# Patient Record
Sex: MALE | Race: BLACK OR AFRICAN AMERICAN | NOT HISPANIC OR LATINO | Employment: OTHER | URBAN - METROPOLITAN AREA
[De-identification: names, ages, dates, MRNs, and addresses within clinical notes are randomized per-mention and may not be internally consistent; named-entity substitution may affect disease eponyms.]

---

## 2017-02-02 ENCOUNTER — OFFICE VISIT (OUTPATIENT)
Dept: PSYCHIATRY | Facility: CLINIC | Age: 57
End: 2017-02-02
Payer: MEDICARE

## 2017-02-02 VITALS
WEIGHT: 207 LBS | DIASTOLIC BLOOD PRESSURE: 89 MMHG | HEART RATE: 87 BPM | BODY MASS INDEX: 30.66 KG/M2 | SYSTOLIC BLOOD PRESSURE: 138 MMHG | HEIGHT: 69 IN

## 2017-02-02 DIAGNOSIS — F32.A DEPRESSIVE DISORDER: Primary | ICD-10-CM

## 2017-02-02 PROCEDURE — 3079F DIAST BP 80-89 MM HG: CPT | Mod: S$GLB,,, | Performed by: PSYCHIATRY & NEUROLOGY

## 2017-02-02 PROCEDURE — 3075F SYST BP GE 130 - 139MM HG: CPT | Mod: S$GLB,,, | Performed by: PSYCHIATRY & NEUROLOGY

## 2017-02-02 PROCEDURE — 99214 OFFICE O/P EST MOD 30 MIN: CPT | Mod: S$GLB,,, | Performed by: PSYCHIATRY & NEUROLOGY

## 2017-02-02 PROCEDURE — 99999 PR PBB SHADOW E&M-EST. PATIENT-LVL II: CPT | Mod: PBBFAC,,, | Performed by: PSYCHIATRY & NEUROLOGY

## 2017-02-02 RX ORDER — SERTRALINE HYDROCHLORIDE 100 MG/1
100 TABLET, FILM COATED ORAL DAILY
Qty: 30 TABLET | Refills: 3 | Status: SHIPPED | OUTPATIENT
Start: 2017-02-02 | End: 2017-03-02

## 2017-02-02 RX ORDER — RISPERIDONE 1 MG/1
1 TABLET ORAL NIGHTLY
Qty: 30 TABLET | Refills: 3 | Status: SHIPPED | OUTPATIENT
Start: 2017-02-02 | End: 2017-02-08 | Stop reason: SDUPTHER

## 2017-02-02 RX ORDER — LAMOTRIGINE 200 MG/1
TABLET ORAL
Qty: 30 TABLET | Refills: 3 | Status: SHIPPED | OUTPATIENT
Start: 2017-02-02 | End: 2017-03-08 | Stop reason: SDUPTHER

## 2017-02-02 NOTE — MR AVS SNAPSHOT
[facility]  [address]  [facility] [address]  Phone: [phone]  Fax: [phone]                  Roni Espinoza   2017 1:00 PM   Office Visit    Description:  Male : 1960   Provider:  Vinnie Cotter MD   Department:  [facility]           Diagnoses this Visit        Comments    Depressive disorder    -  Primary            To Do List           Future Appointments        Provider Department Dept Phone    3/27/2017 8:30 AM INJECTION, INFECTIOUS DISEASES [facility] [phone]      Goals (5 Years of Data)     None       These Medications        Disp Refills Start End    folic acid-vit B6-vit B12 2.5-25-2 mg (FOLBIC) 2.5-25-2 mg Tab 30 tablet 3 2017     Take 1 tablet by mouth once daily. - Oral    Pharmacy: [address] Ph #: [phone]       lamotrigine (LAMICTAL) 200 MG tablet 30 tablet 3 2017     TAKE 1 TABLET ONE TIME DAILY    Pharmacy: [address] Ph #: [phone]       sertraline (ZOLOFT) 100 MG tablet 30 tablet 3 2017 3/4/2017    Take 1 tablet (100 mg total) by mouth once daily. - Oral    Pharmacy: [address] Ph #: [phone]       risperidone (RISPERDAL) 1 MG tablet 30 tablet 3 2017    Take 1 tablet (1 mg total) by mouth every evening. - Oral    Pharmacy: [address] Ph #: [phone]         [facility]     [facility] On Call Nurse Care Line -  Assistance  Registered nurses in the [facility] Center provide clinical advisement, health education, appointment booking, and other advisory services.  Call for this free service at [phone].             Medications           Message regarding Medications     Verify the changes and/or additions to  "your medication regime listed below are the same as discussed with your clinician today.  If any of these changes or additions are incorrect, please notify your healthcare provider.        START taking these NEW medications        Refills    risperidone (RISPERDAL) 1 MG tablet 3    Sig: Take 1 tablet (1 mg total) by mouth every evening.    Class: Normal    Route: Oral      STOP taking these medications     aripiprazole (ABILIFY) 5 MG Tab Take 1 tablet (5 mg total) by mouth every morning.           Verify that the below list of medications is an accurate representation of the medications you are currently taking.  If none reported, the list may be blank. If incorrect, please contact your healthcare provider. Carry this list with you in case of emergency.           Current Medications     ferrous sulfate 325 mg (65 mg iron) Tab tablet Take 1 tablet (325 mg total) by mouth every 12 (twelve) hours.    folic acid-vit B6-vit B12 2.5-25-2 mg (FOLBIC) 2.5-25-2 mg Tab Take 1 tablet by mouth once daily.    hydrochlorothiazide (HYDRODIURIL) 25 MG tablet Take 1 tablet (25 mg total) by mouth once daily.    lamotrigine (LAMICTAL) 200 MG tablet TAKE 1 TABLET ONE TIME DAILY    pantoprazole (PROTONIX) 40 MG tablet Take 1 tablet (40 mg total) by mouth before breakfast.    potassium chloride SA (K-DUR,KLOR-CON) 10 MEQ tablet Take 2 tablets (20 mEq total) by mouth once daily.    risperidone (RISPERDAL) 1 MG tablet Take 1 tablet (1 mg total) by mouth every evening.    sertraline (ZOLOFT) 100 MG tablet     sertraline (ZOLOFT) 100 MG tablet Take 1 tablet (100 mg total) by mouth once daily.    tadalafil (CIALIS) 20 MG Tab Take 1 tablet (20 mg total) by mouth once daily.           Clinical Reference Information           Your Vitals Were     BP Pulse Height Weight BMI    138/89 87 5' 9" (1.753 m) 93.9 kg (207 lb) 30.57 kg/m2      Blood Pressure          Most Recent Value    BP  138/89      Allergies as of 2/2/2017     No Known Allergies    "   Immunizations Administered on Date of Encounter - 2/2/2017     None      Language Assistance Services     ATTENTION: Language assistance services are available, free of charge. Please call [phone].      ATENCIÓN: Si habla gertrude, tiene a laguerre disposición servicios gratuitos de asistencia lingüística. Llame al [phone].     CHÚ Ý: N?u b?n nói Ti?ng Vi?t, có các d?ch v? h? tr? ngôn ng? mi?n phí dành cho b?n. G?i s? [phone].         [facility] complies with applicable Federal civil rights laws and does not discriminate on the basis of race, color, national origin, age, disability, or sex.

## 2017-02-02 NOTE — PROGRESS NOTES
ESTABLISHED OUTPATIENT VISIT   E/M LEVEL 4: [id number]    ENCOUNTER DATE: 2/2/2017  SITE: [facility] [address]    HISTORY    CHIEF COMPLAINT   Roni Espinoza is a 56 y.o. male who presents for follow up of Cognitive d/o N.O.S., Mood d/o N.O.S., Psychosis N.O.S.  .    HPI     Reports hearing the voice of his shadow telling him demeaning things. Pt. Upset during today's visit, tearful, says that he is confused about what is happening with him.    Sexual functioning has not improved significantly since discontinuation of Risperidone.    Psychiatric Review Of Systems - Is patient experiencing or having changes in:  sleep: poor recently  appetite: no  weight: no  energy/anergy: no  interest/pleasure/anhedonia: no  somatic symptoms: no  libido: no  anxiety/panic: no  guilty/hopelessness: no  concentration: no  S.I.B.s/risky behavior: no  Irritability: no  Racing thoughts: no  Impulsive behaviors: no  Paranoia:no  AVH:no    Recent alcohol: no  Recent drug: no    Medical ROS   Denies any current physical complaint     PAST MEDICAL, FAMILY AND SOCIAL HISTORY: The patient's past medical, family and social history have been reviewed and updated as appropriate within the electronic medical record - see encounter notes.    PSYCHOTROPIC MEDICATIONS   Lamictal 200 mg qam, Zoloft 100 mg qam, Abilify 5 mg qam, Folbic at bedtime    EXAMINATION    Vitals:    02/02/17 1157   BP: 138/89   Pulse: 87     Weight: 207 lb's    CONSTITUTIONAL  General Appearance: well nourished    MUSCULOSKELETAL  Muscle Strength and Tone: normal strength and tone  Abnormal Involuntary Movements: no abnormal movement noted  Gait and Station: normal gait    PSYCHIATRIC   Level of Consciousness: alert  Orientation: says, that today is the 4th of November 2016  Grooming: well groomed  Psychomotor Behavior: no restlessness/agitation  Speech: normal in rate, rhythm and volume  Language: normal vocabulary  Mood: depressed at times  Affect:  tearful  Thought Process: logical and goal directed  Associations: intact associations  Thought Content: no SI/HI  Memory: grossly intact  Attention: intact to content of interview  Fund of Knowledge: appears adequate  Insight: good  Judgement: good    MEDICAL DECISION MAKING    DIAGNOSES  Cognitive d/o N.O.S., Mood d/o N.O.S., Psychosis N.O.S.    PROBLEM LIST AND MANAGEMENT PLANS    - mood d/o, psychosis: discontinue Abilify, re-start Risperidone 1 mg at bedtime, continue Zoloft, Lamictal and Folbic as above.  - rtc 2-3 months     Time with patient: 20 min    LABORATORY DATA  Lab Visit on 12/06/2016   Component Date Value Ref Range Status    Hemoglobin 12/06/2016 13.3* 14.0 - 18.0 g/dL Final    Ferritin 12/06/2016 71  20.0 - 300.0 ng/mL Final           Vinnie Cotter

## 2017-02-09 RX ORDER — RISPERIDONE 1 MG/1
TABLET ORAL
Qty: 90 TABLET | Refills: 0 | Status: SHIPPED | OUTPATIENT
Start: 2017-02-09 | End: 2017-03-08 | Stop reason: SDUPTHER

## 2017-02-13 ENCOUNTER — NURSE TRIAGE (OUTPATIENT)
Dept: ADMINISTRATIVE | Facility: CLINIC | Age: 57
End: 2017-02-13

## 2017-02-13 NOTE — TELEPHONE ENCOUNTER
Reason for Disposition   Intermittent chest pains persist > 3 days    Protocols used: ST CHEST PAIN-A-OH    Wife complains of ongoing chest and arm pain off and on for weeks.  No pain now.  Attempt to schedule appointment today, Wife stated she just had a procedure and cannot drive or get anyone to bring him.  She wants an appointment tomorrow.  Appointment scheduled with  tomorrow.  Advised Wife if he starts to have pain again call 911 and bring to ED.

## 2017-03-01 ENCOUNTER — NURSE TRIAGE (OUTPATIENT)
Dept: ADMINISTRATIVE | Facility: CLINIC | Age: 57
End: 2017-03-01

## 2017-03-01 NOTE — TELEPHONE ENCOUNTER
"    Reason for Disposition   Taking a medicine that could cause dizziness (e.g., blood pressure medications, diuretics)    Additional Information   Negative: Chest pain     Chest pain intermittently, arm numbness a couple of weeks ago, but not now.   Commented on: Heart beating < 50 beats per minute OR > 140 beats per minute     Unknown (wife, caller, is not with him at this call)    Answer Assessment - Initial Assessment Questions  1. DESCRIPTION: "Describe your dizziness."      He is losing his balance from dizziness.  He has fallen twice that wife knows of, once last week, and one time a month ago.    2. LIGHTHEADED: "Do you feel lightheaded?" (e.g., somewhat faint, woozy, weak upon standing)      Yes, and when he stands up his back hurts him, too.    3. VERTIGO: "Do you feel like either you or the room is spinning or tilting?" (i.e. vertigo)      I think he says it is spinning.    4. SEVERITY: "How bad is it?"  "Do you feel like you are going to faint?" "Can you stand and walk?"    - MILD - walking normally    - MODERATE - interferes with normal activities (e.g., work, school)     - SEVERE - unable to stand, requires support to walk, feels like passing out now.       He has fallen, and he feels faint often when he is up and walking.    5. ONSET:  "When did the dizziness begin?"      It began originally last year, blood pressure medication was changed (wife states it was decreased, and it was better for a while, but it is now a problem again).    6. AGGRAVATING FACTORS: "Does anything make it worse?" (e.g., standing, change in head position)      When he first stands up it is often worse.  The last time he fell, however, he had gotten out of the shower and was walking, and he just fell to the floor.  He did not lose consciousness at the fall; his leg just gave out.      7. HEART RATE: "Can you tell me your heart rate?" "How many beats in 15 seconds?"  (Note: not all patients can do this)        I don't " "know.    8. CAUSE: "What do you think is causing the dizziness?"      I don't know (wife), because it seems very random.    9. RECURRENT SYMPTOM: "Have you had dizziness before?" If so, ask: "When was the last time?" "What happened that time?"      Yes, last year, and he did lose consciousness at that time.  He saw Shelton Cason then, and that is when the BP medication was changed. (note: they do not have a cuff at home to take pressure).    10. OTHER SYMPTOMS: "Do you have any other symptoms?" (e.g., fever, chest pain, vomiting, diarrhea, bleeding)        He complained two weeks ago of his arm getting numb, and chest pain, but he would not go to the doctor then. He is sweating more now, too.    11. PREGNANCY: "Is there any chance you are pregnant?" "When was your last menstrual period?"        n/a    Protocols used:  DIZZINESS - [id number]    Roni 's wife, Jennifer, called to say he has been having dizzy spells, with some gait changes.  Has fallen twice in the last month, now has back pain as a result, but did not lose consciousness either time (just lost his balance, per Jennifer, as she witnessed those incidents).  Jennifer states it is difficult to get information from him about how he is feeling. She said the last time this happened, over a year ago, it was felt his BP medications were involved in the symptoms, and they were changed.  Jennifer will bring in all medications with her to appt with Dr Handy tomorrow.  Shelton Cason MD is not available at all this week to be seen.  Message to Dr Cason, and to Dr Handy.  Please contact caller directly with any additional care advice.    "

## 2017-03-02 ENCOUNTER — LAB VISIT (OUTPATIENT)
Dept: LAB | Facility: HOSPITAL | Age: 57
End: 2017-03-02
Attending: INTERNAL MEDICINE
Payer: MEDICARE

## 2017-03-02 ENCOUNTER — OFFICE VISIT (OUTPATIENT)
Dept: INTERNAL MEDICINE | Facility: CLINIC | Age: 57
End: 2017-03-02
Payer: MEDICARE

## 2017-03-02 VITALS
HEIGHT: 69 IN | TEMPERATURE: 98 F | BODY MASS INDEX: 30.36 KG/M2 | WEIGHT: 205 LBS | SYSTOLIC BLOOD PRESSURE: 126 MMHG | DIASTOLIC BLOOD PRESSURE: 72 MMHG

## 2017-03-02 DIAGNOSIS — R07.89 ATYPICAL CHEST PAIN: ICD-10-CM

## 2017-03-02 DIAGNOSIS — R40.4 TRANSIENT ALTERATION OF AWARENESS: ICD-10-CM

## 2017-03-02 DIAGNOSIS — E53.8 B12 DEFICIENCY: ICD-10-CM

## 2017-03-02 DIAGNOSIS — I10 ESSENTIAL HYPERTENSION: ICD-10-CM

## 2017-03-02 DIAGNOSIS — G40.309 GENERALIZED CONVULSIVE EPILEPSY WITHOUT INTRACTABLE EPILEPSY: ICD-10-CM

## 2017-03-02 DIAGNOSIS — E55.9 VITAMIN D DEFICIENCY DISEASE: ICD-10-CM

## 2017-03-02 DIAGNOSIS — Q27.30 AVM (ARTERIOVENOUS MALFORMATION): ICD-10-CM

## 2017-03-02 DIAGNOSIS — E61.1 IRON DEFICIENCY: ICD-10-CM

## 2017-03-02 DIAGNOSIS — R55 SYNCOPE, UNSPECIFIED SYNCOPE TYPE: Primary | ICD-10-CM

## 2017-03-02 DIAGNOSIS — F33.3 MAJOR DEPRESSIVE DISORDER, RECURRENT EPISODE, SEVERE, SPECIFIED AS WITH PSYCHOTIC BEHAVIOR: ICD-10-CM

## 2017-03-02 LAB
25(OH)D3+25(OH)D2 SERPL-MCNC: 39 NG/ML
ALBUMIN SERPL BCP-MCNC: 3.9 G/DL
ALP SERPL-CCNC: 85 U/L
ALT SERPL W/O P-5'-P-CCNC: 19 U/L
ANION GAP SERPL CALC-SCNC: 10 MMOL/L
AST SERPL-CCNC: 27 U/L
BASOPHILS # BLD AUTO: 0.02 K/UL
BASOPHILS NFR BLD: 0.3 %
BILIRUB SERPL-MCNC: 0.5 MG/DL
BUN SERPL-MCNC: 10 MG/DL
CALCIUM SERPL-MCNC: 9.4 MG/DL
CHLORIDE SERPL-SCNC: 102 MMOL/L
CO2 SERPL-SCNC: 30 MMOL/L
CREAT SERPL-MCNC: 1.2 MG/DL
DIFFERENTIAL METHOD: ABNORMAL
EOSINOPHIL # BLD AUTO: 0.2 K/UL
EOSINOPHIL NFR BLD: 2.5 %
ERYTHROCYTE [DISTWIDTH] IN BLOOD BY AUTOMATED COUNT: 13.8 %
EST. GFR  (AFRICAN AMERICAN): >60 ML/MIN/1.73 M^2
EST. GFR  (NON AFRICAN AMERICAN): >60 ML/MIN/1.73 M^2
FERRITIN SERPL-MCNC: 69 NG/ML
GLUCOSE SERPL-MCNC: 82 MG/DL
HCT VFR BLD AUTO: 40.6 %
HGB BLD-MCNC: 13.4 G/DL
LYMPHOCYTES # BLD AUTO: 1.4 K/UL
LYMPHOCYTES NFR BLD: 22.2 %
MAGNESIUM SERPL-MCNC: 2.3 MG/DL
MCH RBC QN AUTO: 30.6 PG
MCHC RBC AUTO-ENTMCNC: 33 %
MCV RBC AUTO: 93 FL
MONOCYTES # BLD AUTO: 0.7 K/UL
MONOCYTES NFR BLD: 10 %
NEUTROPHILS # BLD AUTO: 4.2 K/UL
NEUTROPHILS NFR BLD: 64.8 %
PLATELET # BLD AUTO: 184 K/UL
PMV BLD AUTO: 10.7 FL
POTASSIUM SERPL-SCNC: 3.5 MMOL/L
PROT SERPL-MCNC: 7.5 G/DL
RBC # BLD AUTO: 4.38 M/UL
SODIUM SERPL-SCNC: 142 MMOL/L
TSH SERPL DL<=0.005 MIU/L-ACNC: 0.55 UIU/ML
VIT B12 SERPL-MCNC: >2000 PG/ML
WBC # BLD AUTO: 6.49 K/UL

## 2017-03-02 PROCEDURE — 1160F RVW MEDS BY RX/DR IN RCRD: CPT | Mod: S$GLB,,, | Performed by: INTERNAL MEDICINE

## 2017-03-02 PROCEDURE — 3074F SYST BP LT 130 MM HG: CPT | Mod: S$GLB,,, | Performed by: INTERNAL MEDICINE

## 2017-03-02 PROCEDURE — 82607 VITAMIN B-12: CPT

## 2017-03-02 PROCEDURE — 85025 COMPLETE CBC W/AUTO DIFF WBC: CPT

## 2017-03-02 PROCEDURE — 93005 ELECTROCARDIOGRAM TRACING: CPT | Mod: S$GLB,,, | Performed by: INTERNAL MEDICINE

## 2017-03-02 PROCEDURE — 99215 OFFICE O/P EST HI 40 MIN: CPT | Mod: S$GLB,,, | Performed by: INTERNAL MEDICINE

## 2017-03-02 PROCEDURE — 80053 COMPREHEN METABOLIC PANEL: CPT

## 2017-03-02 PROCEDURE — 82728 ASSAY OF FERRITIN: CPT

## 2017-03-02 PROCEDURE — 84443 ASSAY THYROID STIM HORMONE: CPT

## 2017-03-02 PROCEDURE — 93010 ELECTROCARDIOGRAM REPORT: CPT | Mod: S$GLB,,, | Performed by: INTERNAL MEDICINE

## 2017-03-02 PROCEDURE — 80175 DRUG SCREEN QUAN LAMOTRIGINE: CPT

## 2017-03-02 PROCEDURE — 83735 ASSAY OF MAGNESIUM: CPT

## 2017-03-02 PROCEDURE — 3078F DIAST BP <80 MM HG: CPT | Mod: S$GLB,,, | Performed by: INTERNAL MEDICINE

## 2017-03-02 PROCEDURE — 36415 COLL VENOUS BLD VENIPUNCTURE: CPT

## 2017-03-02 PROCEDURE — 99999 PR PBB SHADOW E&M-EST. PATIENT-LVL III: CPT | Mod: PBBFAC,,, | Performed by: INTERNAL MEDICINE

## 2017-03-02 PROCEDURE — 82306 VITAMIN D 25 HYDROXY: CPT

## 2017-03-02 RX ORDER — HYDROCHLOROTHIAZIDE 25 MG/1
12.5 TABLET ORAL DAILY
Qty: 90 TABLET | Refills: 3
Start: 2017-03-02 | End: 2017-11-07 | Stop reason: SDUPTHER

## 2017-03-02 NOTE — Clinical Note
Hi all: I saw him with his wife.  She is very worried about his memory and mental status along with some physical sx.  EKG OK- ordered labs, carotid and stress test. Shelton- he needs to see you for follow up. Dr Cotter- do you think he may need Neurology vs Neuropsych testing?

## 2017-03-02 NOTE — MR AVS SNAPSHOT
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    -  Primary     Major depressive disorder, recurrent episode, severe, specified as with psychotic behavior         Essential hypertension         AVM (arteriovenous malformation)         Transient alteration of awareness         Iron deficiency         B12 deficiency         Vitamin D deficiency disease         Syncope, unspecified syncope type         Atypical chest pain                To Do List           Future Appointments        Provider Department Dept Phone    3/27/2017 8:30 AM INJECTION, INFECTIOUS DISEASES [facility] [phone]      Goals (5 Years of Data)     None       These Medications        Disp Refills Start End    hydrochlorothiazide (HYDRODIURIL) 25 MG tablet 90 tablet 3 3/2/2017 3/2/2018    Take 0.5 tablets (12.5 mg total) by mouth once daily. - Oral    Pharmacy: [facility] - [address] #: [phone]         [facility] On Call     [facility] On Call Nurse Care Line -  Assistance  Registered nurses in the [facility] On Call Center provide clinical advisement, health education, appointment booking, and other advisory services.  Call for this free service at [phone].             Medications           Message regarding Medications     Verify the changes and/or additions to your medication regime listed below are the same as discussed with your clinician today.  If any of these changes or additions are incorrect, please notify your healthcare provider.        CHANGE how you  are taking these medications     Start Taking Instead of    hydrochlorothiazide (HYDRODIURIL) 25 MG tablet hydrochlorothiazide (HYDRODIURIL) 25 MG tablet    Dosage:  Take 0.5 tablets (12.5 mg total) by mouth once daily. Dosage:  Take 1 tablet (25 mg total) by mouth once daily.    Reason for Change:  Reorder            Verify that the below list of medications is an accurate representation of the medications you are currently taking.  If none reported, the list may be blank. If incorrect, please contact your healthcare provider. Carry this list with you in case of emergency.           Current Medications     ferrous sulfate 325 mg (65 mg iron) Tab tablet Take 1 tablet (325 mg total) by mouth every 12 (twelve) hours.    folic acid-vit B6-vit B12 2.5-25-2 mg (FOLBIC) 2.5-25-2 mg Tab Take 1 tablet by mouth once daily.    hydrochlorothiazide (HYDRODIURIL) 25 MG tablet Take 0.5 tablets (12.5 mg total) by mouth once daily.    pantoprazole (PROTONIX) 40 MG tablet Take 1 tablet (40 mg total) by mouth before breakfast.    risperidone (RISPERDAL) 1 MG tablet TAKE 1 TABLET EVERY EVENING    sertraline (ZOLOFT) 100 MG tablet     tadalafil (CIALIS) 20 MG Tab Take 1 tablet (20 mg total) by mouth once daily.    lamotrigine (LAMICTAL) 200 MG tablet TAKE 1 TABLET ONE TIME DAILY    potassium chloride SA (K-DUR,KLOR-CON) 10 MEQ tablet Take 2 tablets (20 mEq total) by mouth once daily.           Clinical Reference Information           Your Vitals Were     BP                   126/72 (BP Location: Right arm, Patient Position: Sitting, BP Method: Manual)           Blood Pressure          Most Recent Value    BP  126/72      Allergies as of 3/2/2017     No Known Allergies      Immunizations Administered on Date of Encounter - 3/2/2017     None      Orders Placed During Today's Visit      Normal Orders This Visit    EKG 12-lead     Future Labs/Procedures Expected by Expires    CBC auto differential  3/2/2017 3/2/2018    Comprehensive  metabolic panel  3/2/2017 3/2/2018    Ferritin  3/2/2017 5/31/2017    LAMOTRIGINE LEVEL  3/2/2017 5/1/2018    Magnesium  3/2/2017 3/2/2018    TSH  3/2/2017 3/2/2018    Vitamin B12  3/2/2017 3/2/2018    Vitamin D  3/2/2017 (Approximate) 3/2/2018    CAR Ultrasound doppler carotid bliateral  As directed 3/2/2018    Exercise stress echo  As directed 3/2/2018      Language Assistance Services     ATTENTION: Language assistance services are available, free of charge. Please call [phone].      ATENCIÓN: Si habla español, tiene a laguerre disposición servicios gratuitos de asistencia lingüística. Llame al [phone].     CHÚ Ý: N?u b?n nói Ti?ng Vi?t, có các d?ch v? h? tr? ngôn ng? mi?n phí dành cho b?n. G?i s? [phone].         [facility] complies with applicable Federal civil rights laws and does not discriminate on the basis of race, color, national origin, age, disability, or sex.

## 2017-03-02 NOTE — PROGRESS NOTES
"Subjective:       Patient ID: Roni Espinoza is a 56 y.o. male.    Chief Complaint: Dizziness    HPI Comments:  appt Dr Cason    Here with wife.    Multiple issues.  Most of history from wife.  He has some psychiatric diagnoses.  Wife is concerned about some memory issues as well, no formal dementia dx.   Last Psych note says "mood disorder and psychosis."  No new meds.  No ETOH or drugs.    First time it happened (over 6 months ago) he had been sitting at the kitchen table, then stood up, and felt dizzy and fell.  He was seen after this and a nurse practitioner saw him and adjusted his BP meds.    In the past 3-4 weeks, his wife says he has had multiple episodes.  He becomes sweaty.  Sometimes this occurs when he is sitting, and sometimes it occurs when he is active.    In particular yesterday he was sitting when sx started.  Denies emotional sx.      He also says sometimes he gets chest pain, usually starts in the right arm.  This makes him feel nervous. Sx are usually at night.  It wakes him up from his sleep.    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    Review of Systems   Constitutional: Positive for fatigue.   HENT: Negative for congestion, postnasal drip, rhinorrhea and sinus pressure.    Eyes: Negative for redness and visual disturbance.   Respiratory: Negative for apnea, choking, shortness of breath and stridor.    Cardiovascular: Positive for chest pain. Negative for palpitations and leg swelling.        Atypical chest pain,  Not exertional.  Has wakened him from sleep    Not sure about GERD   Gastrointestinal: Negative for abdominal " pain, constipation, diarrhea and nausea.   Genitourinary: Negative for difficulty urinating, flank pain, frequency, testicular pain and urgency.   Musculoskeletal: Negative for arthralgias, back pain and myalgias.   Skin: Negative for color change and rash.   Neurological: Positive for dizziness.        Pre-syncope  Memory issues- vague per wife   Psychiatric/Behavioral: Positive for confusion and dysphoric mood. Negative for hallucinations, self-injury and sleep disturbance. The patient is not nervous/anxious.        Objective:      Physical Exam   Constitutional: He is oriented to person, place, and time. He appears well-developed and well-nourished.   HENT:   Head: Normocephalic and atraumatic.   Right Ear: External ear normal.   Left Ear: External ear normal.   Eyes: Conjunctivae and EOM are normal.   Neck: Normal range of motion. Neck supple. No thyromegaly present.   Cardiovascular: Normal rate and regular rhythm.    No murmur heard.  Pulmonary/Chest: Effort normal and breath sounds normal. No respiratory distress. He has no wheezes.   Abdominal: Soft. He exhibits no distension. There is no tenderness.   Musculoskeletal: He exhibits no edema or tenderness.   Lymphadenopathy:     He has no cervical adenopathy.   Neurological: He is alert and oriented to person, place, and time. No cranial nerve deficit.   Skin: Skin is warm and dry.   Psychiatric: He has a normal mood and affect. His behavior is normal.       Assessment:       1. Syncope, unspecified syncope type    2. Atypical chest pain    3. 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 Major depressive disorder, recurrent episode, severe, specified as with psychotic behavior    5. Essential hypertension    6. AVM (arteriovenous malformation)    7. Transient alteration of awareness    8. Iron deficiency    9. B12 deficiency    10. Vitamin D deficiency disease        Plan:         Syncope, unspecified syncope type: cautions reviewed,  postural measures.    Reduce diuretic to 1/2 daily.    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  Call if BP > 135/85 on a regular basis.  -     CBC auto differential; Future; Expected date: 3/2/17  -     Comprehensive metabolic panel; Future; Expected date: 3/2/17  -     hydrochlorothiazide (HYDRODIURIL) 25 MG tablet; Take 0.5 tablets (12.5 mg total) by mouth once daily.  Dispense: 90 tablet; Refill: 3    AVM (arteriovenous malformation): stable per Neurosurgery    Transient alteration of awareness: may need to consider Neurology assessment and possible Neuropsych testing?  -     Magnesium; Future; Expected date: 3/2/17  -     TSH; Future; Expected date: 3/2/17    Iron deficiency  -     Ferritin; Future; Expected date: 3/2/17    B12 deficiency  -     Vitamin B12; Future; Expected date: 3/2/17    Vitamin D deficiency disease  -     Vitamin D; Future; Expected date: 3/2/17    Patient evaluated for over 45 minutes with this appoinment, including diagnostic testing and treatment.  All questions answered,  chart reviewed and care coordinated.

## 2017-03-03 ENCOUNTER — CLINICAL SUPPORT (OUTPATIENT)
Dept: CARDIOLOGY | Facility: CLINIC | Age: 57
End: 2017-03-03
Payer: MEDICARE

## 2017-03-03 ENCOUNTER — HOSPITAL ENCOUNTER (OUTPATIENT)
Dept: CARDIOLOGY | Facility: CLINIC | Age: 57
Discharge: HOME | End: 2017-03-03
Payer: MEDICARE

## 2017-03-03 DIAGNOSIS — R07.89 ATYPICAL CHEST PAIN: ICD-10-CM

## 2017-03-03 DIAGNOSIS — R55 SYNCOPE, UNSPECIFIED SYNCOPE TYPE: ICD-10-CM

## 2017-03-03 LAB
DIASTOLIC DYSFUNCTION: NO
INTERNAL CAROTID STENOSIS: NORMAL
RETIRED EF AND QEF - SEE NOTES: 60 (ref 55–65)

## 2017-03-03 PROCEDURE — 93351 STRESS TTE COMPLETE: CPT | Mod: S$GLB,,, | Performed by: INTERNAL MEDICINE

## 2017-03-03 PROCEDURE — 93321 DOPPLER ECHO F-UP/LMTD STD: CPT | Mod: S$GLB,,, | Performed by: INTERNAL MEDICINE

## 2017-03-03 PROCEDURE — 93880 EXTRACRANIAL BILAT STUDY: CPT | Mod: S$GLB,,, | Performed by: INTERNAL MEDICINE

## 2017-03-04 ENCOUNTER — PATIENT MESSAGE (OUTPATIENT)
Dept: INTERNAL MEDICINE | Facility: CLINIC | Age: 57
End: 2017-03-04

## 2017-03-06 ENCOUNTER — TELEPHONE (OUTPATIENT)
Dept: INTERNAL MEDICINE | Facility: CLINIC | Age: 57
End: 2017-03-06

## 2017-03-06 ENCOUNTER — PATIENT MESSAGE (OUTPATIENT)
Dept: INTERNAL MEDICINE | Facility: CLINIC | Age: 57
End: 2017-03-06

## 2017-03-06 DIAGNOSIS — R41.82 ALTERED MENTAL STATUS, UNSPECIFIED ALTERED MENTAL STATUS TYPE: Primary | ICD-10-CM

## 2017-03-06 LAB — LAMOTRIGINE SERPL-MCNC: 2.3 UG/ML (ref 2–15)

## 2017-03-06 NOTE — TELEPHONE ENCOUNTER
----- Message from Vinnie Cotter MD sent at 3/6/2017  8:03 AM CST -----  Thank you so much! Yes, neurology f/u would be good. He has not seen a neurologist in our system since 07/02/14. I will see him on Wednesday.  Vinnie Cotter  ----- Message -----     From: Ginger Handy MD     Sent: 3/4/2017  10:14 AM       To: Vinnie Cotter MD    Hi all:  I saw him with his wife.  She is very worried about his memory and mental status along with some physical sx.  EKG OK- ordered labs, carotid and stress test.  Shelton- he needs to see you for follow up.  Dr Cotter- do you think he may need Neurology vs Neuropsych testing?

## 2017-03-06 NOTE — TELEPHONE ENCOUNTER
Please let him or his wife know that I heard from Dr. Cotter and he is recommending a Neurology assessment.    I have placed a referral for this thanks

## 2017-03-06 NOTE — TELEPHONE ENCOUNTER
Pt seen by Dr Handy, wife is concerned about his memory. Pt is overdue for appointment. Please schedule for a follow up visit sometime in next 3-4 weeks, okay to overbook.

## 2017-03-08 ENCOUNTER — OFFICE VISIT (OUTPATIENT)
Dept: PSYCHIATRY | Facility: CLINIC | Age: 57
End: 2017-03-08
Payer: MEDICARE

## 2017-03-08 VITALS
HEART RATE: 69 BPM | WEIGHT: 208 LBS | DIASTOLIC BLOOD PRESSURE: 81 MMHG | HEIGHT: 69 IN | SYSTOLIC BLOOD PRESSURE: 124 MMHG | BODY MASS INDEX: 30.81 KG/M2

## 2017-03-08 DIAGNOSIS — F39 MOOD DISORDER: Primary | ICD-10-CM

## 2017-03-08 PROCEDURE — 3074F SYST BP LT 130 MM HG: CPT | Mod: S$GLB,,, | Performed by: PSYCHIATRY & NEUROLOGY

## 2017-03-08 PROCEDURE — 3079F DIAST BP 80-89 MM HG: CPT | Mod: S$GLB,,, | Performed by: PSYCHIATRY & NEUROLOGY

## 2017-03-08 PROCEDURE — 99999 PR PBB SHADOW E&M-EST. PATIENT-LVL II: CPT | Mod: PBBFAC,,, | Performed by: PSYCHIATRY & NEUROLOGY

## 2017-03-08 PROCEDURE — 1160F RVW MEDS BY RX/DR IN RCRD: CPT | Mod: S$GLB,,, | Performed by: PSYCHIATRY & NEUROLOGY

## 2017-03-08 PROCEDURE — 99214 OFFICE O/P EST MOD 30 MIN: CPT | Mod: S$GLB,,, | Performed by: PSYCHIATRY & NEUROLOGY

## 2017-03-08 RX ORDER — RISPERIDONE 1 MG/1
1 TABLET ORAL NIGHTLY
Qty: 90 TABLET | Refills: 0 | Status: SHIPPED | OUTPATIENT
Start: 2017-03-08 | End: 2017-05-03 | Stop reason: SDUPTHER

## 2017-03-08 RX ORDER — LAMOTRIGINE 200 MG/1
TABLET ORAL
Qty: 30 TABLET | Refills: 3 | Status: SHIPPED | OUTPATIENT
Start: 2017-03-08 | End: 2017-05-03 | Stop reason: SDUPTHER

## 2017-03-08 RX ORDER — SERTRALINE HYDROCHLORIDE 100 MG/1
100 TABLET, FILM COATED ORAL DAILY
Qty: 30 TABLET | Refills: 3 | Status: SHIPPED | OUTPATIENT
Start: 2017-03-08 | End: 2017-04-07

## 2017-03-08 NOTE — MR AVS SNAPSHOT
[facility] - Psychiatry  [address] [facility] [address]  Phone: [phone]  Fax: [phone]                  Roni Espinoza   3/8/2017 3:30 PM   Office Visit    Description:  Male : 1960   Provider:  Vinnie Cotter MD   Department:  [facility] - Psychiatry           Diagnoses this Visit        Comments    Mood disorder    -  Primary            To Do List           Future Appointments        Provider Department Dept Phone    3/21/2017 11:20 AM Shelton Cason MD [facility] - Internal Medicine [phone]    3/27/2017 8:30 AM INJECTION, INFECTIOUS DISEASES [facility]- ID Injection Room [phone]      Goals (5 Years of Data)     None       These Medications        Disp Refills Start End    lamotrigine (LAMICTAL) 200 MG tablet 30 tablet 3 3/8/2017     TAKE 1 TABLET ONE TIME DAILY    Pharmacy: [address] Ph #: [phone]       risperidone (RISPERDAL) 1 MG tablet 90 tablet 0 3/8/2017     Take 1 tablet (1 mg total) by mouth every evening. - Oral    Pharmacy: [address] - [address] Ph #: [phone]       sertraline (ZOLOFT) 100 MG tablet 30 tablet 3 3/8/2017 2017    Take 1 tablet (100 mg total) by mouth once daily. - Oral    Pharmacy: [address] Ph #: [phone]       folic acid-vit B6-vit B12 2.5-25-2 mg (FOLBIC) 2.5-25-2 mg Tab 30 tablet 3 3/8/2017     Take 1 tablet by mouth once daily. - Oral    Pharmacy: [address] Ph #: [phone]         [facility]     [facility] On Call Nurse Care Line -  Assistance  Registered nurses in the [facility] provide clinical advisement, health education, appointment booking, and other advisory services.  Call for this free service at [phone].             Medications           Message  regarding Medications     Verify the changes and/or additions to your medication regime listed below are the same as discussed with your clinician today.  If any of these changes or additions are incorrect, please notify your healthcare provider.        START taking these NEW medications        Refills    sertraline (ZOLOFT) 100 MG tablet 3    Sig: Take 1 tablet (100 mg total) by mouth once daily.    Class: Normal    Route: Oral      CHANGE how you are taking these medications     Start Taking Instead of    risperidone (RISPERDAL) 1 MG tablet risperidone (RISPERDAL) 1 MG tablet    Dosage:  Take 1 tablet (1 mg total) by mouth every evening. Dosage:  TAKE 1 TABLET EVERY EVENING    Reason for Change:  Reorder            Verify that the below list of medications is an accurate representation of the medications you are currently taking.  If none reported, the list may be blank. If incorrect, please contact your healthcare provider. Carry this list with you in case of emergency.           Current Medications     ferrous sulfate 325 mg (65 mg iron) Tab tablet Take 1 tablet (325 mg total) by mouth every 12 (twelve) hours.    folic acid-vit B6-vit B12 2.5-25-2 mg (FOLBIC) 2.5-25-2 mg Tab Take 1 tablet by mouth once daily.    hydrochlorothiazide (HYDRODIURIL) 25 MG tablet Take 0.5 tablets (12.5 mg total) by mouth once daily.    lamotrigine (LAMICTAL) 200 MG tablet TAKE 1 TABLET ONE TIME DAILY    pantoprazole (PROTONIX) 40 MG tablet Take 1 tablet (40 mg total) by mouth before breakfast.    potassium chloride SA (K-DUR,KLOR-CON) 10 MEQ tablet Take 2 tablets (20 mEq total) by mouth once daily.    risperidone (RISPERDAL) 1 MG tablet Take 1 tablet (1 mg total) by mouth every evening.    sertraline (ZOLOFT) 100 MG tablet     sertraline (ZOLOFT) 100 MG tablet Take 1 tablet (100 mg total) by mouth once daily.    tadalafil (CIALIS) 20 MG Tab Take 1 tablet (20 mg total) by mouth once daily.           Clinical Reference Information     "       Your Vitals Were     BP Pulse Height Weight BMI    124/81 69 5' 9" (1.753 m) 94.3 kg (208 lb) 30.72 kg/m2      Blood Pressure          Most Recent Value    BP  124/81      Allergies as of 3/8/2017     No Known Allergies      Immunizations Administered on Date of Encounter - 3/8/2017     None      Language Assistance Services     ATTENTION: Language assistance services are available, free of charge. Please call [phone].      ATENCIÓN: Si habla español, tiene a laguerre disposición servicios gratuitos de asistencia lingüística. Llame al [phone].     PRIYANKA Ý: N?u b?n nói Ti?ng Vi?t, có các d?ch v? h? tr? ngôn ng? mi?n phí dành cho b?n. G?i s? [phone].         [facility] complies with applicable Federal civil rights laws and does not discriminate on the basis of race, color, national origin, age, disability, or sex.        "

## 2017-03-08 NOTE — PROGRESS NOTES
ESTABLISHED OUTPATIENT VISIT   E/M LEVEL 4: [id number]    ENCOUNTER DATE: 3/8/2017  SITE: [facility]    HISTORY    CHIEF COMPLAINT   Roni Espinoza is a 56 y.o. male who presents for follow up of Cognitive d/o N.O.S., Mood d/o N.O.S., Psychosis N.O.S.  .    HPI     Reports feeling much better that at previous visit with me.    Reports AH's at times, but not troublesome recently.    Wife arranges meds for pt.    Watches TV.    Grew up in [place].    Psychiatric Review Of Systems - Is patient experiencing or having changes in:  sleep: at times takes naps during the day  appetite: no  weight: no  energy/anergy: no  interest/pleasure/anhedonia: no  somatic symptoms: no  libido: no  anxiety/panic: no  guilty/hopelessness: no  concentration: no  S.I.B.s/risky behavior: no  Irritability: no  Racing thoughts: no  Impulsive behaviors: no  Paranoia:no  AVH:no    Recent alcohol: no  Recent drug: no    Medical ROS   Denies any current physical complaint     PAST MEDICAL, FAMILY AND SOCIAL HISTORY: The patient's past medical, family and social history have been reviewed and updated as appropriate within the electronic medical record - see encounter notes.    PSYCHOTROPIC MEDICATIONS   Lamictal 200 mg qam, Zoloft 100 mg qam, Risperdal 1 mg at bedtime, Folbic at bedtime    EXAMINATION    Vitals:    03/08/17 1520   BP: 124/81   Pulse: 69     Weight: 208 lb's    CONSTITUTIONAL  General Appearance: well nourished    MUSCULOSKELETAL  Muscle Strength and Tone: normal strength and tone  Abnormal Involuntary Movements: no abnormal movement noted  Gait and Station: normal gait    PSYCHIATRIC   Level of Consciousness: alert  Orientation: states, that today is 3/9/17, knows that today is Wednesday  Grooming: well groomed  Psychomotor Behavior: no restlessness/agitation  Speech: normal in rate, rhythm and volume  Language: normal vocabulary  Mood: steady  Affect: appropriate  Thought Process: logical and goal  directed  Associations: intact associations  Thought Content: no SI/HI  Memory: grossly intact  Attention: intact to content of interview  Fund of Knowledge: appears adequate  Insight: good  Judgement: good    MEDICAL DECISION MAKING    DIAGNOSES  Cognitive d/o N.O.S., Mood d/o N.O.S., Psychosis N.O.S.    PROBLEM LIST AND MANAGEMENT PLANS    - mood d/o, psychosis: continue above meds  - rtc 2 months     Time with patient: 20 min    LABORATORY DATA  Hospital Outpatient Visit on 03/03/2017   Component Date Value Ref Range Status    EF 03/03/2017 60  55 - 65 Final    Diastolic Dysfunction 03/03/2017 No   Final   Clinical Support on 03/03/2017   Component Date Value Ref Range Status    Internal Carotid Stenosis 03/03/2017 0-19%   Final   Lab Visit on 03/02/2017   Component Date Value Ref Range Status    Lamotrigine Lvl 03/02/2017 2.3  2.0 - 15.0 ug/mL Final    Comment: Lamotrigine toxic level:  >20 ug/mL  The reference range is not well established.  It may be as wide as 1 - 20 ug/mL.  If applicable, any drug confirmation testing reported  here was developed and the performance characteristics  determined by [facility]. This   confirmation testing has not been cleared or approved  by the FDA. The laboratory is regulated under CLIA as  qualified to perform high-complexity testing. This test  is used for patient testing purposes. It should not be  regarded as investigational or for research.  Test performed at [facility],  [address] , [place]  [address]     [phone]  Cam Macias MD  - Medical Director      WBC 03/02/2017 6.49  3.90 - 12.70 K/uL Final    RBC 03/02/2017 4.38* 4.60 - 6.20 M/uL Final    Hemoglobin 03/02/2017 13.4* 14.0 - 18.0 g/dL Final    Hematocrit 03/02/2017 40.6  40.0 - 54.0 % Final    MCV 03/02/2017 93  82 - 98 fL Final    MCH 03/02/2017 30.6  27.0 - 31.0 pg Final    MCHC 03/02/2017 33.0  32.0 - 36.0 % Final    RDW 03/02/2017 13.8  11.5 - 14.5 %  Final    Platelets 03/02/2017 184  150 - 350 K/uL Final    MPV 03/02/2017 10.7  9.2 - 12.9 fL Final    Gran # 03/02/2017 4.2  1.8 - 7.7 K/uL Final    Lymph # 03/02/2017 1.4  1.0 - 4.8 K/uL Final    Mono # 03/02/2017 0.7  0.3 - 1.0 K/uL Final    Eos # 03/02/2017 0.2  0.0 - 0.5 K/uL Final    Baso # 03/02/2017 0.02  0.00 - 0.20 K/uL Final    Gran% 03/02/2017 64.8  38.0 - 73.0 % Final    Lymph% 03/02/2017 22.2  18.0 - 48.0 % Final    Mono% 03/02/2017 10.0  4.0 - 15.0 % Final    Eosinophil% 03/02/2017 2.5  0.0 - 8.0 % Final    Basophil% 03/02/2017 0.3  0.0 - 1.9 % Final    Differential Method 03/02/2017 Automated   Final    Sodium 03/02/2017 142  136 - 145 mmol/L Final    Potassium 03/02/2017 3.5  3.5 - 5.1 mmol/L Final    Chloride 03/02/2017 102  95 - 110 mmol/L Final    CO2 03/02/2017 30* 23 - 29 mmol/L Final    Glucose 03/02/2017 82  70 - 110 mg/dL Final    BUN, Bld 03/02/2017 10  6 - 20 mg/dL Final    Creatinine 03/02/2017 1.2  0.5 - 1.4 mg/dL Final    Calcium 03/02/2017 9.4  8.7 - 10.5 mg/dL Final    Total Protein 03/02/2017 7.5  6.0 - 8.4 g/dL Final    Albumin 03/02/2017 3.9  3.5 - 5.2 g/dL Final    Total Bilirubin 03/02/2017 0.5  0.1 - 1.0 mg/dL Final    Comment: For infants and newborns, interpretation of results should be based  on gestational age, weight and in agreement with clinical  observations.  Premature Infant recommended reference ranges:  Up to 24 hours.............<8.0 mg/dL  Up to 48 hours............<12.0 mg/dL  3-5 days..................<15.0 mg/dL  6-29 days.................<15.0 mg/dL      Alkaline Phosphatase 03/02/2017 85  55 - 135 U/L Final    AST 03/02/2017 27  10 - 40 U/L Final    ALT 03/02/2017 19  10 - 44 U/L Final    Anion Gap 03/02/2017 10  8 - 16 mmol/L Final    eGFR if African American 03/02/2017 >60.0  >60 mL/min/1.73 m^2 Final    eGFR if non African American 03/02/2017 >60.0  >60 mL/min/1.73 m^2 Final    Comment: Calculation used to obtain the estimated  glomerular filtration  rate (eGFR) is the CKD-EPI equation. Since race is unknown   in our information system, the eGFR values for   -American and Non--American patients are given   for each creatinine result.      Ferritin 03/02/2017 69  20.0 - 300.0 ng/mL Final    Magnesium 03/02/2017 2.3  1.6 - 2.6 mg/dL Final    Vit D, 25-Hydroxy 03/02/2017 39  30 - 96 ng/mL Final    Comment: Vitamin D deficiency.........<10 ng/mL                              Vitamin D insufficiency......10-29 ng/mL       Vitamin D sufficiency........> or equal to 30 ng/mL  Vitamin D toxicity............>100 ng/mL      Vitamin B-12 03/02/2017 >2000* 210 - 950 pg/mL Final    TSH 03/02/2017 0.549  0.400 - 4.000 uIU/mL Final           Vinnie Cotter        ESTABLISHED OUTPATIENT VISIT   E/M LEVEL 4: [id number]    ENCOUNTER DATE: 3/8/2017  SITE: [facility] [address]    HISTORY    CHIEF COMPLAINT   Roni Espinoza is a 56 y.o. male who presents for follow up of Cognitive d/o N.O.S., Mood d/o N.O.S., Psychosis N.O.S.  .    HPI     Reports hearing the voice of his shadow telling him demeaning things. Pt. Upset during today's visit, tearful, says that he is confused about what is happening with him.    Sexual functioning has not improved significantly since discontinuation of Risperidone.    Psychiatric Review Of Systems - Is patient experiencing or having changes in:  sleep: poor recently  appetite: no  weight: no  energy/anergy: no  interest/pleasure/anhedonia: no  somatic symptoms: no  libido: no  anxiety/panic: no  guilty/hopelessness: no  concentration: no  S.I.B.s/risky behavior: no  Irritability: no  Racing thoughts: no  Impulsive behaviors: no  Paranoia:no  AVH:no    Recent alcohol: no  Recent drug: no    Medical ROS   Denies any current physical complaint     PAST MEDICAL, FAMILY AND SOCIAL HISTORY: The patient's past medical, family and social history have been reviewed and updated as appropriate within the  electronic medical record - see encounter notes.    PSYCHOTROPIC MEDICATIONS   Lamictal 200 mg qam, Zoloft 100 mg qam, Abilify 5 mg qam, Folbic at bedtime    EXAMINATION    Vitals:    03/08/17 1520   BP: 124/81   Pulse: 69     Weight: 207 lb's    CONSTITUTIONAL  General Appearance: well nourished    MUSCULOSKELETAL  Muscle Strength and Tone: normal strength and tone  Abnormal Involuntary Movements: no abnormal movement noted  Gait and Station: normal gait    PSYCHIATRIC   Level of Consciousness: alert  Orientation: says, that today is the 4th of November 2016  Grooming: well groomed  Psychomotor Behavior: no restlessness/agitation  Speech: normal in rate, rhythm and volume  Language: normal vocabulary  Mood: depressed at times  Affect: tearful  Thought Process: logical and goal directed  Associations: intact associations  Thought Content: no SI/HI  Memory: grossly intact  Attention: intact to content of interview  Fund of Knowledge: appears adequate  Insight: good  Judgement: good    MEDICAL DECISION MAKING    DIAGNOSES  Cognitive d/o N.O.S., Mood d/o N.O.S., Psychosis N.O.S.    PROBLEM LIST AND MANAGEMENT PLANS    - mood d/o, psychosis: discontinue Abilify, re-start Risperidone 1 mg at bedtime, continue Zoloft, Lamictal and Folbic as above.  - rtc 2-3 months     Time with patient: 20 min    LABORATORY DATA  Hospital Outpatient Visit on 03/03/2017   Component Date Value Ref Range Status    EF 03/03/2017 60  55 - 65 Final    Diastolic Dysfunction 03/03/2017 No   Final   Clinical Support on 03/03/2017   Component Date Value Ref Range Status    Internal Carotid Stenosis 03/03/2017 0-19%   Final   Lab Visit on 03/02/2017   Component Date Value Ref Range Status    Lamotrigine Lvl 03/02/2017 2.3  2.0 - 15.0 ug/mL Final    Comment: Lamotrigine toxic level:  >20 ug/mL  The reference range is not well established.  It may be as wide as 1 - 20 ug/mL.  If applicable, any drug confirmation testing reported  here was  developed and the performance characteristics  determined by [facility]. This   confirmation testing has not been cleared or approved  by the FDA. The laboratory is regulated under CLIA as  qualified to perform high-complexity testing. This test  is used for patient testing purposes. It should not be  regarded as investigational or for research.  Test performed at [facility],  [address] , [place]  [address]     [phone]  Cam Macias MD  - Medical Director      WBC 03/02/2017 6.49  3.90 - 12.70 K/uL Final    RBC 03/02/2017 4.38* 4.60 - 6.20 M/uL Final    Hemoglobin 03/02/2017 13.4* 14.0 - 18.0 g/dL Final    Hematocrit 03/02/2017 40.6  40.0 - 54.0 % Final    MCV 03/02/2017 93  82 - 98 fL Final    MCH 03/02/2017 30.6  27.0 - 31.0 pg Final    MCHC 03/02/2017 33.0  32.0 - 36.0 % Final    RDW 03/02/2017 13.8  11.5 - 14.5 % Final    Platelets 03/02/2017 184  150 - 350 K/uL Final    MPV 03/02/2017 10.7  9.2 - 12.9 fL Final    Gran # 03/02/2017 4.2  1.8 - 7.7 K/uL Final    Lymph # 03/02/2017 1.4  1.0 - 4.8 K/uL Final    Mono # 03/02/2017 0.7  0.3 - 1.0 K/uL Final    Eos # 03/02/2017 0.2  0.0 - 0.5 K/uL Final    Baso # 03/02/2017 0.02  0.00 - 0.20 K/uL Final    Gran% 03/02/2017 64.8  38.0 - 73.0 % Final    Lymph% 03/02/2017 22.2  18.0 - 48.0 % Final    Mono% 03/02/2017 10.0  4.0 - 15.0 % Final    Eosinophil% 03/02/2017 2.5  0.0 - 8.0 % Final    Basophil% 03/02/2017 0.3  0.0 - 1.9 % Final    Differential Method 03/02/2017 Automated   Final    Sodium 03/02/2017 142  136 - 145 mmol/L Final    Potassium 03/02/2017 3.5  3.5 - 5.1 mmol/L Final    Chloride 03/02/2017 102  95 - 110 mmol/L Final    CO2 03/02/2017 30* 23 - 29 mmol/L Final    Glucose 03/02/2017 82  70 - 110 mg/dL Final    BUN, Bld 03/02/2017 10  6 - 20 mg/dL Final    Creatinine 03/02/2017 1.2  0.5 - 1.4 mg/dL Final    Calcium 03/02/2017 9.4  8.7 - 10.5 mg/dL Final    Total Protein 03/02/2017 7.5   6.0 - 8.4 g/dL Final    Albumin 03/02/2017 3.9  3.5 - 5.2 g/dL Final    Total Bilirubin 03/02/2017 0.5  0.1 - 1.0 mg/dL Final    Comment: For infants and newborns, interpretation of results should be based  on gestational age, weight and in agreement with clinical  observations.  Premature Infant recommended reference ranges:  Up to 24 hours.............<8.0 mg/dL  Up to 48 hours............<12.0 mg/dL  3-5 days..................<15.0 mg/dL  6-29 days.................<15.0 mg/dL      Alkaline Phosphatase 03/02/2017 85  55 - 135 U/L Final    AST 03/02/2017 27  10 - 40 U/L Final    ALT 03/02/2017 19  10 - 44 U/L Final    Anion Gap 03/02/2017 10  8 - 16 mmol/L Final    eGFR if African American 03/02/2017 >60.0  >60 mL/min/1.73 m^2 Final    eGFR if non African American 03/02/2017 >60.0  >60 mL/min/1.73 m^2 Final    Comment: Calculation used to obtain the estimated glomerular filtration  rate (eGFR) is the CKD-EPI equation. Since race is unknown   in our information system, the eGFR values for   -American and Non--American patients are given   for each creatinine result.      Ferritin 03/02/2017 69  20.0 - 300.0 ng/mL Final    Magnesium 03/02/2017 2.3  1.6 - 2.6 mg/dL Final    Vit D, 25-Hydroxy 03/02/2017 39  30 - 96 ng/mL Final    Comment: Vitamin D deficiency.........<10 ng/mL                              Vitamin D insufficiency......10-29 ng/mL       Vitamin D sufficiency........> or equal to 30 ng/mL  Vitamin D toxicity............>100 ng/mL      Vitamin B-12 03/02/2017 >2000* 210 - 950 pg/mL Final    TSH 03/02/2017 0.549  0.400 - 4.000 uIU/mL Final           Vinnie Cotter

## 2017-04-25 ENCOUNTER — OFFICE VISIT (OUTPATIENT)
Dept: INTERNAL MEDICINE | Facility: CLINIC | Age: 57
End: 2017-04-25
Payer: MEDICARE

## 2017-04-25 VITALS
HEIGHT: 69 IN | SYSTOLIC BLOOD PRESSURE: 112 MMHG | WEIGHT: 202.38 LBS | DIASTOLIC BLOOD PRESSURE: 79 MMHG | HEART RATE: 70 BPM | BODY MASS INDEX: 29.98 KG/M2

## 2017-04-25 DIAGNOSIS — G89.29 CHRONIC MIDLINE LOW BACK PAIN WITHOUT SCIATICA: ICD-10-CM

## 2017-04-25 DIAGNOSIS — R42 LIGHTHEADEDNESS: ICD-10-CM

## 2017-04-25 DIAGNOSIS — M54.50 CHRONIC MIDLINE LOW BACK PAIN WITHOUT SCIATICA: ICD-10-CM

## 2017-04-25 DIAGNOSIS — I10 ESSENTIAL HYPERTENSION: ICD-10-CM

## 2017-04-25 DIAGNOSIS — R20.9 DISTURBANCE OF SKIN SENSATION: Primary | ICD-10-CM

## 2017-04-25 DIAGNOSIS — H61.21 IMPACTED CERUMEN, RIGHT EAR: ICD-10-CM

## 2017-04-25 DIAGNOSIS — M54.2 NECK PAIN: ICD-10-CM

## 2017-04-25 PROCEDURE — 1160F RVW MEDS BY RX/DR IN RCRD: CPT | Mod: S$GLB,,, | Performed by: INTERNAL MEDICINE

## 2017-04-25 PROCEDURE — 3074F SYST BP LT 130 MM HG: CPT | Mod: S$GLB,,, | Performed by: INTERNAL MEDICINE

## 2017-04-25 PROCEDURE — 3078F DIAST BP <80 MM HG: CPT | Mod: S$GLB,,, | Performed by: INTERNAL MEDICINE

## 2017-04-25 PROCEDURE — 99999 PR PBB SHADOW E&M-EST. PATIENT-LVL III: CPT | Mod: PBBFAC,,, | Performed by: INTERNAL MEDICINE

## 2017-04-25 PROCEDURE — 99214 OFFICE O/P EST MOD 30 MIN: CPT | Mod: S$GLB,,, | Performed by: INTERNAL MEDICINE

## 2017-04-25 NOTE — PATIENT INSTRUCTIONS
For removal of earwax, please use over-the-counter Debrox.  Place a few drops into the ear for 15 minutes to loosen the earwax, followed by rinsing with warm (not cold) water from a bulb syringe or from the shower.   Spoke with Von and explained everything to him and I also let him know that I called his pharmacy and they said this medication is covered under his insurance and it will cost him $10.00.

## 2017-04-25 NOTE — PROGRESS NOTES
"Subjective:       Patient ID: Roni Espinoza is a 56 y.o. male.    Chief Complaint: Follow-up    HPI    Patient is accompanied by his wife.    Last visit with me 05/2016. Since then seen by Internal Medicine, noted concerns about memory. Seen in last 6 mo by Psychiatry. Pt reports some pain in left neck and ear for last few days, also affects arm and leg, reports some limping.  Reports feeling dizzy and off balance when getting up in morning. Reports "I have to sit down otherwise whole body feels like it gets numb". Usually before taking medications. No chest pain or palpitations.    Was referred to Neurology in past, no dx of epilepsy. Hasn't had any problems with brain surgery since the operation, continues to follow up with [facility] Dr Culp.  Last Neurology visit 2014, was supposed to RTC but hasn't been seen since.    Review of Systems    As per [facility]    Objective:      Physical Exam   Constitutional: No distress.   -American man whose Body mass index is 29.89 kg/(m^2).    HENT:   Left Ear: Tympanic membrane normal.   right tympanic membrane obscured by cerumen    Eyes: Conjunctivae and EOM are normal. Pupils are equal, round, and reactive to light. Right eye exhibits no discharge. Left eye exhibits no discharge.   Neck: No thyromegaly present.   Tender to deep palpation in right anterior neck, no masses   Pulmonary/Chest: No stridor.   Musculoskeletal:   5/5 strength bilateral upper extremities in shoulder, also bilateral lower extremities throughout   Lymphadenopathy:     He has no cervical adenopathy.   Neurological: He has normal reflexes. A sensory deficit (reports paresthesia to light touch in lateral RLE near knee) is present. No cranial nerve deficit. He exhibits normal muscle tone.   Nursing note and vitals reviewed.      Vitals:    04/25/17 1313   BP: 112/79   BP Location: Right arm   Patient Position: Sitting   BP Method: Manual   Pulse: 70   Weight: 91.8 kg (202 lb 6.1 oz)   Height: 5' 9" (1.753 m) "     Body mass index is 29.89 kg/(m^2).    RESULTS: Reviewed labs from last 3 months. Reviewed ultrasound and stress test from last mo.    Assessment:       1. Disturbance of skin sensation    2. Lightheadedness    3. Essential hypertension    4. Neck pain    5. Chronic midline low back pain without sciatica    6. Impacted cerumen, right ear        Plan:   Roni was seen today for follow-up.    Diagnoses and all orders for this visit:    Disturbance of skin sensation:  Unclear etiology.  Patient has diagnosis of epilepsy, but his wife reports that this was made in error.  Will refer to neurology for evaluation, last neurology visit was in 2014, plan had been to follow-up in 3 months but patient was lost to follow-up.  -     Cancel: Ambulatory Referral to Neurology  -     Ambulatory Referral to Neurology    Lightheadedness:  Blood pressure within normal limits today, unclear if lightheadedness is due to orthostasis as this occurs in the morning prior to medications.  If the neurological evaluation is normal and the lightheadedness persists, try stopping hydrochlorothiazide.    Essential hypertension:  Prior diagnosis, well controlled on current management. No changes at this time, will continue to monitor.   -     Basic metabolic panel; Future  -     Lipid panel; Future    Neck pain:  May be muscular, evaluated with neurology, if evaluation is normal will refer to physical therapy.    Chronic midline low back pain without sciatica:  Evaluate with neurology, if no concerning features will refer to physical therapy.    Impacted cerumen, right ear:  Use over-the-counter Debrox to help loosen earwax, followed by gentle irrigation.      Return in about 8 weeks (around 6/20/2017) for fasting labs 1 week prior.  If Neuro evaluation is normal, refer to PT for upper neck and lower back pain. Also if lightheadedness persists try stopping HCTZ.  Shelton Cason MD  Internal Medicine    Portions of this note were completed using  Dragon medical dictation software. Please excuse typographical or syntax errors.

## 2017-04-25 NOTE — MR AVS SNAPSHOT
[facility]  [address]  [facility] [address]  Phone: [phone]  Fax: [phone]                  Roni Espinoza   2017 1:00 PM   Office Visit    Description:  Male : 1960   Provider:  Shelton Cason MD   Department:  [facility]           Reason for Visit     Follow-up           Diagnoses this Visit        Comments    Disturbance of skin sensation    -  Primary     Lightheadedness         Essential hypertension         Neck pain         Chronic midline low back pain without sciatica         Impacted cerumen, right ear                To Do List           Goals (5 Years of Data)     None      Follow-Up and Disposition     Return in about 8 weeks (around 2017) for fasting labs 1 week prior.    Follow-up and Disposition History      [facility]     [facility] On Call Nurse Care Line -  Assistance  Unless otherwise directed by your provider, please contact [facility], our nurse care line that is available for  assistance.     Registered nurses in the [facility] On Call Center provide: appointment scheduling, clinical advisement, health education, and other advisory services.  Call: [phone] (toll free)               Medications           Message regarding Medications     Verify the changes and/or additions to your medication regime listed below are the same as discussed with your clinician today.  If any of these changes or additions are incorrect, please notify your healthcare provider.             Verify that the below list of medications is an accurate representation of the medications you are currently taking.  If none reported, the list may be blank. If incorrect, please contact your healthcare provider. Carry this list with you in case of emergency.           Current Medications     ferrous sulfate 325 mg (65 mg iron) Tab tablet Take 1 tablet (325 mg total) by mouth every 12 (twelve) hours.    folic acid-vit B6-vit B12  "2.5-25-2 mg (FOLBIC) 2.5-25-2 mg Tab Take 1 tablet by mouth once daily.    hydrochlorothiazide (HYDRODIURIL) 25 MG tablet Take 0.5 tablets (12.5 mg total) by mouth once daily.    lamotrigine (LAMICTAL) 200 MG tablet TAKE 1 TABLET ONE TIME DAILY    pantoprazole (PROTONIX) 40 MG tablet Take 1 tablet (40 mg total) by mouth before breakfast.    risperidone (RISPERDAL) 1 MG tablet Take 1 tablet (1 mg total) by mouth every evening.    sertraline (ZOLOFT) 100 MG tablet     potassium chloride SA (K-DUR,KLOR-CON) 10 MEQ tablet Take 2 tablets (20 mEq total) by mouth once daily.    tadalafil (CIALIS) 20 MG Tab Take 1 tablet (20 mg total) by mouth once daily.           Clinical Reference Information           Your Vitals Were     BP Pulse Height Weight BMI    112/79 (BP Location: Right arm, Patient Position: Sitting, BP Method: Manual) 70 5' 9" (1.753 m) 91.8 kg (202 lb 6.1 oz) 29.89 kg/m2      Blood Pressure          Most Recent Value    BP  112/79      Allergies as of 4/25/2017     No Known Allergies      Immunizations Administered on Date of Encounter - 4/25/2017     None      Orders Placed During Today's Visit      Normal Orders This Visit    Ambulatory Referral to Neurology     Future Labs/Procedures Expected by Expires    Basic metabolic panel  4/25/2017 4/25/2018    Lipid panel  4/25/2017 6/24/2018      Instructions    For removal of earwax, please use over-the-counter Debrox.  Place a few drops into the ear for 15 minutes to loosen the earwax, followed by rinsing with warm (not cold) water from a bulb syringe or from the shower.       Language Assistance Services     ATTENTION: Language assistance services are available, free of charge. Please call [phone].      ATENCIÓN: Si natalia loredo, tiene a laguerre disposición servicios gratuitos de asistencia lingüística. Llame al [phone].     CHÚ Ý: N?u b?n nói Ti?ng Vi?t, có các d?ch v? h? tr? ngôn ng? mi?n phí dành cho b?n. G?i s? [phone].         [facility] complies with applicable Federal civil rights laws and does not discriminate on the basis of race, color, national origin, age, disability, or sex.

## 2017-04-30 ENCOUNTER — PATIENT MESSAGE (OUTPATIENT)
Dept: INTERNAL MEDICINE | Facility: CLINIC | Age: 57
End: 2017-04-30

## 2017-05-04 RX ORDER — SERTRALINE HYDROCHLORIDE 100 MG/1
TABLET, FILM COATED ORAL
Qty: 90 TABLET | Refills: 1 | Status: SHIPPED | OUTPATIENT
Start: 2017-05-04 | End: 2017-08-24 | Stop reason: SDUPTHER

## 2017-05-04 RX ORDER — RISPERIDONE 1 MG/1
TABLET ORAL
Qty: 90 TABLET | Refills: 0 | Status: SHIPPED | OUTPATIENT
Start: 2017-05-04 | End: 2017-08-21 | Stop reason: SDUPTHER

## 2017-05-04 RX ORDER — LAMOTRIGINE 200 MG/1
TABLET ORAL
Qty: 90 TABLET | Refills: 1 | Status: SHIPPED | OUTPATIENT
Start: 2017-05-04 | End: 2017-08-24 | Stop reason: SDUPTHER

## 2017-05-30 ENCOUNTER — PATIENT MESSAGE (OUTPATIENT)
Dept: PSYCHIATRY | Facility: CLINIC | Age: 57
End: 2017-05-30

## 2017-06-19 ENCOUNTER — HOSPITAL ENCOUNTER (OUTPATIENT)
Dept: RADIOLOGY | Facility: HOSPITAL | Age: 57
Discharge: HOME | End: 2017-06-19
Attending: INTERNAL MEDICINE
Payer: MEDICARE

## 2017-06-19 ENCOUNTER — OFFICE VISIT (OUTPATIENT)
Dept: INTERNAL MEDICINE | Facility: CLINIC | Age: 57
End: 2017-06-19
Payer: MEDICARE

## 2017-06-19 VITALS
HEART RATE: 66 BPM | BODY MASS INDEX: 29.71 KG/M2 | DIASTOLIC BLOOD PRESSURE: 82 MMHG | SYSTOLIC BLOOD PRESSURE: 126 MMHG | HEIGHT: 69 IN | WEIGHT: 200.63 LBS

## 2017-06-19 DIAGNOSIS — E87.6 HYPOKALEMIA: ICD-10-CM

## 2017-06-19 DIAGNOSIS — D50.9 IRON DEFICIENCY ANEMIA, UNSPECIFIED IRON DEFICIENCY ANEMIA TYPE: ICD-10-CM

## 2017-06-19 DIAGNOSIS — R20.2 PARESTHESIAS IN RIGHT HAND: ICD-10-CM

## 2017-06-19 DIAGNOSIS — I10 ESSENTIAL HYPERTENSION: ICD-10-CM

## 2017-06-19 DIAGNOSIS — M25.561 ACUTE PAIN OF RIGHT KNEE: ICD-10-CM

## 2017-06-19 DIAGNOSIS — M25.561 ACUTE PAIN OF RIGHT KNEE: Primary | ICD-10-CM

## 2017-06-19 PROCEDURE — 73560 X-RAY EXAM OF KNEE 1 OR 2: CPT | Mod: TC,LT

## 2017-06-19 PROCEDURE — 73560 X-RAY EXAM OF KNEE 1 OR 2: CPT | Mod: 26,59,LT, | Performed by: RADIOLOGY

## 2017-06-19 PROCEDURE — 99999 PR PBB SHADOW E&M-EST. PATIENT-LVL III: CPT | Mod: PBBFAC,,, | Performed by: INTERNAL MEDICINE

## 2017-06-19 PROCEDURE — 99213 OFFICE O/P EST LOW 20 MIN: CPT | Mod: S$GLB,,, | Performed by: INTERNAL MEDICINE

## 2017-06-19 PROCEDURE — 73562 X-RAY EXAM OF KNEE 3: CPT | Mod: 26,RT,, | Performed by: RADIOLOGY

## 2017-06-19 RX ORDER — ARIPIPRAZOLE 5 MG/1
TABLET ORAL
COMMUNITY
Start: 2017-04-26 | End: 2017-08-24 | Stop reason: ALTCHOICE

## 2017-06-19 NOTE — PROGRESS NOTES
"Subjective:       Patient ID: Roni Espinoza is a 56 y.o. male.    Chief Complaint: Knee Pain (both knees)    HPI    Last visit with me 04/2017. Referred to Neurology, has appointment later this week, also upcoming appointment with Psychiatry.    R knee with swelling and weakness, reports "cracking" when moving. Going on for 2 weeks. Stumble but no falls. No locking. Sometimes feels weak and shaky. Had some falls on right knee in past but no surgery. L knee is fine. Some mild tense pitting edema in RLE when it started 2 weeks ago, since resolved.    In RUE gets paresthesias that come and go. Reports sometimes hand goes dead but can still move fingers. L arm has been okay.    Review of Systems    As per HPI    Objective:      Physical Exam   Constitutional: No distress.   -American man whose Body mass index is 29.63 kg/m².    Cardiovascular:   Pulses:       Radial pulses are 2+ on the right side.   Musculoskeletal:   5/5 strength in bilateral upper extremities.  intact bilaterally. 5/5 hip flexion R leg. Trace effusion in right knee without tenderness to palpation or erythema. L knee is normal.   Neurological: He is alert.   Reflex Scores:       Bicep reflexes are 2+ on the right side and 2+ on the left side.  Psychiatric: He has a normal mood and affect. His behavior is normal.   Nursing note and vitals reviewed.      Vitals:    06/19/17 1521   BP: 126/82   BP Location: Right arm   Patient Position: Sitting   BP Method: Manual   Pulse: 66   Weight: 91 kg (200 lb 9.9 oz)   Height: 5' 9" (1.753 m)     Body mass index is 29.63 kg/m².    RESULTS: Reviewed labs from last 12 months    Assessment:       1. Acute pain of right knee    2. Iron deficiency anemia, unspecified iron deficiency anemia type    3. Paresthesias in right hand    4. Essential hypertension    5. Hypokalemia        Plan:   Roni was seen today for knee pain.    Diagnoses and all orders for this visit:    Acute pain of right knee:  rule out " osteoarthritis, if xray shows no significant problem refer to PT for treatment, otherwise Orthopedics.  -     X-ray Knee Ortho Right; Future    Iron deficiency anemia, unspecified iron deficiency anemia type:  Seen on prior labs, was on iron by GI, hasn't had recheck for a long time.   -     CBC Without Differential; Future  -     Ferritin; Future  -     Iron and TIBC; Future    Paresthesias in right hand:  Keep appointment with Neurology this week.    Essential hypertension:  Prior diagnosis, well controlled on current management. No changes at this time, will continue to monitor.   -     Basic metabolic panel; Future  -     Lipid panel; Future    Hypokalemia:  Continue supplement, recheck levels.  -     Magnesium; Future    Return in about 3 months (around 9/19/2017) for 40 min follow up visit. Labs and xray today.  Shelton Cason MD  Internal Medicine    Portions of this note were completed using [employer] dictation software. Please excuse typographical or syntax errors.

## 2017-06-20 ENCOUNTER — PATIENT MESSAGE (OUTPATIENT)
Dept: INTERNAL MEDICINE | Facility: CLINIC | Age: 57
End: 2017-06-20

## 2017-06-20 DIAGNOSIS — M17.0 OSTEOARTHRITIS OF BOTH KNEES, UNSPECIFIED OSTEOARTHRITIS TYPE: Primary | ICD-10-CM

## 2017-06-20 DIAGNOSIS — E87.6 HYPOKALEMIA: Primary | ICD-10-CM

## 2017-06-20 NOTE — TELEPHONE ENCOUNTER
Please call the patient to notify that his xray shows some mild changes that could be causing osteoarthritis. I am referring to Orthopedics for further evaluation. Please schedule this appointment with the patient or provide the number for the scheduling desk.     His labs show low potassium. Please confirm he is still taking potassium 2 tabs daily; if so please let me know so I can increase the dose, if not please have him restart 2 tabs daily. I have sent the patient a [facility] message as well.

## 2017-06-27 RX ORDER — POTASSIUM CHLORIDE 20 MEQ/1
20 TABLET, EXTENDED RELEASE ORAL DAILY
Qty: 90 TABLET | Refills: 3 | Status: SHIPPED | OUTPATIENT
Start: 2017-06-27 | End: 2017-07-27

## 2017-06-29 ENCOUNTER — OFFICE VISIT (OUTPATIENT)
Dept: NEUROLOGY | Facility: CLINIC | Age: 57
End: 2017-06-29
Payer: MEDICARE

## 2017-06-29 VITALS
BODY MASS INDEX: 30.79 KG/M2 | HEIGHT: 69 IN | SYSTOLIC BLOOD PRESSURE: 136 MMHG | HEART RATE: 64 BPM | DIASTOLIC BLOOD PRESSURE: 94 MMHG | WEIGHT: 207.88 LBS

## 2017-06-29 DIAGNOSIS — R20.2 PARESTHESIAS: Primary | ICD-10-CM

## 2017-06-29 PROCEDURE — 99214 OFFICE O/P EST MOD 30 MIN: CPT | Mod: S$GLB,,, | Performed by: PSYCHIATRY & NEUROLOGY

## 2017-06-29 PROCEDURE — 99999 PR PBB SHADOW E&M-EST. PATIENT-LVL IV: CPT | Mod: PBBFAC,,, | Performed by: PSYCHIATRY & NEUROLOGY

## 2017-06-29 NOTE — PROGRESS NOTES
"   Roni Espinoza is a 56 y.o. year old male that  presents with complains of diffuse " body sensations".     HPI:  Thanks for the kindness of permitting me to evaluate your patient.  As you know, Mr Espinoza is a 55 y/o with a medical history significant for HTN, seizures symptomatic of brain AVM s/p repair 20 years ago, depression, and anxiety, who for the past 4 or 5 months has been experiencing daily, off and on numbness-tingling-and painful pins and needles sensation " like a pinch, throbbing pain"  involving arms, legs, and his upper and middle back.   Stated that his symptoms are more prominent in the right side and actually were initially localized to the right side and subsequently moved to the left. No genitalia involvement.  He can not identify a precipitating cause for such symptoms.   Frequent HA but denies associated vertigo, double vision, difficulty swallowing, focal weakness, unsteadiness, slurred speech, language or vision impairment.  No recent fever, chills, rash, or systemic illnesses.      Past Medical History:   Diagnosis Date    Allergy     Anxiety     Depression     Headache     History of psychiatric hospitalization     Hx of psychiatric care     Hypertension     Psychiatric problem     Seizures     last seizure 2006    Therapy      Social History     Social History    Marital status:      Spouse name: N/A    Number of children: N/A    Years of education: N/A     Occupational History    Not on file.     Social History Main Topics    Smoking status: Never Smoker    Smokeless tobacco: Never Used    Alcohol use No    Drug use: No    Sexual activity: Yes     Partners: Female     Birth control/ protection: Post-menopausal     Other Topics Concern    Not on file     Social History Narrative    No narrative on file     Past Surgical History:   Procedure Laterality Date    BRAIN SURGERY  1990    AV malformation repair    COLONOSCOPY N/A 10/3/2016    Procedure: " "COLONOSCOPY;  Surgeon: Ehsan Odom MD;  Location: [facility] ([address] [place] [address]);  Service: Endoscopy;  Laterality: N/A;  ok for me or wes to do colonoscopy per patient would like asap     Family History   Problem Relation Age of Onset    Heart attack Brother 62    Cancer Brother     Cancer Mother     Thyroid disease Daughter     Heart disease Daughter     No Known Problems Son     Cancer Brother     Alcohol abuse Maternal Uncle     Depression Maternal Uncle     Celiac disease Neg Hx     Colon cancer Neg Hx     Esophageal cancer Neg Hx            Review of Systems  General ROS: negative for chills, fever or weight loss  Psychological ROS: negative for hallucination, depression or suicidal ideation  Ophthalmic ROS: negative for blurry vision, photophobia or eye pain  ENT ROS: negative for epistaxis, sore throat or rhinorrhea  Respiratory ROS: no cough, shortness of breath, or wheezing  Cardiovascular ROS: no chest pain or dyspnea on exertion  Gastrointestinal ROS: no abdominal pain, change in bowel habits, or black/ bloody stools  Genito-Urinary ROS: no dysuria, trouble voiding, or hematuria  Musculoskeletal ROS: negative for gait disturbance or muscular weakness  Neurological ROS: no syncope or seizures; no ataxia  Dermatological ROS: negative for pruritis, rash and jaundice      Physical Exam:  BP (!) 136/94   Pulse 64   Ht 5' 9" (1.753 m)   Wt 94.3 kg (207 lb 14.3 oz)   BMI 30.70 kg/m²   General appearance: alert, cooperative, no distress  Constitutional:Oriented to person, place, and time.appears well-developed and well-nourished.  HEENT: Normocephalic, atraumatic, neck symmetrical, no nasal discharge   Eyes: conjunctivae/corneas clear, PERRL, EOM's intact  Lungs: clear to auscultation bilaterally, no dullness to percussion bilaterally  Heart: regular rate and rhythm without rub; no displacement of the PMI   Abdomen: soft, non-tender; bowel sounds normoactive; no organomegaly  Extremities: " extremities symmetric; no clubbing, cyanosis, or edema  Integument: Skin color, texture, turgor normal; no rashes; hair distrubution normal  Neurologic:   Mental status: alert and awake, oriented x 4, comprehension, naming, and repetition intact. No right to left confusion. Performs serial 7's without difficulty .No dysarthria.  CN 2-12: pupils 4 mm bilaterally, reactive to light. Fundi without papilledema. Visual fields full to confrontation. EOM full without nystagmus. Face sensation normal in all distributions. Face symmetric. Hearing grossly intact. Palate elevates well. Tongue midline without atrophy or fasciculations.  Motor: 5/5 all over  Sensory: inconsistent exam but I can not identify ostensible sensory abnormality on exam.  DTR's: 2+ all over.  Plantars: no tested.  Coordination: finger to nose and heel-knee-shin intact.  Gait: no ataxia   Psychiatric: no pressured speech; normal affect; no evidence of impaired cognition     LABS:    Complete Blood Count  Lab Results   Component Value Date    RBC 4.49 (L) 06/19/2017    HGB 13.8 (L) 06/19/2017    HCT 41.0 06/19/2017    MCV 91 06/19/2017    MCH 30.7 06/19/2017    MCHC 33.7 06/19/2017    RDW 14.7 (H) 06/19/2017     06/19/2017    MPV 11.0 06/19/2017    GRAN 4.2 03/02/2017    GRAN 64.8 03/02/2017    LYMPH 1.4 03/02/2017    LYMPH 22.2 03/02/2017    MONO 0.7 03/02/2017    MONO 10.0 03/02/2017    EOS 0.2 03/02/2017    BASO 0.02 03/02/2017    EOSINOPHIL 2.5 03/02/2017    BASOPHIL 0.3 03/02/2017    DIFFMETHOD Automated 03/02/2017       Comprehensive Metabolic Panel  Lab Results   Component Value Date    GLU 78 06/19/2017    BUN 15 06/19/2017    CREATININE 1.3 06/19/2017     06/19/2017    K 3.3 (L) 06/19/2017     06/19/2017    PROT 7.5 03/02/2017    ALBUMIN 3.9 03/02/2017    BILITOT 0.5 03/02/2017    AST 27 03/02/2017    ALKPHOS 85 03/02/2017    CO2 29 06/19/2017    ALT 19 03/02/2017    ANIONGAP 11 06/19/2017    EGFRNONAA >60.0 06/19/2017     ESTGFRAFRICA >60.0 06/19/2017       TSH  Lab Results   Component Value Date    TSH 0.549 03/02/2017         Assessment:  55 y/o with chronic intermittent and diffuse body paresthesias with a neuro-exam that is non localizing.  In fact, his sensory exam is rather inconsistent.  I certainly can not localized his symptoms to a specific segment of the neuro-axis but unquestionably his presentation is not consistent with a peripheral process.    ICD-10-CM ICD-9-CM    1. Paresthesias R20.2 782.0 MRI Thoracic Spine W WO Contrast      MRI Cervical Spine With Contrast     The encounter diagnosis was Paresthesias.      Plan:   MRI brain and cervico-thoracic region  Orders Placed This Encounter   Procedures    MRI Thoracic Spine W WO Contrast    MRI Cervical Spine With Contrast   Follow up after testing.        Steven Rueda MD

## 2017-06-29 NOTE — LETTER
June 29, 2017      Shelton Cason MD  [address]  [place] [address]           [facility]  [address]  [place] [address]  Phone: [phone]          Patient: Roni Espinoza   MR Number: [id number]   YOB: 1960   Date of Visit: 6/29/2017       Dear Dr. Shelton Cason:    Thank you for referring Roni Espinoza to me for evaluation. Attached you will find relevant portions of my assessment and plan of care.    If you have questions, please do not hesitate to call me. I look forward to following Roni Espinoza along with you.    Sincerely,    Steven Rueda MD    Enclosure  CC:  No Recipients    If you would like to receive this communication electronically, please contact [email or web address] or [phone] to request more information on [employer] Link access.    For providers and/or their staff who would like to refer a patient to [facility], please contact us through our one-stop-shop provider referral line, [facility], at [phone].    If you feel you have received this communication in error or would no longer like to receive these types of communications, please e-mail [email or web address]

## 2017-08-20 ENCOUNTER — PATIENT MESSAGE (OUTPATIENT)
Dept: PSYCHIATRY | Facility: CLINIC | Age: 57
End: 2017-08-20

## 2017-08-21 RX ORDER — RISPERIDONE 1 MG/1
1 TABLET ORAL NIGHTLY
Qty: 90 TABLET | Refills: 0 | Status: SHIPPED | OUTPATIENT
Start: 2017-08-21 | End: 2017-08-24 | Stop reason: SDUPTHER

## 2017-08-24 ENCOUNTER — OFFICE VISIT (OUTPATIENT)
Dept: PSYCHIATRY | Facility: CLINIC | Age: 57
End: 2017-08-24
Payer: MEDICARE

## 2017-08-24 VITALS
BODY MASS INDEX: 30.31 KG/M2 | HEART RATE: 94 BPM | DIASTOLIC BLOOD PRESSURE: 73 MMHG | SYSTOLIC BLOOD PRESSURE: 117 MMHG | HEIGHT: 69 IN | WEIGHT: 204.63 LBS

## 2017-08-24 DIAGNOSIS — F39 MOOD DISORDER: Primary | ICD-10-CM

## 2017-08-24 PROCEDURE — 3074F SYST BP LT 130 MM HG: CPT | Mod: S$GLB,,, | Performed by: PSYCHIATRY & NEUROLOGY

## 2017-08-24 PROCEDURE — 3008F BODY MASS INDEX DOCD: CPT | Mod: S$GLB,,, | Performed by: PSYCHIATRY & NEUROLOGY

## 2017-08-24 PROCEDURE — 99213 OFFICE O/P EST LOW 20 MIN: CPT | Mod: S$GLB,,, | Performed by: PSYCHIATRY & NEUROLOGY

## 2017-08-24 PROCEDURE — 3078F DIAST BP <80 MM HG: CPT | Mod: S$GLB,,, | Performed by: PSYCHIATRY & NEUROLOGY

## 2017-08-24 PROCEDURE — 99999 PR PBB SHADOW E&M-EST. PATIENT-LVL II: CPT | Mod: PBBFAC,,, | Performed by: PSYCHIATRY & NEUROLOGY

## 2017-08-24 RX ORDER — LAMOTRIGINE 200 MG/1
TABLET ORAL
Qty: 90 TABLET | Refills: 1 | Status: SHIPPED | OUTPATIENT
Start: 2017-08-24 | End: 2018-01-08 | Stop reason: SDUPTHER

## 2017-08-24 RX ORDER — SERTRALINE HYDROCHLORIDE 100 MG/1
100 TABLET, FILM COATED ORAL DAILY
Qty: 90 TABLET | Refills: 1 | Status: SHIPPED | OUTPATIENT
Start: 2017-08-24 | End: 2017-12-19 | Stop reason: SDUPTHER

## 2017-08-24 RX ORDER — RISPERIDONE 1 MG/1
1 TABLET ORAL NIGHTLY
Qty: 90 TABLET | Refills: 1 | Status: SHIPPED | OUTPATIENT
Start: 2017-08-24 | End: 2017-12-20 | Stop reason: SDUPTHER

## 2017-08-24 NOTE — PROGRESS NOTES
ESTABLISHED OUTPATIENT VISIT   E/M LEVEL 3: [id number]    ENCOUNTER DATE: 8/24/2017  SITE: [facility], [facility]    HISTORY    CHIEF COMPLAINT   Roni Espinoza is a 56 y.o. male who presents for follow up of Cognitive d/o N.O.S., Mood d/o N.O.S., Psychosis N.O.S.  .    HPI     States, that he has been spending time with his.    Reports, that at times he hears the doorbell ring[but it has not rung].    Has not been going to the [employer], because he cannot afford the transportation.    States, that he drank wine on one day, however recently has not drunk any alcohol.    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    Watches football.    Shows a sense of humor during today's visit.    Psychiatric Review Of Systems - Is patient experiencing or having changes in:  sleep: at times takes naps during the day  appetite: no  weight: has lost 4 lb's since previous visit.  energy/anergy: no  interest/pleasure/anhedonia: no  somatic symptoms: no  libido: no  anxiety/panic: no  guilty/hopelessness: no  concentration: no  S.I.B.s/risky behavior: no  Irritability: no  Racing thoughts: no  Impulsive behaviors: no  Paranoia:no  AVH:no    Recent alcohol: no  Recent drug: no    Medical ROS   Pain in swollen right knee     PAST MEDICAL, FAMILY AND SOCIAL HISTORY: The patient's past medical, family and social history have been reviewed and updated as appropriate within the electronic medical record - see encounter notes.    PSYCHOTROPIC MEDICATIONS   Lamictal 200 mg qam, Zoloft 100 mg qam, Risperdal 1 mg at bedtime, Folbic at bedtime    EXAMINATION    Vitals:    08/24/17 1219   BP: 117/73   Pulse: 94     Weight: 204 lb's    CONSTITUTIONAL  General Appearance: well nourished    MUSCULOSKELETAL  Muscle Strength and Tone: normal strength and tone  Abnormal Involuntary Movements: no abnormal movement noted  Gait and Station: normal gait    PSYCHIATRIC   Level of Consciousness: alert  Orientation: states, that today is 3/9/17,  knows that today is Wednesday  Grooming: well groomed  Psychomotor Behavior: no restlessness/agitation  Speech: normal in rate, rhythm and volume  Language: normal vocabulary  Mood: steady  Affect: appropriate  Thought Process: logical and goal directed  Associations: intact associations  Thought Content: no SI/HI  Memory: grossly intact  Attention: intact to content of interview  Fund of Knowledge: appears adequate  Insight: good  Judgement: good    MEDICAL DECISION MAKING    DIAGNOSES  Cognitive d/o N.O.S., Mood d/o N.O.S., Psychosis N.O.S.    PROBLEM LIST AND MANAGEMENT PLANS    - mood d/o, psychosis: continue above meds  - rtc 2 months     Time with patient: 20 min    LABORATORY DATA  Lab Visit on 06/19/2017   Component Date Value Ref Range Status    WBC 06/19/2017 8.56  3.90 - 12.70 K/uL Final    RBC 06/19/2017 4.49* 4.60 - 6.20 M/uL Final    Hemoglobin 06/19/2017 13.8* 14.0 - 18.0 g/dL Final    Hematocrit 06/19/2017 41.0  40.0 - 54.0 % Final    MCV 06/19/2017 91  82 - 98 fL Final    MCH 06/19/2017 30.7  27.0 - 31.0 pg Final    MCHC 06/19/2017 33.7  32.0 - 36.0 % Final    RDW 06/19/2017 14.7* 11.5 - 14.5 % Final    Platelets 06/19/2017 237  150 - 350 K/uL Final    MPV 06/19/2017 11.0  9.2 - 12.9 fL Final    Ferritin 06/19/2017 92  20.0 - 300.0 ng/mL Final    Iron 06/19/2017 54  45 - 160 ug/dL Final    Transferrin 06/19/2017 201  200 - 375 mg/dL Final    TIBC 06/19/2017 297  250 - 450 ug/dL Final    Saturated Iron 06/19/2017 18* 20 - 50 % Final    Sodium 06/19/2017 140  136 - 145 mmol/L Final    Potassium 06/19/2017 3.3* 3.5 - 5.1 mmol/L Final    Chloride 06/19/2017 100  95 - 110 mmol/L Final    CO2 06/19/2017 29  23 - 29 mmol/L Final    Glucose 06/19/2017 78  70 - 110 mg/dL Final    BUN, Bld 06/19/2017 15  6 - 20 mg/dL Final    Creatinine 06/19/2017 1.3  0.5 - 1.4 mg/dL Final    Calcium 06/19/2017 9.7  8.7 - 10.5 mg/dL Final    Anion Gap 06/19/2017 11  8 - 16 mmol/L Final    eGFR if   06/19/2017 >60.0  >60 mL/min/1.73 m^2 Final    eGFR if non African American 06/19/2017 >60.0  >60 mL/min/1.73 m^2 Final    Comment: Calculation used to obtain the estimated glomerular filtration  rate (eGFR) is the CKD-EPI equation. Since race is unknown   in our information system, the eGFR values for   -American and Non--American patients are given   for each creatinine result.      Cholesterol 06/19/2017 266* 120 - 199 mg/dL Final    Comment: The National Cholesterol Education Program (NCEP) has set the  following guidelines (reference ranges) for Cholesterol:  Optimal.....................<200 mg/dL  Borderline High.............200-239 mg/dL  High........................> or = 240 mg/dL      Triglycerides 06/19/2017 105  30 - 150 mg/dL Final    Comment: The National Cholesterol Education Program (NCEP) has set the  following guidelines (reference values) for triglycerides:  Normal......................<150 mg/dL  Borderline High.............150-199 mg/dL  High........................200-499 mg/dL      HDL 06/19/2017 57  40 - 75 mg/dL Final    Comment: The National Cholesterol Education Program (NCEP) has set the  following guidelines (reference values) for HDL Cholesterol:  Low...............<40 mg/dL  Optimal...........>60 mg/dL      LDL Cholesterol 06/19/2017 188.0* 63.0 - 159.0 mg/dL Final    Comment: The National Cholesterol Education Program (NCEP) has set the  following guidelines (reference values) for LDL Cholesterol:  Optimal.......................<130 mg/dL  Borderline High...............130-159 mg/dL  High..........................160-189 mg/dL  Very High.....................>190 mg/dL      HDL/Chol Ratio 06/19/2017 21.4  20.0 - 50.0 % Final    Total Cholesterol/HDL Ratio 06/19/2017 4.7  2.0 - 5.0 Final    Non-HDL Cholesterol 06/19/2017 209  mg/dL Final    Comment: Risk category and Non-HDL cholesterol goals:  Coronary heart disease (CHD)or equivalent (10-year  risk of CHD >20%):  Non-HDL cholesterol goal     <130 mg/dL  Two or more CHD risk factors and 10-year risk of CHD <= 20%:  Non-HDL cholesterol goal     <160 mg/dL  0 to 1 CHD risk factor:  Non-HDL cholesterol goal     <190 mg/dL      Magnesium 06/19/2017 2.4  1.6 - 2.6 mg/dL Final           Vinnie PAINTER Shaheensinaimichael        ESTABLISHED OUTPATIENT VISIT   E/M LEVEL 4: [id number]    ENCOUNTER DATE: 8/24/2017  SITE: [facility] [address]    HISTORY    CHIEF COMPLAINT   Roni Espinoza is a 56 y.o. male who presents for follow up of Cognitive d/o N.O.S., Mood d/o N.O.S., Psychosis N.O.S.  .    HPI     Reports hearing the voice of his shadow telling him demeaning things. Pt. Upset during today's visit, tearful, says that he is confused about what is happening with him.    Sexual functioning has not improved significantly since discontinuation of Risperidone.    Psychiatric Review Of Systems - Is patient experiencing or having changes in:  sleep: poor recently  appetite: no  weight: no  energy/anergy: no  interest/pleasure/anhedonia: no  somatic symptoms: no  libido: no  anxiety/panic: no  guilty/hopelessness: no  concentration: no  S.I.B.s/risky behavior: no  Irritability: no  Racing thoughts: no  Impulsive behaviors: no  Paranoia:no  AVH:no    Recent alcohol: no  Recent drug: no    Medical ROS   Denies any current physical complaint     PAST MEDICAL, FAMILY AND SOCIAL HISTORY: The patient's past medical, family and social history have been reviewed and updated as appropriate within the electronic medical record - see encounter notes.    PSYCHOTROPIC MEDICATIONS   Lamictal 200 mg qam, Zoloft 100 mg qam, Abilify 5 mg qam, Folbic at bedtime    EXAMINATION    Vitals:    08/24/17 1219   BP: 117/73   Pulse: 94     Weight: 207 lb's    CONSTITUTIONAL  General Appearance: well nourished    MUSCULOSKELETAL  Muscle Strength and Tone: normal strength and tone  Abnormal Involuntary Movements: no abnormal movement noted  Gait and  Station: normal gait    PSYCHIATRIC   Level of Consciousness: alert  Orientation: says, that today is the 4th of November 2016  Grooming: well groomed  Psychomotor Behavior: no restlessness/agitation  Speech: normal in rate, rhythm and volume  Language: normal vocabulary  Mood: depressed at times  Affect: tearful  Thought Process: logical and goal directed  Associations: intact associations  Thought Content: no SI/HI  Memory: grossly intact  Attention: intact to content of interview  Fund of Knowledge: appears adequate  Insight: good  Judgement: good    MEDICAL DECISION MAKING    DIAGNOSES  Cognitive d/o N.O.S., Mood d/o N.O.S., Psychosis N.O.S.    PROBLEM LIST AND MANAGEMENT PLANS    - mood d/o, psychosis: continue above meds  - rtc 2-3 months     Time with patient: 20 min    LABORATORY DATA  Lab Visit on 06/19/2017   Component Date Value Ref Range Status    WBC 06/19/2017 8.56  3.90 - 12.70 K/uL Final    RBC 06/19/2017 4.49* 4.60 - 6.20 M/uL Final    Hemoglobin 06/19/2017 13.8* 14.0 - 18.0 g/dL Final    Hematocrit 06/19/2017 41.0  40.0 - 54.0 % Final    MCV 06/19/2017 91  82 - 98 fL Final    MCH 06/19/2017 30.7  27.0 - 31.0 pg Final    MCHC 06/19/2017 33.7  32.0 - 36.0 % Final    RDW 06/19/2017 14.7* 11.5 - 14.5 % Final    Platelets 06/19/2017 237  150 - 350 K/uL Final    MPV 06/19/2017 11.0  9.2 - 12.9 fL Final    Ferritin 06/19/2017 92  20.0 - 300.0 ng/mL Final    Iron 06/19/2017 54  45 - 160 ug/dL Final    Transferrin 06/19/2017 201  200 - 375 mg/dL Final    TIBC 06/19/2017 297  250 - 450 ug/dL Final    Saturated Iron 06/19/2017 18* 20 - 50 % Final    Sodium 06/19/2017 140  136 - 145 mmol/L Final    Potassium 06/19/2017 3.3* 3.5 - 5.1 mmol/L Final    Chloride 06/19/2017 100  95 - 110 mmol/L Final    CO2 06/19/2017 29  23 - 29 mmol/L Final    Glucose 06/19/2017 78  70 - 110 mg/dL Final    BUN, Bld 06/19/2017 15  6 - 20 mg/dL Final    Creatinine 06/19/2017 1.3  0.5 - 1.4 mg/dL Final     Calcium 06/19/2017 9.7  8.7 - 10.5 mg/dL Final    Anion Gap 06/19/2017 11  8 - 16 mmol/L Final    eGFR if African American 06/19/2017 >60.0  >60 mL/min/1.73 m^2 Final    eGFR if non African American 06/19/2017 >60.0  >60 mL/min/1.73 m^2 Final    Comment: Calculation used to obtain the estimated glomerular filtration  rate (eGFR) is the CKD-EPI equation. Since race is unknown   in our information system, the eGFR values for   -American and Non--American patients are given   for each creatinine result.      Cholesterol 06/19/2017 266* 120 - 199 mg/dL Final    Comment: The National Cholesterol Education Program (NCEP) has set the  following guidelines (reference ranges) for Cholesterol:  Optimal.....................<200 mg/dL  Borderline High.............200-239 mg/dL  High........................> or = 240 mg/dL      Triglycerides 06/19/2017 105  30 - 150 mg/dL Final    Comment: The National Cholesterol Education Program (NCEP) has set the  following guidelines (reference values) for triglycerides:  Normal......................<150 mg/dL  Borderline High.............150-199 mg/dL  High........................200-499 mg/dL      HDL 06/19/2017 57  40 - 75 mg/dL Final    Comment: The National Cholesterol Education Program (NCEP) has set the  following guidelines (reference values) for HDL Cholesterol:  Low...............<40 mg/dL  Optimal...........>60 mg/dL      LDL Cholesterol 06/19/2017 188.0* 63.0 - 159.0 mg/dL Final    Comment: The National Cholesterol Education Program (NCEP) has set the  following guidelines (reference values) for LDL Cholesterol:  Optimal.......................<130 mg/dL  Borderline High...............130-159 mg/dL  High..........................160-189 mg/dL  Very High.....................>190 mg/dL      HDL/Chol Ratio 06/19/2017 21.4  20.0 - 50.0 % Final    Total Cholesterol/HDL Ratio 06/19/2017 4.7  2.0 - 5.0 Final    Non-HDL Cholesterol 06/19/2017 209  mg/dL Final     Comment: Risk category and Non-HDL cholesterol goals:  Coronary heart disease (CHD)or equivalent (10-year risk of CHD >20%):  Non-HDL cholesterol goal     <130 mg/dL  Two or more CHD risk factors and 10-year risk of CHD <= 20%:  Non-HDL cholesterol goal     <160 mg/dL  0 to 1 CHD risk factor:  Non-HDL cholesterol goal     <190 mg/dL      Magnesium 06/19/2017 2.4  1.6 - 2.6 mg/dL Final           Vinnie Cotter

## 2017-08-30 ENCOUNTER — PATIENT MESSAGE (OUTPATIENT)
Dept: PSYCHIATRY | Facility: CLINIC | Age: 57
End: 2017-08-30

## 2017-08-30 DIAGNOSIS — F39 MOOD DISORDER: ICD-10-CM

## 2017-09-14 ENCOUNTER — PATIENT MESSAGE (OUTPATIENT)
Dept: INTERNAL MEDICINE | Facility: CLINIC | Age: 57
End: 2017-09-14

## 2017-09-14 DIAGNOSIS — E78.5 HYPERLIPIDEMIA, UNSPECIFIED HYPERLIPIDEMIA TYPE: ICD-10-CM

## 2017-09-14 DIAGNOSIS — E87.6 HYPOKALEMIA: ICD-10-CM

## 2017-09-14 DIAGNOSIS — M25.562 CHRONIC PAIN OF LEFT KNEE: Primary | ICD-10-CM

## 2017-09-14 DIAGNOSIS — G89.29 CHRONIC PAIN OF LEFT KNEE: Primary | ICD-10-CM

## 2017-09-19 ENCOUNTER — OFFICE VISIT (OUTPATIENT)
Dept: INTERNAL MEDICINE | Facility: CLINIC | Age: 57
End: 2017-09-19
Payer: MEDICARE

## 2017-09-19 VITALS
DIASTOLIC BLOOD PRESSURE: 86 MMHG | BODY MASS INDEX: 31.19 KG/M2 | HEART RATE: 88 BPM | WEIGHT: 210.56 LBS | HEIGHT: 69 IN | SYSTOLIC BLOOD PRESSURE: 122 MMHG

## 2017-09-19 DIAGNOSIS — M71.21 POPLITEAL CYST, RIGHT: ICD-10-CM

## 2017-09-19 DIAGNOSIS — M25.561 CHRONIC PAIN OF BOTH KNEES: ICD-10-CM

## 2017-09-19 DIAGNOSIS — R60.0 BILATERAL LOWER EXTREMITY EDEMA: ICD-10-CM

## 2017-09-19 DIAGNOSIS — Z23 NEED FOR DIPHTHERIA-TETANUS-PERTUSSIS (TDAP) VACCINE: ICD-10-CM

## 2017-09-19 DIAGNOSIS — G89.29 CHRONIC PAIN OF BOTH KNEES: ICD-10-CM

## 2017-09-19 DIAGNOSIS — M25.562 CHRONIC PAIN OF BOTH KNEES: ICD-10-CM

## 2017-09-19 DIAGNOSIS — E87.6 HYPOKALEMIA: ICD-10-CM

## 2017-09-19 DIAGNOSIS — E78.5 HYPERLIPIDEMIA, UNSPECIFIED HYPERLIPIDEMIA TYPE: Primary | ICD-10-CM

## 2017-09-19 PROCEDURE — 3008F BODY MASS INDEX DOCD: CPT | Mod: S$GLB,,, | Performed by: INTERNAL MEDICINE

## 2017-09-19 PROCEDURE — 3074F SYST BP LT 130 MM HG: CPT | Mod: S$GLB,,, | Performed by: INTERNAL MEDICINE

## 2017-09-19 PROCEDURE — 3079F DIAST BP 80-89 MM HG: CPT | Mod: S$GLB,,, | Performed by: INTERNAL MEDICINE

## 2017-09-19 PROCEDURE — 99215 OFFICE O/P EST HI 40 MIN: CPT | Mod: S$GLB,,, | Performed by: INTERNAL MEDICINE

## 2017-09-19 PROCEDURE — 99999 PR PBB SHADOW E&M-EST. PATIENT-LVL IV: CPT | Mod: PBBFAC,,, | Performed by: INTERNAL MEDICINE

## 2017-09-19 RX ORDER — POTASSIUM CHLORIDE 20 MEQ/1
20 TABLET, EXTENDED RELEASE ORAL DAILY
COMMUNITY
End: 2020-07-18

## 2017-09-19 NOTE — PROGRESS NOTES
Subjective:       Patient ID: Roni Espinoza is a 56 y.o. male.    Chief Complaint: Follow-up (knee pain, cholesterol, fluid )    HPI    Patient is accompanied by his wife.    Last visit with me 06/2017. Since then seen by Neurology and Psychiatry. Was referred to Orthopedics for knee pain but the patient never returned the call from clinic to schedule.     Throbbing and swelling pain in knees.  Limits his ability to exercise.  Keeps him up at night.    Patient has not been eating a healthy diet.  Eats a lot of dairy, including cheese, every day.    Occasionally with some substernal chest discomfort at rest, has known hiatal hernia.  Overall GERD symptoms have not been that severe.      Significant swelling in right lower extremity, some mild swelling in left lower extremity.  Usually goes up to the mid thigh on days when it is very bad.    Review of Systems   Constitutional: Positive for activity change and unexpected weight change.   HENT: Positive for rhinorrhea and trouble swallowing. Negative for hearing loss.    Eyes: Positive for discharge and visual disturbance.   Respiratory: Negative for chest tightness and wheezing.    Cardiovascular: Positive for chest pain and palpitations.   Gastrointestinal: Negative for blood in stool, constipation, diarrhea and vomiting.   Endocrine: Negative for polydipsia and polyuria.   Genitourinary: Negative for difficulty urinating, hematuria and urgency.   Musculoskeletal: Positive for arthralgias and joint swelling. Negative for neck pain.   Neurological: Positive for weakness and headaches.   Psychiatric/Behavioral: Positive for confusion and dysphoric mood.       Objective:      Physical Exam   Constitutional: No distress.    man whose Body mass index is 31.09 kg/m².    Eyes: Pupils are equal, round, and reactive to light. Right eye exhibits no discharge. Left eye exhibits no discharge.   Neck: Normal range of motion. No thyromegaly present.   Cardiovascular:  "Normal rate, regular rhythm and normal heart sounds.    Pulmonary/Chest: Effort normal and breath sounds normal. He has no wheezes. He has no rales.   Abdominal: Soft. He exhibits no distension and no mass. There is no hepatosplenomegaly. There is no tenderness. There is no rigidity, no guarding and negative Rowland's sign.   Musculoskeletal: He exhibits edema (pitting edema up to knee in RLE, mild tense pitting edema distal to shin in LLE). He exhibits no tenderness.   Lymphadenopathy:     He has no cervical adenopathy.   Skin: Skin is warm and dry. No rash noted.   Firm nodule approx 1 cm in size in popliteal fossa   Psychiatric: He has a normal mood and affect. His behavior is normal.   Nursing note and vitals reviewed.      Vitals:    09/19/17 1404   BP: 122/86   BP Location: Right arm   Patient Position: Sitting   BP Method: Medium (Manual)   Pulse: 88   Weight: 95.5 kg (210 lb 8.6 oz)   Height: 5' 9" (1.753 m)     Body mass index is 31.09 kg/m².    RESULTS: Reviewed labs from last 8 months. Reviewed knee xray 06/2017 and stress test 03/2017.    Assessment:       1. Hyperlipidemia, unspecified hyperlipidemia type    2. Hypokalemia    3. Chronic pain of both knees    4. Bilateral lower extremity edema    5. Popliteal cyst, right    6. Need for diphtheria-tetanus-pertussis (Tdap) vaccine        Plan:   Roni was seen today for follow-up.    Diagnoses and all orders for this visit:    Hyperlipidemia, unspecified hyperlipidemia type:  Discussed starting cholesterol-lowering medication.  Patient prefers to try healthy diet and regular exercise first.  Attempt exercise as tolerated given limitation from knees.  Counseled regarding healthy diet to help lower cholesterol, including decrease in red meat and dairy. Encourage Mediterranean Diet.  -     Lipid panel; Future  -     Comprehensive metabolic panel; Future    Hypokalemia:  Taking daily potassium supplement. Recheck labs, if still low increase dose of " supplement.  -     Magnesium; Future  -     Comprehensive metabolic panel; Future    Chronic pain of both knees:  Xray from 06/2017 showed bilateral osteoarthrosis. Refer to Orthopedics.  -     Ambulatory referral to Orthopedics    Bilateral lower extremity edema:  R>L, rule out DVT. Stress Echo from 03/2017 was normal.  -     Brain natriuretic peptide; Future  -     Cancel: Cardiology Lab US Lower Extremity Veins Bilateral; Future  -     US Lower Extremity Veins Bilateral; Future    Popliteal cyst, right:  Likely secondary to osteoarthritis, check ultrasound to ensure no clot.  -     US Soft Tissue Misc; Future    Need for diphtheria-tetanus-pertussis (Tdap) vaccine  -     diphth,pertus,acell,,tetanus (BOOSTRIX) 2.5-8-5 Lf-mcg-Lf/0.5mL Susp; Inject 0.5 mLs into the muscle once.    Return in about 4 months (around 1/19/2018) for 40 min follow up visit. Assess status of chronic conditions.  Shelton Cason MD  Internal Medicine    Portions of this note were completed using [employer] dictation software. Please excuse typographical or syntax errors.

## 2017-09-19 NOTE — PATIENT INSTRUCTIONS
"One of the eating plans that has been best studied overall and has the best data behind it in terms of health benefits is the Mediterranean Diet. Of note, this is more of an overall approach to eating, rather than a strict list of foods to eat every day. I encourage you to familiarize yourself with this diet; one of the best websites I have found is:    Http://www.mediterraneanVidit.com/    There is a picture on the main page under "Weight Loss" that gives meal and portion suggestions, too.   "

## 2017-09-30 ENCOUNTER — HOSPITAL ENCOUNTER (OUTPATIENT)
Dept: RADIOLOGY | Facility: OTHER | Age: 57
Discharge: HOME | End: 2017-09-30
Attending: INTERNAL MEDICINE
Payer: MEDICARE

## 2017-09-30 DIAGNOSIS — M71.21 POPLITEAL CYST, RIGHT: ICD-10-CM

## 2017-09-30 DIAGNOSIS — R60.0 BILATERAL LOWER EXTREMITY EDEMA: ICD-10-CM

## 2017-09-30 PROCEDURE — 76882 US LMTD JT/FCL EVL NVASC XTR: CPT | Mod: 26,,, | Performed by: RADIOLOGY

## 2017-09-30 PROCEDURE — 93970 EXTREMITY STUDY: CPT | Mod: 26,,, | Performed by: RADIOLOGY

## 2017-09-30 PROCEDURE — 93970 EXTREMITY STUDY: CPT | Mod: TC

## 2017-09-30 PROCEDURE — 76999 ECHO EXAMINATION PROCEDURE: CPT | Mod: TC

## 2017-10-02 ENCOUNTER — PATIENT MESSAGE (OUTPATIENT)
Dept: INTERNAL MEDICINE | Facility: CLINIC | Age: 57
End: 2017-10-02

## 2017-10-31 DIAGNOSIS — K25.9 GASTRIC ULCER: ICD-10-CM

## 2017-11-01 DIAGNOSIS — F39 MOOD DISORDER: ICD-10-CM

## 2017-11-01 RX ORDER — RISPERIDONE 1 MG/1
TABLET ORAL
Qty: 90 TABLET | Refills: 0 | OUTPATIENT
Start: 2017-11-01

## 2017-11-01 RX ORDER — PANTOPRAZOLE SODIUM 40 MG/1
TABLET, DELAYED RELEASE ORAL
Qty: 90 TABLET | Refills: 3 | Status: SHIPPED | OUTPATIENT
Start: 2017-11-01

## 2017-11-07 DIAGNOSIS — I10 ESSENTIAL HYPERTENSION: ICD-10-CM

## 2017-11-08 RX ORDER — HYDROCHLOROTHIAZIDE 25 MG/1
TABLET ORAL
Qty: 90 TABLET | Refills: 3 | Status: SHIPPED | OUTPATIENT
Start: 2017-11-08 | End: 2019-02-21 | Stop reason: SDUPTHER

## 2017-12-04 DIAGNOSIS — K27.9 PUD (PEPTIC ULCER DISEASE): Primary | ICD-10-CM

## 2017-12-06 DIAGNOSIS — M25.562 CHRONIC PAIN OF BOTH KNEES: Primary | ICD-10-CM

## 2017-12-06 DIAGNOSIS — G89.29 CHRONIC PAIN OF BOTH KNEES: Primary | ICD-10-CM

## 2017-12-06 DIAGNOSIS — M25.561 CHRONIC PAIN OF BOTH KNEES: Primary | ICD-10-CM

## 2017-12-19 DIAGNOSIS — F39 MOOD DISORDER: ICD-10-CM

## 2017-12-20 DIAGNOSIS — F39 MOOD DISORDER: ICD-10-CM

## 2017-12-20 RX ORDER — SERTRALINE HYDROCHLORIDE 100 MG/1
TABLET, FILM COATED ORAL
Qty: 90 TABLET | Refills: 0 | Status: SHIPPED | OUTPATIENT
Start: 2017-12-20 | End: 2018-01-16 | Stop reason: SDUPTHER

## 2017-12-20 RX ORDER — RISPERIDONE 1 MG/1
TABLET ORAL
Qty: 90 TABLET | Refills: 0 | Status: SHIPPED | OUTPATIENT
Start: 2017-12-20 | End: 2018-01-16 | Stop reason: SDUPTHER

## 2018-01-08 ENCOUNTER — PATIENT MESSAGE (OUTPATIENT)
Dept: PSYCHIATRY | Facility: CLINIC | Age: 58
End: 2018-01-08

## 2018-01-08 DIAGNOSIS — F39 MOOD DISORDER: ICD-10-CM

## 2018-01-08 RX ORDER — LAMOTRIGINE 200 MG/1
TABLET ORAL
Qty: 90 TABLET | Refills: 1 | Status: SHIPPED | OUTPATIENT
Start: 2018-01-08 | End: 2018-01-16 | Stop reason: SDUPTHER

## 2018-01-16 ENCOUNTER — OFFICE VISIT (OUTPATIENT)
Dept: PSYCHIATRY | Facility: CLINIC | Age: 58
End: 2018-01-16
Payer: MEDICARE

## 2018-01-16 VITALS
HEIGHT: 69 IN | HEART RATE: 91 BPM | SYSTOLIC BLOOD PRESSURE: 130 MMHG | WEIGHT: 203 LBS | BODY MASS INDEX: 30.07 KG/M2 | DIASTOLIC BLOOD PRESSURE: 81 MMHG

## 2018-01-16 DIAGNOSIS — F39 MOOD DISORDER: ICD-10-CM

## 2018-01-16 PROCEDURE — 99999 PR PBB SHADOW E&M-EST. PATIENT-LVL II: CPT | Mod: PBBFAC,,, | Performed by: PSYCHIATRY & NEUROLOGY

## 2018-01-16 PROCEDURE — 99213 OFFICE O/P EST LOW 20 MIN: CPT | Mod: S$GLB,,, | Performed by: PSYCHIATRY & NEUROLOGY

## 2018-01-16 RX ORDER — SERTRALINE HYDROCHLORIDE 100 MG/1
100 TABLET, FILM COATED ORAL DAILY
Qty: 90 TABLET | Refills: 1 | Status: SHIPPED | OUTPATIENT
Start: 2018-01-16 | End: 2018-03-26 | Stop reason: SDUPTHER

## 2018-01-16 RX ORDER — LAMOTRIGINE 200 MG/1
TABLET ORAL
Qty: 90 TABLET | Refills: 1 | Status: SHIPPED | OUTPATIENT
Start: 2018-01-16 | End: 2018-04-03 | Stop reason: SDUPTHER

## 2018-01-16 RX ORDER — RISPERIDONE 1 MG/1
1 TABLET ORAL NIGHTLY
Qty: 90 TABLET | Refills: 1 | Status: SHIPPED | OUTPATIENT
Start: 2018-01-16 | End: 2018-03-26 | Stop reason: SDUPTHER

## 2018-01-16 NOTE — PROGRESS NOTES
ESTABLISHED OUTPATIENT VISIT   E/M LEVEL 3: [id number]    ENCOUNTER DATE: 1/16/2018  SITE: [facility] [address]    HISTORY    CHIEF COMPLAINT   Roni Espinoza is a 57 y.o. male who presents for follow up of Cognitive d/o N.O.S., Mood d/o N.O.S., Psychosis N.O.S.  .    WEI     Hears the doorbell ringing at night, but there is nobody ringing it.    Depressed because he is unable to do what he used to do.    Does some work around the house.    Psychiatric Review Of Systems - Is patient experiencing or having changes in:  sleep: at times takes naps during the day  appetite: no  Weight: no  energy/anergy: no  interest/pleasure/anhedonia: no  somatic symptoms: no  libido: no  anxiety/panic: no  guilty/hopelessness: no  concentration: no  S.I.B.s/risky behavior: no  Irritability: no  Racing thoughts: no  Impulsive behaviors: no  Paranoia:no  AVH:no    Recent alcohol: no  Recent drug: no    Medical ROS   Headache today     PAST MEDICAL, FAMILY AND SOCIAL HISTORY: The patient's past medical, family and social history have been reviewed and updated as appropriate within the electronic medical record - see encounter notes.    PSYCHOTROPIC MEDICATIONS   Lamictal 200 mg qam, Zoloft 100 mg qam, Risperdal 1 mg at bedtime, Folbic at bedtime    EXAMINATION    Vitals:    01/16/18 1247   BP: 130/81   Pulse: 91     Weight: 203 lb's    CONSTITUTIONAL  General Appearance: well nourished    MUSCULOSKELETAL  Muscle Strength and Tone: normal strength and tone  Abnormal Involuntary Movements: no abnormal movement noted  Gait and Station: normal gait    PSYCHIATRIC   Level of Consciousness: alert  Orientation: states, that today is 1/15/18, states that today is Monday[it is Tuesday]  Grooming: well groomed  Psychomotor Behavior: no restlessness/agitation  Speech: normal in rate, rhythm and volume  Language: normal vocabulary  Mood: steady  Affect: appropriate  Thought Process: logical and goal directed  Associations: intact  associations  Thought Content: no SI/HI  Memory: grossly intact  Attention: intact to content of interview  Fund of Knowledge: appears adequate  Insight: good  Judgement: good    MEDICAL DECISION MAKING    DIAGNOSES  Cognitive d/o N.O.S., Depressive d/o unspecified, Psychosis N.O.S.    PROBLEM LIST AND MANAGEMENT PLANS    - depressive d/o, psychosis: continue above meds  - rtc 2 months     Time with patient: 20 min    LABORATORY DATA  No visits with results within 3 Month(s) from this visit.   Latest known visit with results is:   Lab Visit on 06/19/2017   Component Date Value Ref Range Status    WBC 06/19/2017 8.56  3.90 - 12.70 K/uL Final    RBC 06/19/2017 4.49* 4.60 - 6.20 M/uL Final    Hemoglobin 06/19/2017 13.8* 14.0 - 18.0 g/dL Final    Hematocrit 06/19/2017 41.0  40.0 - 54.0 % Final    MCV 06/19/2017 91  82 - 98 fL Final    MCH 06/19/2017 30.7  27.0 - 31.0 pg Final    MCHC 06/19/2017 33.7  32.0 - 36.0 % Final    RDW 06/19/2017 14.7* 11.5 - 14.5 % Final    Platelets 06/19/2017 237  150 - 350 K/uL Final    MPV 06/19/2017 11.0  9.2 - 12.9 fL Final    Ferritin 06/19/2017 92  20.0 - 300.0 ng/mL Final    Iron 06/19/2017 54  45 - 160 ug/dL Final    Transferrin 06/19/2017 201  200 - 375 mg/dL Final    TIBC 06/19/2017 297  250 - 450 ug/dL Final    Saturated Iron 06/19/2017 18* 20 - 50 % Final    Sodium 06/19/2017 140  136 - 145 mmol/L Final    Potassium 06/19/2017 3.3* 3.5 - 5.1 mmol/L Final    Chloride 06/19/2017 100  95 - 110 mmol/L Final    CO2 06/19/2017 29  23 - 29 mmol/L Final    Glucose 06/19/2017 78  70 - 110 mg/dL Final    BUN, Bld 06/19/2017 15  6 - 20 mg/dL Final    Creatinine 06/19/2017 1.3  0.5 - 1.4 mg/dL Final    Calcium 06/19/2017 9.7  8.7 - 10.5 mg/dL Final    Anion Gap 06/19/2017 11  8 - 16 mmol/L Final    eGFR if African American 06/19/2017 >60.0  >60 mL/min/1.73 m^2 Final    eGFR if non African American 06/19/2017 >60.0  >60 mL/min/1.73 m^2 Final    Comment: Calculation  used to obtain the estimated glomerular filtration  rate (eGFR) is the CKD-EPI equation. Since race is unknown   in our information system, the eGFR values for   -American and Non--American patients are given   for each creatinine result.      Cholesterol 06/19/2017 266* 120 - 199 mg/dL Final    Comment: The National Cholesterol Education Program (NCEP) has set the  following guidelines (reference ranges) for Cholesterol:  Optimal.....................<200 mg/dL  Borderline High.............200-239 mg/dL  High........................> or = 240 mg/dL      Triglycerides 06/19/2017 105  30 - 150 mg/dL Final    Comment: The National Cholesterol Education Program (NCEP) has set the  following guidelines (reference values) for triglycerides:  Normal......................<150 mg/dL  Borderline High.............150-199 mg/dL  High........................200-499 mg/dL      HDL 06/19/2017 57  40 - 75 mg/dL Final    Comment: The National Cholesterol Education Program (NCEP) has set the  following guidelines (reference values) for HDL Cholesterol:  Low...............<40 mg/dL  Optimal...........>60 mg/dL      LDL Cholesterol 06/19/2017 188.0* 63.0 - 159.0 mg/dL Final    Comment: The National Cholesterol Education Program (NCEP) has set the  following guidelines (reference values) for LDL Cholesterol:  Optimal.......................<130 mg/dL  Borderline High...............130-159 mg/dL  High..........................160-189 mg/dL  Very High.....................>190 mg/dL      HDL/Chol Ratio 06/19/2017 21.4  20.0 - 50.0 % Final    Total Cholesterol/HDL Ratio 06/19/2017 4.7  2.0 - 5.0 Final    Non-HDL Cholesterol 06/19/2017 209  mg/dL Final    Comment: Risk category and Non-HDL cholesterol goals:  Coronary heart disease (CHD)or equivalent (10-year risk of CHD >20%):  Non-HDL cholesterol goal     <130 mg/dL  Two or more CHD risk factors and 10-year risk of CHD <= 20%:  Non-HDL cholesterol goal     <160 mg/dL  0  to 1 CHD risk factor:  Non-HDL cholesterol goal     <190 mg/dL      Magnesium 06/19/2017 2.4  1.6 - 2.6 mg/dL Final           Vinnie Cotter

## 2018-02-06 ENCOUNTER — PATIENT MESSAGE (OUTPATIENT)
Dept: PSYCHIATRY | Facility: CLINIC | Age: 58
End: 2018-02-06

## 2018-03-22 DIAGNOSIS — F39 MOOD DISORDER: ICD-10-CM

## 2018-03-23 RX ORDER — RISPERIDONE 1 MG/1
TABLET ORAL
Qty: 90 TABLET | Refills: 0 | OUTPATIENT
Start: 2018-03-23

## 2018-03-23 RX ORDER — ARIPIPRAZOLE 5 MG/1
TABLET ORAL
Qty: 90 TABLET | Refills: 1 | OUTPATIENT
Start: 2018-03-23

## 2018-03-23 RX ORDER — LAMOTRIGINE 200 MG/1
TABLET ORAL
Qty: 90 TABLET | Refills: 1 | OUTPATIENT
Start: 2018-03-23

## 2018-03-23 RX ORDER — SERTRALINE HYDROCHLORIDE 100 MG/1
TABLET, FILM COATED ORAL
Qty: 90 TABLET | Refills: 1 | OUTPATIENT
Start: 2018-03-23

## 2018-03-26 ENCOUNTER — PATIENT MESSAGE (OUTPATIENT)
Dept: PSYCHIATRY | Facility: CLINIC | Age: 58
End: 2018-03-26

## 2018-03-26 DIAGNOSIS — F39 MOOD DISORDER: ICD-10-CM

## 2018-03-27 RX ORDER — SERTRALINE HYDROCHLORIDE 100 MG/1
TABLET, FILM COATED ORAL
Qty: 90 TABLET | Refills: 1 | Status: SHIPPED | OUTPATIENT
Start: 2018-03-27 | End: 2018-03-28 | Stop reason: SDUPTHER

## 2018-03-27 RX ORDER — RISPERIDONE 1 MG/1
TABLET ORAL
Qty: 90 TABLET | Refills: 0 | Status: SHIPPED | OUTPATIENT
Start: 2018-03-27 | End: 2018-03-28 | Stop reason: SDUPTHER

## 2018-03-28 DIAGNOSIS — F39 MOOD DISORDER: ICD-10-CM

## 2018-03-28 RX ORDER — SERTRALINE HYDROCHLORIDE 100 MG/1
100 TABLET, FILM COATED ORAL DAILY
Qty: 15 TABLET | Refills: 0 | Status: SHIPPED | OUTPATIENT
Start: 2018-03-28 | End: 2018-10-25 | Stop reason: SDUPTHER

## 2018-03-28 RX ORDER — RISPERIDONE 1 MG/1
1 TABLET ORAL NIGHTLY
Qty: 15 TABLET | Refills: 0 | Status: SHIPPED | OUTPATIENT
Start: 2018-03-28 | End: 2018-10-25 | Stop reason: SDUPTHER

## 2018-04-03 ENCOUNTER — OFFICE VISIT (OUTPATIENT)
Dept: PSYCHIATRY | Facility: CLINIC | Age: 58
End: 2018-04-03
Payer: MEDICARE

## 2018-04-03 VITALS
DIASTOLIC BLOOD PRESSURE: 97 MMHG | HEIGHT: 69 IN | WEIGHT: 208 LBS | SYSTOLIC BLOOD PRESSURE: 150 MMHG | BODY MASS INDEX: 30.81 KG/M2 | HEART RATE: 64 BPM

## 2018-04-03 DIAGNOSIS — F39 MOOD DISORDER: ICD-10-CM

## 2018-04-03 PROCEDURE — 99999 PR PBB SHADOW E&M-EST. PATIENT-LVL II: CPT | Mod: PBBFAC,,, | Performed by: PSYCHIATRY & NEUROLOGY

## 2018-04-03 PROCEDURE — 3077F SYST BP >= 140 MM HG: CPT | Mod: CPTII,S$GLB,, | Performed by: PSYCHIATRY & NEUROLOGY

## 2018-04-03 PROCEDURE — 99213 OFFICE O/P EST LOW 20 MIN: CPT | Mod: S$GLB,,, | Performed by: PSYCHIATRY & NEUROLOGY

## 2018-04-03 PROCEDURE — 3080F DIAST BP >= 90 MM HG: CPT | Mod: CPTII,S$GLB,, | Performed by: PSYCHIATRY & NEUROLOGY

## 2018-04-03 RX ORDER — RISPERIDONE 1 MG/1
1 TABLET ORAL NIGHTLY
Qty: 90 TABLET | Refills: 1 | Status: SHIPPED | OUTPATIENT
Start: 2018-04-03 | End: 2018-07-03 | Stop reason: SDUPTHER

## 2018-04-03 RX ORDER — SERTRALINE HYDROCHLORIDE 100 MG/1
100 TABLET, FILM COATED ORAL DAILY
Qty: 90 TABLET | Refills: 1 | Status: SHIPPED | OUTPATIENT
Start: 2018-04-03 | End: 2018-07-03 | Stop reason: SDUPTHER

## 2018-04-03 RX ORDER — LAMOTRIGINE 200 MG/1
TABLET ORAL
Qty: 90 TABLET | Refills: 1 | Status: SHIPPED | OUTPATIENT
Start: 2018-04-03 | End: 2018-07-03 | Stop reason: SDUPTHER

## 2018-04-03 NOTE — PROGRESS NOTES
ESTABLISHED OUTPATIENT VISIT   E/M LEVEL 3: [id number]    ENCOUNTER DATE: 4/3/2018  SITE: [facility], [address]    HISTORY    CHIEF COMPLAINT   Roni Espinoza is a 57 y.o. male who presents for follow up of Cognitive d/o N.O.S., Mood d/o N.O.S., Psychosis N.O.S.  .    HPI     Has been working around the house.    Mood appears improved.    Disability paperwork was filled out during today's visit.    Psychiatric Review Of Systems - Is patient experiencing or having changes in:  sleep: sleeps much  appetite: no  Weight: no  energy/anergy: no  interest/pleasure/anhedonia: no  somatic symptoms: no  libido: no  anxiety/panic: no  guilty/hopelessness: no  concentration: no  S.I.B.s/risky behavior: no  Irritability: no  Racing thoughts: no  Impulsive behaviors: no  Paranoia:no  AVH:no    Recent alcohol: no  Recent drug: no    Medical ROS   Headache today     PAST MEDICAL, FAMILY AND SOCIAL HISTORY: The patient's past medical, family and social history have been reviewed and updated as appropriate within the electronic medical record - see encounter notes.    PSYCHOTROPIC MEDICATIONS   Lamictal 200 mg qam, Zoloft 100 mg qam, Risperdal 1 mg at bedtime, Folbic at bedtime    EXAMINATION    Vitals:    04/03/18 1454   BP: (!) 150/97   Pulse: 64     Weight: 208 lb's    CONSTITUTIONAL  General Appearance: well nourished    MUSCULOSKELETAL  Muscle Strength and Tone: normal strength and tone  Abnormal Involuntary Movements: no abnormal movement noted  Gait and Station: normal gait    PSYCHIATRIC   Level of Consciousness: alert  Orientation: states, that today is 1/15/18, states that today is Monday[it is Tuesday]  Grooming: well groomed  Psychomotor Behavior: no restlessness/agitation  Speech: normal in rate, rhythm and volume  Language: normal vocabulary  Mood: steady  Affect: appropriate  Thought Process: logical and goal directed  Associations: intact associations  Thought Content: no SI/HI  Memory: grossly  intact  Attention: intact to content of interview  Fund of Knowledge: appears adequate  Insight: good  Judgement: good    MEDICAL DECISION MAKING    DIAGNOSES  Cognitive d/o N.O.S., Depressive d/o unspecified, Psychosis N.O.S.    PROBLEM LIST AND MANAGEMENT PLANS    - depressive d/o, psychosis: continue above meds  - rtc 3 months     Time with patient: 20 min    LABORATORY DATA  No visits with results within 3 Month(s) from this visit.   Latest known visit with results is:   Lab Visit on 06/19/2017   Component Date Value Ref Range Status    WBC 06/19/2017 8.56  3.90 - 12.70 K/uL Final    RBC 06/19/2017 4.49* 4.60 - 6.20 M/uL Final    Hemoglobin 06/19/2017 13.8* 14.0 - 18.0 g/dL Final    Hematocrit 06/19/2017 41.0  40.0 - 54.0 % Final    MCV 06/19/2017 91  82 - 98 fL Final    MCH 06/19/2017 30.7  27.0 - 31.0 pg Final    MCHC 06/19/2017 33.7  32.0 - 36.0 % Final    RDW 06/19/2017 14.7* 11.5 - 14.5 % Final    Platelets 06/19/2017 237  150 - 350 K/uL Final    MPV 06/19/2017 11.0  9.2 - 12.9 fL Final    Ferritin 06/19/2017 92  20.0 - 300.0 ng/mL Final    Iron 06/19/2017 54  45 - 160 ug/dL Final    Transferrin 06/19/2017 201  200 - 375 mg/dL Final    TIBC 06/19/2017 297  250 - 450 ug/dL Final    Saturated Iron 06/19/2017 18* 20 - 50 % Final    Sodium 06/19/2017 140  136 - 145 mmol/L Final    Potassium 06/19/2017 3.3* 3.5 - 5.1 mmol/L Final    Chloride 06/19/2017 100  95 - 110 mmol/L Final    CO2 06/19/2017 29  23 - 29 mmol/L Final    Glucose 06/19/2017 78  70 - 110 mg/dL Final    BUN, Bld 06/19/2017 15  6 - 20 mg/dL Final    Creatinine 06/19/2017 1.3  0.5 - 1.4 mg/dL Final    Calcium 06/19/2017 9.7  8.7 - 10.5 mg/dL Final    Anion Gap 06/19/2017 11  8 - 16 mmol/L Final    eGFR if African American 06/19/2017 >60.0  >60 mL/min/1.73 m^2 Final    eGFR if non African American 06/19/2017 >60.0  >60 mL/min/1.73 m^2 Final    Comment: Calculation used to obtain the estimated glomerular filtration  rate  (eGFR) is the CKD-EPI equation. Since race is unknown   in our information system, the eGFR values for   -American and Non--American patients are given   for each creatinine result.      Cholesterol 06/19/2017 266* 120 - 199 mg/dL Final    Comment: The National Cholesterol Education Program (NCEP) has set the  following guidelines (reference ranges) for Cholesterol:  Optimal.....................<200 mg/dL  Borderline High.............200-239 mg/dL  High........................> or = 240 mg/dL      Triglycerides 06/19/2017 105  30 - 150 mg/dL Final    Comment: The National Cholesterol Education Program (NCEP) has set the  following guidelines (reference values) for triglycerides:  Normal......................<150 mg/dL  Borderline High.............150-199 mg/dL  High........................200-499 mg/dL      HDL 06/19/2017 57  40 - 75 mg/dL Final    Comment: The National Cholesterol Education Program (NCEP) has set the  following guidelines (reference values) for HDL Cholesterol:  Low...............<40 mg/dL  Optimal...........>60 mg/dL      LDL Cholesterol 06/19/2017 188.0* 63.0 - 159.0 mg/dL Final    Comment: The National Cholesterol Education Program (NCEP) has set the  following guidelines (reference values) for LDL Cholesterol:  Optimal.......................<130 mg/dL  Borderline High...............130-159 mg/dL  High..........................160-189 mg/dL  Very High.....................>190 mg/dL      HDL/Chol Ratio 06/19/2017 21.4  20.0 - 50.0 % Final    Total Cholesterol/HDL Ratio 06/19/2017 4.7  2.0 - 5.0 Final    Non-HDL Cholesterol 06/19/2017 209  mg/dL Final    Comment: Risk category and Non-HDL cholesterol goals:  Coronary heart disease (CHD)or equivalent (10-year risk of CHD >20%):  Non-HDL cholesterol goal     <130 mg/dL  Two or more CHD risk factors and 10-year risk of CHD <= 20%:  Non-HDL cholesterol goal     <160 mg/dL  0 to 1 CHD risk factor:  Non-HDL cholesterol goal     <190  mg/dL      Magnesium 06/19/2017 2.4  1.6 - 2.6 mg/dL Final           Vinnie Cotter

## 2018-06-11 RX ORDER — SERTRALINE HYDROCHLORIDE 100 MG/1
TABLET, FILM COATED ORAL
Qty: 15 TABLET | Refills: 0 | Status: SHIPPED | OUTPATIENT
Start: 2018-06-11 | End: 2018-07-17 | Stop reason: SDUPTHER

## 2018-07-03 ENCOUNTER — OFFICE VISIT (OUTPATIENT)
Dept: PSYCHIATRY | Facility: CLINIC | Age: 58
End: 2018-07-03
Payer: MEDICARE

## 2018-07-03 VITALS
HEART RATE: 80 BPM | HEIGHT: 69 IN | BODY MASS INDEX: 30.06 KG/M2 | WEIGHT: 202.94 LBS | SYSTOLIC BLOOD PRESSURE: 118 MMHG | DIASTOLIC BLOOD PRESSURE: 81 MMHG

## 2018-07-03 DIAGNOSIS — F39 MOOD DISORDER: ICD-10-CM

## 2018-07-03 PROCEDURE — 3079F DIAST BP 80-89 MM HG: CPT | Mod: CPTII,S$GLB,, | Performed by: PSYCHIATRY & NEUROLOGY

## 2018-07-03 PROCEDURE — 99213 OFFICE O/P EST LOW 20 MIN: CPT | Mod: S$GLB,,, | Performed by: PSYCHIATRY & NEUROLOGY

## 2018-07-03 PROCEDURE — 3074F SYST BP LT 130 MM HG: CPT | Mod: CPTII,S$GLB,, | Performed by: PSYCHIATRY & NEUROLOGY

## 2018-07-03 PROCEDURE — 3008F BODY MASS INDEX DOCD: CPT | Mod: CPTII,S$GLB,, | Performed by: PSYCHIATRY & NEUROLOGY

## 2018-07-03 PROCEDURE — 99999 PR PBB SHADOW E&M-EST. PATIENT-LVL II: CPT | Mod: PBBFAC,,, | Performed by: PSYCHIATRY & NEUROLOGY

## 2018-07-03 RX ORDER — SERTRALINE HYDROCHLORIDE 100 MG/1
100 TABLET, FILM COATED ORAL DAILY
Qty: 90 TABLET | Refills: 1 | Status: SHIPPED | OUTPATIENT
Start: 2018-07-03 | End: 2018-07-17 | Stop reason: SDUPTHER

## 2018-07-03 RX ORDER — LAMOTRIGINE 200 MG/1
TABLET ORAL
Qty: 90 TABLET | Refills: 1 | Status: SHIPPED | OUTPATIENT
Start: 2018-07-03 | End: 2018-10-25 | Stop reason: SDUPTHER

## 2018-07-03 RX ORDER — RISPERIDONE 1 MG/1
1 TABLET ORAL NIGHTLY
Qty: 90 TABLET | Refills: 1 | Status: SHIPPED | OUTPATIENT
Start: 2018-07-03 | End: 2018-07-17 | Stop reason: SDUPTHER

## 2018-07-03 NOTE — PROGRESS NOTES
ESTABLISHED OUTPATIENT VISIT   E/M LEVEL 3: [id number]    ENCOUNTER DATE: 7/3/2018  SITE: [facility], [facility]    HISTORY    CHIEF COMPLAINT   Roni Espinoza is a 57 y.o. male who presents for follow up of Cognitive d/o N.O.S., Mood d/o N.O.S., Psychosis N.O.S.  .    HPI     Reports confusion at times. Came to today's visit alone via bus.    No EPS noted on exam today.    Does work in the house.    Does not like being in crowds of people    States, that he hears dogs barking at times.    Denies recent seizure.    Psychiatric Review Of Systems - Is patient experiencing or having changes in:  sleep: sleeps much  appetite: no  Weight: no  energy/anergy: no  interest/pleasure/anhedonia: no  somatic symptoms: no  libido: no  anxiety/panic: no  guilty/hopelessness: no  concentration: no  S.I.B.s/risky behavior: no  Irritability: no  Racing thoughts: no  Impulsive behaviors: no  Paranoia:no  AVH:no    Recent alcohol: some wine last week  Recent drug: no    Medical ROS   Headaches at times     PAST MEDICAL, FAMILY AND SOCIAL HISTORY: The patient's past medical, family and social history have been reviewed and updated as appropriate within the electronic medical record - see encounter notes.    PSYCHOTROPIC MEDICATIONS   Lamictal 200 mg qam, Zoloft 100 mg qam, Risperdal 1 mg at bedtime, Folbic at bedtime    EXAMINATION    Vitals:    07/03/18 1420   BP: 118/81   Pulse: 80     Weight: 202 lb's    CONSTITUTIONAL  General Appearance: well nourished    MUSCULOSKELETAL  Muscle Strength and Tone: normal strength and tone  Abnormal Involuntary Movements: no abnormal movement noted  Gait and Station: normal gait    PSYCHIATRIC   Level of Consciousness: alert  Orientation: states, that today is Wednesday June 3rd 2018  Grooming: well groomed  Psychomotor Behavior: no restlessness/agitation  Speech: normal in rate, rhythm and volume  Language: normal vocabulary  Mood: steady  Affect: appropriate  Thought Process: logical  and goal directed  Associations: intact associations  Thought Content: no SI/HI  Memory: grossly intact  Attention: intact to content of interview  Fund of Knowledge: appears adequate  Insight: good  Judgement: good    MEDICAL DECISION MAKING    DIAGNOSES  Cognitive d/o N.O.S., Depressive d/o unspecified, Psychosis N.O.S.    PROBLEM LIST AND MANAGEMENT PLANS    - depressive d/o, psychosis: continue above meds  - pt. Has appointment with primary care MD in 2 weeks  - rtc 3 months     Time with patient: 20 min    LABORATORY DATA  No visits with results within 3 Month(s) from this visit.   Latest known visit with results is:   Lab Visit on 06/19/2017   Component Date Value Ref Range Status    WBC 06/19/2017 8.56  3.90 - 12.70 K/uL Final    RBC 06/19/2017 4.49* 4.60 - 6.20 M/uL Final    Hemoglobin 06/19/2017 13.8* 14.0 - 18.0 g/dL Final    Hematocrit 06/19/2017 41.0  40.0 - 54.0 % Final    MCV 06/19/2017 91  82 - 98 fL Final    MCH 06/19/2017 30.7  27.0 - 31.0 pg Final    MCHC 06/19/2017 33.7  32.0 - 36.0 % Final    RDW 06/19/2017 14.7* 11.5 - 14.5 % Final    Platelets 06/19/2017 237  150 - 350 K/uL Final    MPV 06/19/2017 11.0  9.2 - 12.9 fL Final    Ferritin 06/19/2017 92  20.0 - 300.0 ng/mL Final    Iron 06/19/2017 54  45 - 160 ug/dL Final    Transferrin 06/19/2017 201  200 - 375 mg/dL Final    TIBC 06/19/2017 297  250 - 450 ug/dL Final    Saturated Iron 06/19/2017 18* 20 - 50 % Final    Sodium 06/19/2017 140  136 - 145 mmol/L Final    Potassium 06/19/2017 3.3* 3.5 - 5.1 mmol/L Final    Chloride 06/19/2017 100  95 - 110 mmol/L Final    CO2 06/19/2017 29  23 - 29 mmol/L Final    Glucose 06/19/2017 78  70 - 110 mg/dL Final    BUN, Bld 06/19/2017 15  6 - 20 mg/dL Final    Creatinine 06/19/2017 1.3  0.5 - 1.4 mg/dL Final    Calcium 06/19/2017 9.7  8.7 - 10.5 mg/dL Final    Anion Gap 06/19/2017 11  8 - 16 mmol/L Final    eGFR if African American 06/19/2017 >60.0  >60 mL/min/1.73 m^2 Final    eGFR  if non  06/19/2017 >60.0  >60 mL/min/1.73 m^2 Final    Comment: Calculation used to obtain the estimated glomerular filtration  rate (eGFR) is the CKD-EPI equation. Since race is unknown   in our information system, the eGFR values for   -American and Non--American patients are given   for each creatinine result.      Cholesterol 06/19/2017 266* 120 - 199 mg/dL Final    Comment: The National Cholesterol Education Program (NCEP) has set the  following guidelines (reference ranges) for Cholesterol:  Optimal.....................<200 mg/dL  Borderline High.............200-239 mg/dL  High........................> or = 240 mg/dL      Triglycerides 06/19/2017 105  30 - 150 mg/dL Final    Comment: The National Cholesterol Education Program (NCEP) has set the  following guidelines (reference values) for triglycerides:  Normal......................<150 mg/dL  Borderline High.............150-199 mg/dL  High........................200-499 mg/dL      HDL 06/19/2017 57  40 - 75 mg/dL Final    Comment: The National Cholesterol Education Program (NCEP) has set the  following guidelines (reference values) for HDL Cholesterol:  Low...............<40 mg/dL  Optimal...........>60 mg/dL      LDL Cholesterol 06/19/2017 188.0* 63.0 - 159.0 mg/dL Final    Comment: The National Cholesterol Education Program (NCEP) has set the  following guidelines (reference values) for LDL Cholesterol:  Optimal.......................<130 mg/dL  Borderline High...............130-159 mg/dL  High..........................160-189 mg/dL  Very High.....................>190 mg/dL      HDL/Chol Ratio 06/19/2017 21.4  20.0 - 50.0 % Final    Total Cholesterol/HDL Ratio 06/19/2017 4.7  2.0 - 5.0 Final    Non-HDL Cholesterol 06/19/2017 209  mg/dL Final    Comment: Risk category and Non-HDL cholesterol goals:  Coronary heart disease (CHD)or equivalent (10-year risk of CHD >20%):  Non-HDL cholesterol goal     <130 mg/dL  Two or more  CHD risk factors and 10-year risk of CHD <= 20%:  Non-HDL cholesterol goal     <160 mg/dL  0 to 1 CHD risk factor:  Non-HDL cholesterol goal     <190 mg/dL      Magnesium 06/19/2017 2.4  1.6 - 2.6 mg/dL Final           Vinnie Cotter

## 2018-07-17 ENCOUNTER — HOSPITAL ENCOUNTER (OUTPATIENT)
Dept: RADIOLOGY | Facility: HOSPITAL | Age: 58
Discharge: HOME | End: 2018-07-17
Attending: INTERNAL MEDICINE
Payer: MEDICARE

## 2018-07-17 ENCOUNTER — OFFICE VISIT (OUTPATIENT)
Dept: INTERNAL MEDICINE | Facility: CLINIC | Age: 58
End: 2018-07-17
Payer: MEDICARE

## 2018-07-17 VITALS
HEART RATE: 64 BPM | SYSTOLIC BLOOD PRESSURE: 120 MMHG | BODY MASS INDEX: 30.04 KG/M2 | DIASTOLIC BLOOD PRESSURE: 80 MMHG | HEIGHT: 69 IN | WEIGHT: 202.81 LBS

## 2018-07-17 DIAGNOSIS — M54.50 CHRONIC MIDLINE LOW BACK PAIN WITHOUT SCIATICA: ICD-10-CM

## 2018-07-17 DIAGNOSIS — G89.29 CHRONIC MIDLINE LOW BACK PAIN WITHOUT SCIATICA: ICD-10-CM

## 2018-07-17 DIAGNOSIS — E78.5 HYPERLIPIDEMIA, UNSPECIFIED HYPERLIPIDEMIA TYPE: ICD-10-CM

## 2018-07-17 DIAGNOSIS — G89.29 CHRONIC PAIN OF RIGHT KNEE: Primary | ICD-10-CM

## 2018-07-17 DIAGNOSIS — R20.0 NUMBNESS IN FEET: ICD-10-CM

## 2018-07-17 DIAGNOSIS — M25.561 CHRONIC PAIN OF RIGHT KNEE: Primary | ICD-10-CM

## 2018-07-17 DIAGNOSIS — I10 ESSENTIAL HYPERTENSION: ICD-10-CM

## 2018-07-17 DIAGNOSIS — M25.552 LEFT HIP PAIN: ICD-10-CM

## 2018-07-17 DIAGNOSIS — G40.309 GENERALIZED CONVULSIVE EPILEPSY WITHOUT INTRACTABLE EPILEPSY: ICD-10-CM

## 2018-07-17 DIAGNOSIS — E61.1 IRON DEFICIENCY: ICD-10-CM

## 2018-07-17 DIAGNOSIS — R42 LOSS OF EQUILIBRIUM: ICD-10-CM

## 2018-07-17 PROCEDURE — 72100 X-RAY EXAM L-S SPINE 2/3 VWS: CPT | Mod: 26,,, | Performed by: RADIOLOGY

## 2018-07-17 PROCEDURE — 3008F BODY MASS INDEX DOCD: CPT | Mod: CPTII,S$GLB,, | Performed by: INTERNAL MEDICINE

## 2018-07-17 PROCEDURE — 3079F DIAST BP 80-89 MM HG: CPT | Mod: CPTII,S$GLB,, | Performed by: INTERNAL MEDICINE

## 2018-07-17 PROCEDURE — 73502 X-RAY EXAM HIP UNI 2-3 VIEWS: CPT | Mod: 26,LT,, | Performed by: RADIOLOGY

## 2018-07-17 PROCEDURE — 99999 PR PBB SHADOW E&M-EST. PATIENT-LVL IV: CPT | Mod: PBBFAC,,, | Performed by: INTERNAL MEDICINE

## 2018-07-17 PROCEDURE — 99215 OFFICE O/P EST HI 40 MIN: CPT | Mod: S$GLB,,, | Performed by: INTERNAL MEDICINE

## 2018-07-17 PROCEDURE — 3074F SYST BP LT 130 MM HG: CPT | Mod: CPTII,S$GLB,, | Performed by: INTERNAL MEDICINE

## 2018-07-17 PROCEDURE — 72100 X-RAY EXAM L-S SPINE 2/3 VWS: CPT | Mod: TC

## 2018-07-17 PROCEDURE — 73502 X-RAY EXAM HIP UNI 2-3 VIEWS: CPT | Mod: TC,LT

## 2018-07-17 NOTE — PROGRESS NOTES
Subjective:       Patient ID: Roni Espinoza is a 57 y.o. male.    Chief Complaint: Knee Pain; Dizziness; and Hip Pain    Last visit with me 09/2017. Since then seen by Psychiatry. Upcoming Psychiatry appointment.    Knee has been a problem for over 1 year, didn't go to see Orthopedics in the past when referred. Even when sleeping. Some tenderness along the sides bilaterally, occasionally very bad swelling. Using a lot of Goody's powder.    L hip with numbness.    Sometimes when washing dishes feels off balance, feels like leg is giving out. Sometimes with throbbing pain. Feels like whole leg is swollen, and soreness in posterior knee. No particular relieving factors, but exercise and activity worsens swelling.    Dizziness comes on sometimes, with walking will occasionally stumble. Neurology reported everything was fine.    No change in bowel movement. Sometimes sharp pain and soreness in lumbar spine. Hard to get up from a sitting position.     Knee Pain      Dizziness:    Associated symptoms: hearing loss and headaches.no vomiting, no weakness, no palpitations and no chest pain.  Hip Pain        Review of Systems   Constitutional: Positive for activity change and unexpected weight change.   HENT: Positive for hearing loss and rhinorrhea. Negative for trouble swallowing.    Eyes: Positive for discharge and visual disturbance.   Respiratory: Negative for chest tightness and wheezing.    Cardiovascular: Negative for chest pain and palpitations.   Gastrointestinal: Positive for constipation. Negative for blood in stool, diarrhea and vomiting.   Endocrine: Negative for polydipsia and polyuria.   Genitourinary: Negative for difficulty urinating, hematuria and urgency.   Musculoskeletal: Positive for arthralgias, joint swelling and neck pain.   Neurological: Positive for dizziness and headaches. Negative for weakness.   Psychiatric/Behavioral: Positive for confusion and dysphoric mood.       Objective:      Physical Exam    Constitutional: No distress.   HENT:   Head: Atraumatic.   Right Ear: No tenderness.   Left Ear: Tympanic membrane normal. No tenderness.   Mouth/Throat: Oropharynx is clear and moist. No oropharyngeal exudate.   tympanic membrane obscured by cerumen on right    Eyes: Pupils are equal, round, and reactive to light. Right eye exhibits no discharge. Left eye exhibits no discharge.   Neck: Normal range of motion. No thyromegaly present.   Cardiovascular: Normal rate, regular rhythm and normal heart sounds.    Pulses:       Dorsalis pedis pulses are 2+ on the right side, and 2+ on the left side.        Posterior tibial pulses are 2+ on the right side, and 2+ on the left side.   Pulmonary/Chest: Effort normal and breath sounds normal. No stridor. He has no wheezes. He has no rales.   Abdominal: Soft. He exhibits no distension and no mass. There is tenderness (to moderate palpation) in the left lower quadrant. There is no guarding. No hernia.   Musculoskeletal: He exhibits no edema or tenderness.        Right knee: Normal.        Left knee: He exhibits normal range of motion and no erythema. No tenderness found.        Lumbar back: He exhibits no tenderness and no bony tenderness.        Right foot: There is normal range of motion and no deformity.        Left foot: There is normal range of motion and no deformity.   Feet:   Right Foot:   Protective Sensation: 5 sites tested. 2 sites sensed.   Skin Integrity: Negative for ulcer, blister, skin breakdown, erythema, warmth, callus or dry skin.   Left Foot:   Protective Sensation: 5 sites tested. 4 sites sensed.   Skin Integrity: Negative for ulcer, blister, skin breakdown, erythema, warmth, callus or dry skin.   Lymphadenopathy:     He has no cervical adenopathy.   Neurological: He is alert.   Skin: Skin is warm and dry. No rash noted.   Nursing note and vitals reviewed.      Vitals:    07/17/18 1354   BP: 120/80   BP Location: Right arm   Patient Position: Sitting   BP  "Method: Large (Manual)   Pulse: 64   Weight: 92 kg (202 lb 13.2 oz)   Height: 5' 9" (1.753 m)     Body mass index is 29.95 kg/m².    RESULTS: Reviewed labs from last 15 months    Assessment:       1. Chronic pain of right knee    2. Left hip pain    3. Loss of equilibrium    4. Numbness in feet    5. Chronic midline low back pain without sciatica    6. Hyperlipidemia, unspecified hyperlipidemia type    7. Essential hypertension    8. Iron deficiency    9. 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    Diagnoses and all orders for this visit:    Chronic pain of right knee:  Longstanding problem, last x-ray demonstrated osteoarthrosis.  Pain is getting worse.  Did not follow up with Orthopedics in the past.  I will refer back to their services for evaluation and further recommendations.  -     Ambulatory referral to Orthopedics    Left hip pain:  New problem.  Unclear if the pain is more due to issues with the hip or something intra-abdominal in the left lower quadrant.  At this time I am favoring abdominal pain, however patient is denying changes in bowel movements, but at the same time on review of systems notes that he is having constipation.  Check x-ray of the hip, if abnormal refer to Orthopedics for recommendations.  If pain worsens, evaluate for other causes of abdominal pain.  -     X-Ray Hip 2 or 3 views Left; Future    Loss of equilibrium:  New problem.  Possibly due to arthritis, possibly due to polyneuropathy.  Evaluate on EMG.    Numbness in feet:  New diagnosis.  Decreased sensation to monofilament bilaterally. Unclear if idiopathic polyneuropathy versus radiculopathy related.  Sent for EMG.  -     EMG W/ ULTRASOUND AND NERVE CONDUCTION TEST 2 Extremities; Future    Chronic midline low back pain without sciatica:  Chronic problem, localized in the low back along the spine, no tenderness to palpation.  X-ray and " refer to Orthopedics.  -     X-Ray Lumbar Spine Ap And Lateral; Future    Hyperlipidemia, unspecified hyperlipidemia type:  Recent dx, recommended follow up labs but pt did not obtain, send for labs today. If elevated start statin.  -     Lipid panel; Future    Essential hypertension:  Prior diagnosis, well controlled on current management. No changes at this time, will continue to monitor.   -     Comprehensive metabolic panel; Future  -     Magnesium; Future  -     CBC Without Differential; Future    Iron deficiency:  Prior diagnosis, well controlled on current management of iron supplement. No changes at this time, will continue to monitor.   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 Sx are currently well controlled with medications. No changes at this time, recommended continue follow up with Neurology and [facility].    Follow-up in about 3 months (around 10/17/2018) for follow up visit. Labs and xray today.  Shelton Cason MD  Internal Medicine    Portions of this note were completed using medical dictation software. Please excuse typographical or syntax errors that were missed on review.

## 2018-07-22 ENCOUNTER — TELEPHONE (OUTPATIENT)
Dept: INTERNAL MEDICINE | Facility: CLINIC | Age: 58
End: 2018-07-22

## 2018-07-22 NOTE — TELEPHONE ENCOUNTER
Please call the patient to notify that his cholesterol is slightly high. No need to make any changes right now, we will discuss at his next visit. his other labs are stable, no need for any changes.     Results have been released to [facility], but not viewed after 5 days. If the patient is not able to access [facility], please instruct them to call tech support services to reset the password at [phone]. If the patient would not like to be active on the [facility] Patient Portal, please let me know so I can deactivate access and we will use letters and phone calls to communicate instead.

## 2018-07-23 NOTE — TELEPHONE ENCOUNTER
Pt informed. He stats that his wife set up the account with [facility].. He will have her look at it.

## 2018-09-17 ENCOUNTER — PATIENT MESSAGE (OUTPATIENT)
Dept: INTERNAL MEDICINE | Facility: CLINIC | Age: 58
End: 2018-09-17

## 2018-09-17 ENCOUNTER — PROCEDURE VISIT (OUTPATIENT)
Dept: NEUROLOGY | Facility: CLINIC | Age: 58
End: 2018-09-17
Payer: MEDICARE

## 2018-09-17 DIAGNOSIS — R20.0 NUMBNESS IN FEET: ICD-10-CM

## 2018-09-17 PROCEDURE — 95886 MUSC TEST DONE W/N TEST COMP: CPT | Mod: PBBFAC | Performed by: PSYCHIATRY & NEUROLOGY

## 2018-09-17 PROCEDURE — 95886 MUSC TEST DONE W/N TEST COMP: CPT | Mod: 26,S$PBB,, | Performed by: PSYCHIATRY & NEUROLOGY

## 2018-09-17 PROCEDURE — 95910 NRV CNDJ TEST 7-8 STUDIES: CPT | Mod: PBBFAC | Performed by: PSYCHIATRY & NEUROLOGY

## 2018-09-17 PROCEDURE — 95910 NRV CNDJ TEST 7-8 STUDIES: CPT | Mod: 26,S$PBB,, | Performed by: PSYCHIATRY & NEUROLOGY

## 2018-09-24 ENCOUNTER — HOSPITAL ENCOUNTER (OUTPATIENT)
Dept: RADIOLOGY | Facility: HOSPITAL | Age: 58
Discharge: HOME | End: 2018-09-24
Attending: ORTHOPAEDIC SURGERY
Payer: MEDICARE

## 2018-09-24 ENCOUNTER — OFFICE VISIT (OUTPATIENT)
Dept: ORTHOPEDICS | Facility: CLINIC | Age: 58
End: 2018-09-24
Payer: MEDICARE

## 2018-09-24 VITALS
WEIGHT: 200.06 LBS | TEMPERATURE: 99 F | DIASTOLIC BLOOD PRESSURE: 84 MMHG | BODY MASS INDEX: 29.63 KG/M2 | HEIGHT: 69 IN | HEART RATE: 71 BPM | SYSTOLIC BLOOD PRESSURE: 119 MMHG

## 2018-09-24 DIAGNOSIS — M25.561 CHRONIC PAIN OF RIGHT KNEE: Primary | ICD-10-CM

## 2018-09-24 DIAGNOSIS — G89.29 CHRONIC PAIN OF RIGHT KNEE: Primary | ICD-10-CM

## 2018-09-24 DIAGNOSIS — M25.561 CHRONIC PAIN OF RIGHT KNEE: ICD-10-CM

## 2018-09-24 DIAGNOSIS — G89.29 CHRONIC PAIN OF RIGHT KNEE: ICD-10-CM

## 2018-09-24 DIAGNOSIS — M17.11 PRIMARY OSTEOARTHRITIS OF RIGHT KNEE: ICD-10-CM

## 2018-09-24 PROCEDURE — 73564 X-RAY EXAM KNEE 4 OR MORE: CPT | Mod: 26,50,, | Performed by: RADIOLOGY

## 2018-09-24 PROCEDURE — 73564 X-RAY EXAM KNEE 4 OR MORE: CPT | Mod: TC,50

## 2018-09-24 PROCEDURE — 3008F BODY MASS INDEX DOCD: CPT | Mod: CPTII,,, | Performed by: ORTHOPAEDIC SURGERY

## 2018-09-24 PROCEDURE — 20610 DRAIN/INJ JOINT/BURSA W/O US: CPT | Mod: PBBFAC | Performed by: ORTHOPAEDIC SURGERY

## 2018-09-24 PROCEDURE — 99213 OFFICE O/P EST LOW 20 MIN: CPT | Mod: PBBFAC,25 | Performed by: ORTHOPAEDIC SURGERY

## 2018-09-24 PROCEDURE — 3074F SYST BP LT 130 MM HG: CPT | Mod: CPTII,,, | Performed by: ORTHOPAEDIC SURGERY

## 2018-09-24 PROCEDURE — 99203 OFFICE O/P NEW LOW 30 MIN: CPT | Mod: S$PBB,25,, | Performed by: ORTHOPAEDIC SURGERY

## 2018-09-24 PROCEDURE — 3079F DIAST BP 80-89 MM HG: CPT | Mod: CPTII,,, | Performed by: ORTHOPAEDIC SURGERY

## 2018-09-24 PROCEDURE — 99999 PR PBB SHADOW E&M-EST. PATIENT-LVL III: CPT | Mod: PBBFAC,,, | Performed by: ORTHOPAEDIC SURGERY

## 2018-09-24 RX ORDER — TRIAMCINOLONE ACETONIDE 40 MG/ML
80 INJECTION, SUSPENSION INTRA-ARTICULAR; INTRAMUSCULAR
Status: DISCONTINUED | OUTPATIENT
Start: 2018-09-24 | End: 2018-09-24 | Stop reason: HOSPADM

## 2018-09-24 RX ADMIN — TRIAMCINOLONE ACETONIDE 80 MG: 40 INJECTION, SUSPENSION INTRA-ARTICULAR; INTRAMUSCULAR at 08:09

## 2018-09-24 NOTE — LETTER
September 24, 2018      Shelton Cason MD  [address]  [facility] [address]           [facility]  [address], [address]  [facility] [address]  Phone: [phone]          Patient: Roni Espinoza   MR Number: [id number]   YOB: 1960   Date of Visit: 9/24/2018       Dear Dr. Shelton Cason:    Thank you for referring Roni Espinoza to me for evaluation. Attached you will find relevant portions of my assessment and plan of care.    If you have questions, please do not hesitate to call me. I look forward to following Roni Espinoza along with you.    Sincerely,    Moy Christine MD    Enclosure  CC:  No Recipients    If you would like to receive this communication electronically, please contact [email or web address] or [phone] to request more information on [employer] access.    For providers and/or their staff who would like to refer a patient to [facility], please contact us through our one-stop-shop provider referral line, [facility] , at [phone].    If you feel you have received this communication in error or would no longer like to receive these types of communications, please e-mail [email or web address]

## 2018-09-24 NOTE — PROGRESS NOTES
CHIEF COMPLAINT: Right knee pain.                                                          HISTORY OF PRESENT ILLNESS:  The patient is a 57 y.o. male  who presents  for evaluation of his right knee pain.     Pain Duration: several years  Pain Quality: burning  Pain Context:worsening  Pain Timing: constant  Pain Location: global  Pain Severity: moderate  Associated Signs and Symptoms: swelling    He  presents for further treatment recommendations.    He denies radicular pain or low back pain.  He denies distal paresthesias, lower extremity edema, or calf pain concerning for vascular claudication.  He has no history of discreet prior trauma.                                                                                                                       PAST MEDICAL HISTORY:    Past Medical History:   Diagnosis Date    Allergy     Anxiety     Depression     Headache(784.0)     History of psychiatric hospitalization     Hx of psychiatric care     Hypertension     Psychiatric problem     Seizures     last seizure 2006    Therapy                                                                                                             PAST SURGICAL HISTORY:    Past Surgical History:   Procedure Laterality Date    BRAIN SURGERY  1990    AV malformation repair    COLONOSCOPY N/A 10/3/2016    Procedure: COLONOSCOPY;  Surgeon: Ehsan Odom MD;  Location: [facility] (4TH FLR);  Service: Endoscopy;  Laterality: N/A;  ok for me or wes to do colonoscopy per patient would like asap    COLONOSCOPY N/A 10/3/2016    Performed by Ehsan Odom MD at [facility] (4TH FLR)    ESOPHAGOGASTRODUODENOSCOPY (EGD) N/A 10/3/2016    Performed by Ehsan Odom MD at [facility] (4TH FLR)    ULTRASOUND-ENDOSCOPIC-UPPER N/A 10/21/2016    Performed by Zohaib Costa MD at [facility] (2ND FLR)                                                                                                               SOCIAL  HISTORY:  Reviewed per EPIC history for tobacco or alcohol use and he   is an active  57 y.o.  male                                                                             FAMILY MEDICAL HISTORY:  family history includes Alcohol abuse in his maternal uncle; Cancer in his brother, brother, and mother; Depression in his maternal uncle; Heart attack (age of onset: 62) in his brother; Heart disease in his daughter; No Known Problems in his son; Thyroid disease in his daughter.                                                                                                                                                          PHYSICAL EXAMINATION:                                                        GENERAL:  A well-developed, well-nourished 57 y.o. male who is alert and       oriented in no acute distress.      Gait: He  walks with a normal gait.                   EXTREMITIES:  Examination of lower extremities reveals there is no visible mass or deformity.    Left knee:  ROM 0-130    Ligamentously stable to varus/valgus stress.    Anterior and posterior drawers negative.    No pain over pes bursa.    No warmth    No erythema    Effusion No    Right knee:  ROM 5-120    Ligamentously stable to varus/valgus stress.    Anterior and posterior drawers negative.    No pain over pes bursa.    No warmth    No erythema    Effusion Yes    The skin over both lower extremities is normal and unremarkable.  He has a  painless range of motion of the hips and ankles bilaterally.   Sensation is intact in both lower extremities.    There are no motor deficits in the lower extremities bilaterally.   Pedal pulses are palpable distally bilaterally.    He has no calf tenderness to palpation nor edema.                                      He has imaging which was reviewed with the patient and shows severe osteoarthritis medial compartment right knee.                                                                                IMPRESSION: Osteoarthritis right knee.                             The conservative options including NSAIDs, activity modification, physical therapy, corticosteroid injection, and viscosupplimentation were discussed.  Mild baseline renal impairment.  Recommend injection.  After time out was performed and patient ID, side, and site were verified, the right knee was sterilly prepped in the standard fashion.  A 22-gauge needle was introduced into the joint from an infero-medial site without complication. The knee was then injected with 80 mg kenelog and 3 cc 1% plain lidocaine.  Sterile dressing was applied.  The patient was informed that they may resume activities as tolerated.  Elevation of glucose in diabetic patients was discussed.

## 2018-09-24 NOTE — PROCEDURES
Large Joint Aspiration/Injection: R knee  Date/Time: 9/24/2018 8:42 AM  Performed by: Moy Christine MD  Authorized by: Moy Christine MD     Consent Done?:  Yes (Verbal)  Indications:  Pain  Procedure site marked: Yes    Timeout: Prior to procedure the correct patient, procedure, and site was verified      Location:  Knee  Site:  R knee  Prep: Patient was prepped and draped in usual sterile fashion    Ultrasonic Guidance for needle placement: No  Needle size:  22 G  Approach:  Anteromedial  Medications:  80 mg triamcinolone acetonide 40 mg/mL

## 2018-10-21 ENCOUNTER — PATIENT MESSAGE (OUTPATIENT)
Dept: INTERNAL MEDICINE | Facility: CLINIC | Age: 58
End: 2018-10-21

## 2018-10-22 ENCOUNTER — PATIENT MESSAGE (OUTPATIENT)
Dept: INTERNAL MEDICINE | Facility: CLINIC | Age: 58
End: 2018-10-22

## 2018-10-25 ENCOUNTER — OFFICE VISIT (OUTPATIENT)
Dept: PSYCHIATRY | Facility: CLINIC | Age: 58
End: 2018-10-25
Payer: MEDICARE

## 2018-10-25 VITALS
HEART RATE: 82 BPM | WEIGHT: 199.31 LBS | HEIGHT: 69 IN | BODY MASS INDEX: 29.52 KG/M2 | DIASTOLIC BLOOD PRESSURE: 74 MMHG | SYSTOLIC BLOOD PRESSURE: 124 MMHG

## 2018-10-25 DIAGNOSIS — F39 MOOD DISORDER: ICD-10-CM

## 2018-10-25 PROCEDURE — 3074F SYST BP LT 130 MM HG: CPT | Mod: CPTII,,, | Performed by: PSYCHIATRY & NEUROLOGY

## 2018-10-25 PROCEDURE — 99999 PR PBB SHADOW E&M-EST. PATIENT-LVL II: CPT | Mod: PBBFAC,,, | Performed by: PSYCHIATRY & NEUROLOGY

## 2018-10-25 PROCEDURE — 3078F DIAST BP <80 MM HG: CPT | Mod: CPTII,,, | Performed by: PSYCHIATRY & NEUROLOGY

## 2018-10-25 PROCEDURE — 99213 OFFICE O/P EST LOW 20 MIN: CPT | Mod: S$PBB,,, | Performed by: PSYCHIATRY & NEUROLOGY

## 2018-10-25 PROCEDURE — 99212 OFFICE O/P EST SF 10 MIN: CPT | Mod: PBBFAC | Performed by: PSYCHIATRY & NEUROLOGY

## 2018-10-25 PROCEDURE — 3008F BODY MASS INDEX DOCD: CPT | Mod: CPTII,,, | Performed by: PSYCHIATRY & NEUROLOGY

## 2018-10-25 RX ORDER — RISPERIDONE 1 MG/1
1 TABLET ORAL NIGHTLY
Qty: 90 TABLET | Refills: 1 | Status: SHIPPED | OUTPATIENT
Start: 2018-10-25 | End: 2019-02-06 | Stop reason: SDUPTHER

## 2018-10-25 RX ORDER — SERTRALINE HYDROCHLORIDE 100 MG/1
100 TABLET, FILM COATED ORAL DAILY
Qty: 90 TABLET | Refills: 1 | Status: SHIPPED | OUTPATIENT
Start: 2018-10-25 | End: 2019-02-06 | Stop reason: SDUPTHER

## 2018-10-25 RX ORDER — LAMOTRIGINE 200 MG/1
TABLET ORAL
Qty: 90 TABLET | Refills: 1 | Status: SHIPPED | OUTPATIENT
Start: 2018-10-25 | End: 2019-02-06 | Stop reason: SDUPTHER

## 2018-10-25 NOTE — PROGRESS NOTES
ESTABLISHED OUTPATIENT VISIT   E/M LEVEL 3: [id number]    ENCOUNTER DATE: 10/25/2018  SITE: [facility]    HISTORY    CHIEF COMPLAINT   Roni Espinoza is a 57 y.o. male who presents for follow up of Cognitive d/o N.O.S., Mood d/o N.O.S., Psychosis N.O.S.  .    HPI     Reports confusion at times. Came to today's visit alone via bus.    No EPS noted on exam today.    Does work in the house.    Does not like being in crowds of people    States, that he hears knocking at times.    Denies recent seizure.    Psychiatric Review Of Systems - Is patient experiencing or having changes in:  sleep: recently interrupted.  appetite: no  Weight: no  energy/anergy: no  interest/pleasure/anhedonia: no  somatic symptoms: no  libido: no  anxiety/panic: no  guilty/hopelessness: no  concentration: no  S.I.B.s/risky behavior: no  Irritability: no  Racing thoughts: no  Impulsive behaviors: no  Paranoia:no  AVH:no    Recent alcohol: some wine last week  Recent drug: no    Medical ROS   Headaches at times     PAST MEDICAL, FAMILY AND SOCIAL HISTORY: The patient's past medical, family and social history have been reviewed and updated as appropriate within the electronic medical record - see encounter notes.    PSYCHOTROPIC MEDICATIONS   Lamictal 200 mg qam, Zoloft 100 mg qam, Risperdal 1 mg at bedtime, Folbic at bedtime    EXAMINATION    Vitals:    10/25/18 1313   BP: 115/75   Pulse: 92     Weight: 199 lb's    CONSTITUTIONAL  General Appearance: well nourished    MUSCULOSKELETAL  Muscle Strength and Tone: normal strength and tone  Abnormal Involuntary Movements: no abnormal movement noted  Gait and Station: normal gait    PSYCHIATRIC   Level of Consciousness: alert  Orientation: states, that today is Wednesday June 3rd 2018  Grooming: well groomed  Psychomotor Behavior: no restlessness/agitation  Speech: normal in rate, rhythm and volume  Language: normal vocabulary  Mood: steady  Affect: appropriate  Thought Process:  logical and goal directed  Associations: intact associations  Thought Content: no SI/HI  Memory: grossly intact  Attention: intact to content of interview  Fund of Knowledge: appears adequate  Insight: good  Judgement: good    MEDICAL DECISION MAKING    DIAGNOSES  Cognitive d/o N.O.S., Depressive d/o unspecified, Psychosis N.O.S.    PROBLEM LIST AND MANAGEMENT PLANS    - depressive d/o, psychosis: continue above meds  - rtc 3 months     Time with patient: 20 min    LABORATORY DATA  No visits with results within 3 Month(s) from this visit.   Latest known visit with results is:   Lab Visit on 07/17/2018   Component Date Value Ref Range Status    Sodium 07/17/2018 143  136 - 145 mmol/L Final    Potassium 07/17/2018 3.5  3.5 - 5.1 mmol/L Final    Chloride 07/17/2018 106  95 - 110 mmol/L Final    CO2 07/17/2018 28  23 - 29 mmol/L Final    Glucose 07/17/2018 73  70 - 110 mg/dL Final    BUN, Bld 07/17/2018 8  6 - 20 mg/dL Final    Creatinine 07/17/2018 1.4  0.5 - 1.4 mg/dL Final    Calcium 07/17/2018 9.4  8.7 - 10.5 mg/dL Final    Total Protein 07/17/2018 7.7  6.0 - 8.4 g/dL Final    Albumin 07/17/2018 4.1  3.5 - 5.2 g/dL Final    Total Bilirubin 07/17/2018 0.3  0.1 - 1.0 mg/dL Final    Comment: For infants and newborns, interpretation of results should be based  on gestational age, weight and in agreement with clinical  observations.  Premature Infant recommended reference ranges:  Up to 24 hours.............<8.0 mg/dL  Up to 48 hours............<12.0 mg/dL  3-5 days..................<15.0 mg/dL  6-29 days.................<15.0 mg/dL      Alkaline Phosphatase 07/17/2018 103  55 - 135 U/L Final    AST 07/17/2018 35  10 - 40 U/L Final    ALT 07/17/2018 26  10 - 44 U/L Final    Anion Gap 07/17/2018 9  8 - 16 mmol/L Final    eGFR if African American 07/17/2018 >60.0  >60 mL/min/1.73 m^2 Final    eGFR if non African American 07/17/2018 54.6* >60 mL/min/1.73 m^2 Final    Comment: Calculation used to obtain the  estimated glomerular filtration  rate (eGFR) is the CKD-EPI equation.       Magnesium 07/17/2018 2.3  1.6 - 2.6 mg/dL Final    Cholesterol 07/17/2018 210* 120 - 199 mg/dL Final    Comment: The National Cholesterol Education Program (NCEP) has set the  following guidelines (reference ranges) for Cholesterol:  Optimal.....................<200 mg/dL  Borderline High.............200-239 mg/dL  High........................> or = 240 mg/dL      Triglycerides 07/17/2018 157* 30 - 150 mg/dL Final    Comment: The National Cholesterol Education Program (NCEP) has set the  following guidelines (reference values) for triglycerides:  Normal......................<150 mg/dL  Borderline High.............150-199 mg/dL  High........................200-499 mg/dL      HDL 07/17/2018 54  40 - 75 mg/dL Final    Comment: The National Cholesterol Education Program (NCEP) has set the  following guidelines (reference values) for HDL Cholesterol:  Low...............<40 mg/dL  Optimal...........>60 mg/dL      LDL Cholesterol 07/17/2018 124.6  63.0 - 159.0 mg/dL Final    Comment: The National Cholesterol Education Program (NCEP) has set the  following guidelines (reference values) for LDL Cholesterol:  Optimal.......................<130 mg/dL  Borderline High...............130-159 mg/dL  High..........................160-189 mg/dL  Very High.....................>190 mg/dL      HDL/Chol Ratio 07/17/2018 25.7  20.0 - 50.0 % Final    Total Cholesterol/HDL Ratio 07/17/2018 3.9  2.0 - 5.0 Final    Non-HDL Cholesterol 07/17/2018 156  mg/dL Final    Comment: Risk category and Non-HDL cholesterol goals:  Coronary heart disease (CHD)or equivalent (10-year risk of CHD >20%):  Non-HDL cholesterol goal     <130 mg/dL  Two or more CHD risk factors and 10-year risk of CHD <= 20%:  Non-HDL cholesterol goal     <160 mg/dL  0 to 1 CHD risk factor:  Non-HDL cholesterol goal     <190 mg/dL      WBC 07/17/2018 6.92  3.90 - 12.70 K/uL Final    RBC  07/17/2018 4.43* 4.60 - 6.20 M/uL Final    Hemoglobin 07/17/2018 13.4* 14.0 - 18.0 g/dL Final    Hematocrit 07/17/2018 41.1  40.0 - 54.0 % Final    MCV 07/17/2018 93  82 - 98 fL Final    MCH 07/17/2018 30.2  27.0 - 31.0 pg Final    MCHC 07/17/2018 32.6  32.0 - 36.0 g/dL Final    RDW 07/17/2018 14.5  11.5 - 14.5 % Final    Platelets 07/17/2018 191  150 - 350 K/uL Final    MPV 07/17/2018 10.4  9.2 - 12.9 fL Final    Ferritin 07/17/2018 100  20.0 - 300.0 ng/mL Final    Iron 07/17/2018 90  45 - 160 ug/dL Final    Transferrin 07/17/2018 184* 200 - 375 mg/dL Final    TIBC 07/17/2018 272  250 - 450 ug/dL Final    Saturated Iron 07/17/2018 33  20 - 50 % Final           Vinnie Cotter

## 2018-10-30 ENCOUNTER — PATIENT MESSAGE (OUTPATIENT)
Dept: INTERNAL MEDICINE | Facility: CLINIC | Age: 58
End: 2018-10-30

## 2018-11-02 NOTE — TELEPHONE ENCOUNTER
Pt missed appointment 10/18 according to chart review. Pt appears to have been offered an appointment with nurse practitioner via message on 10/22, but it doesn't appear there was a response. Please call to clarify, if pt needs to be seen soon should go with NP.

## 2019-01-08 ENCOUNTER — PATIENT MESSAGE (OUTPATIENT)
Dept: INTERNAL MEDICINE | Facility: CLINIC | Age: 59
End: 2019-01-08

## 2019-01-12 ENCOUNTER — PATIENT MESSAGE (OUTPATIENT)
Dept: INTERNAL MEDICINE | Facility: CLINIC | Age: 59
End: 2019-01-12

## 2019-01-12 DIAGNOSIS — R41.3 MEMORY IMPAIRMENT: Primary | ICD-10-CM

## 2019-01-12 DIAGNOSIS — F39 MOOD DISORDER: ICD-10-CM

## 2019-01-14 RX ORDER — FOLIC ACID-PYRIDOXINE-CYANOCOBALAMIN TAB 2.5-25-2 MG 2.5-25-2 MG
TAB ORAL
Qty: 90 TABLET | Refills: 0 | Status: SHIPPED | OUTPATIENT
Start: 2019-01-14 | End: 2019-05-04 | Stop reason: SDUPTHER

## 2019-02-06 ENCOUNTER — OFFICE VISIT (OUTPATIENT)
Dept: PSYCHIATRY | Facility: CLINIC | Age: 59
End: 2019-02-06
Payer: MEDICARE

## 2019-02-06 VITALS
SYSTOLIC BLOOD PRESSURE: 108 MMHG | HEART RATE: 89 BPM | DIASTOLIC BLOOD PRESSURE: 62 MMHG | HEIGHT: 69 IN | BODY MASS INDEX: 30.35 KG/M2 | WEIGHT: 204.94 LBS

## 2019-02-06 DIAGNOSIS — F39 MOOD DISORDER: ICD-10-CM

## 2019-02-06 PROCEDURE — 99211 OFF/OP EST MAY X REQ PHY/QHP: CPT | Mod: HCNC,S$GLB,, | Performed by: PSYCHIATRY & NEUROLOGY

## 2019-02-06 PROCEDURE — 99211 PR OFFICE/OUTPT VISIT, EST, LEVL I: ICD-10-PCS | Mod: HCNC,S$GLB,, | Performed by: PSYCHIATRY & NEUROLOGY

## 2019-02-06 RX ORDER — RISPERIDONE 1 MG/1
1 TABLET ORAL NIGHTLY
Qty: 90 TABLET | Refills: 1 | Status: SHIPPED | OUTPATIENT
Start: 2019-02-06 | End: 2019-05-09 | Stop reason: SDUPTHER

## 2019-02-06 RX ORDER — LAMOTRIGINE 200 MG/1
TABLET ORAL
Qty: 90 TABLET | Refills: 1 | Status: SHIPPED | OUTPATIENT
Start: 2019-02-06 | End: 2019-05-09 | Stop reason: SDUPTHER

## 2019-02-06 RX ORDER — SERTRALINE HYDROCHLORIDE 100 MG/1
100 TABLET, FILM COATED ORAL DAILY
Qty: 90 TABLET | Refills: 1 | Status: SHIPPED | OUTPATIENT
Start: 2019-02-06 | End: 2019-05-09 | Stop reason: SDUPTHER

## 2019-02-06 NOTE — PROGRESS NOTES
ESTABLISHED OUTPATIENT VISIT   E/M LEVEL 3: [id number]    ENCOUNTER DATE: 2/6/2019  SITE: [facility] [address]    HISTORY    CHIEF COMPLAINT   Roni Espinoza is a 58 y.o. male who presents for follow up of Cognitive d/o N.O.S., Mood d/o N.O.S., Psychosis N.O.S.  .    HPI     Came to today's visit alone via bus.    No EPS noted on exam today.    Plans to go to library again.    Denies recent seizure.    Psychiatric Review Of Systems - Is patient experiencing or having changes in:  sleep: recently interrupted  appetite: no  Weight: no  energy/anergy: no  interest/pleasure/anhedonia: no  somatic symptoms: no  libido: no  anxiety/panic: no  guilty/hopelessness: no  concentration: no  S.I.B.s/risky behavior: no  Irritability: no  Racing thoughts: no  Impulsive behaviors: no  Paranoia:no  AVH:no    Recent alcohol: no  Recent drug: no    Medical ROS   Headaches at times     PAST MEDICAL, FAMILY AND SOCIAL HISTORY: The patient's past medical, family and social history have been reviewed and updated as appropriate within the electronic medical record - see encounter notes.    PSYCHOTROPIC MEDICATIONS   Lamictal 200 mg qam, Zoloft 100 mg qam, Risperdal 1 mg at bedtime, Folbic at bedtime    EXAMINATION    There were no vitals filed for this visit.  Pt. To have vitals and weight done after today's visit.    CONSTITUTIONAL  General Appearance: well nourished    MUSCULOSKELETAL  Muscle Strength and Tone: normal strength and tone  Abnormal Involuntary Movements: no abnormal movement noted  Gait and Station: normal gait    PSYCHIATRIC   Level of Consciousness: alert  Orientation: states, that today the 5th of August  Grooming: well groomed  Psychomotor Behavior: no restlessness/agitation  Speech: normal in rate, rhythm and volume  Language: normal vocabulary  Mood: steady  Affect: appropriate  Thought Process: logical and goal directed  Associations: intact associations  Thought Content: no SI/HI  Memory: grossly  intact  Attention: intact to content of interview  Fund of Knowledge: appears adequate  Insight: good  Judgement: good    MEDICAL DECISION MAKING    DIAGNOSES  Cognitive d/o N.O.S., Depressive d/o unspecified, Psychosis N.O.S.    PROBLEM LIST AND MANAGEMENT PLANS    - depressive d/o, psychosis: continue above meds  - rtc 3 months     Time with patient: 5 min    LABORATORY DATA  No visits with results within 3 Month(s) from this visit.   Latest known visit with results is:   Lab Visit on 07/17/2018   Component Date Value Ref Range Status    Sodium 07/17/2018 143  136 - 145 mmol/L Final    Potassium 07/17/2018 3.5  3.5 - 5.1 mmol/L Final    Chloride 07/17/2018 106  95 - 110 mmol/L Final    CO2 07/17/2018 28  23 - 29 mmol/L Final    Glucose 07/17/2018 73  70 - 110 mg/dL Final    BUN, Bld 07/17/2018 8  6 - 20 mg/dL Final    Creatinine 07/17/2018 1.4  0.5 - 1.4 mg/dL Final    Calcium 07/17/2018 9.4  8.7 - 10.5 mg/dL Final    Total Protein 07/17/2018 7.7  6.0 - 8.4 g/dL Final    Albumin 07/17/2018 4.1  3.5 - 5.2 g/dL Final    Total Bilirubin 07/17/2018 0.3  0.1 - 1.0 mg/dL Final    Comment: For infants and newborns, interpretation of results should be based  on gestational age, weight and in agreement with clinical  observations.  Premature Infant recommended reference ranges:  Up to 24 hours.............<8.0 mg/dL  Up to 48 hours............<12.0 mg/dL  3-5 days..................<15.0 mg/dL  6-29 days.................<15.0 mg/dL      Alkaline Phosphatase 07/17/2018 103  55 - 135 U/L Final    AST 07/17/2018 35  10 - 40 U/L Final    ALT 07/17/2018 26  10 - 44 U/L Final    Anion Gap 07/17/2018 9  8 - 16 mmol/L Final    eGFR if African American 07/17/2018 >60.0  >60 mL/min/1.73 m^2 Final    eGFR if non African American 07/17/2018 54.6* >60 mL/min/1.73 m^2 Final    Comment: Calculation used to obtain the estimated glomerular filtration  rate (eGFR) is the CKD-EPI equation.       Magnesium 07/17/2018 2.3  1.6  - 2.6 mg/dL Final    Cholesterol 07/17/2018 210* 120 - 199 mg/dL Final    Comment: The National Cholesterol Education Program (NCEP) has set the  following guidelines (reference ranges) for Cholesterol:  Optimal.....................<200 mg/dL  Borderline High.............200-239 mg/dL  High........................> or = 240 mg/dL      Triglycerides 07/17/2018 157* 30 - 150 mg/dL Final    Comment: The National Cholesterol Education Program (NCEP) has set the  following guidelines (reference values) for triglycerides:  Normal......................<150 mg/dL  Borderline High.............150-199 mg/dL  High........................200-499 mg/dL      HDL 07/17/2018 54  40 - 75 mg/dL Final    Comment: The National Cholesterol Education Program (NCEP) has set the  following guidelines (reference values) for HDL Cholesterol:  Low...............<40 mg/dL  Optimal...........>60 mg/dL      LDL Cholesterol 07/17/2018 124.6  63.0 - 159.0 mg/dL Final    Comment: The National Cholesterol Education Program (NCEP) has set the  following guidelines (reference values) for LDL Cholesterol:  Optimal.......................<130 mg/dL  Borderline High...............130-159 mg/dL  High..........................160-189 mg/dL  Very High.....................>190 mg/dL      HDL/Chol Ratio 07/17/2018 25.7  20.0 - 50.0 % Final    Total Cholesterol/HDL Ratio 07/17/2018 3.9  2.0 - 5.0 Final    Non-HDL Cholesterol 07/17/2018 156  mg/dL Final    Comment: Risk category and Non-HDL cholesterol goals:  Coronary heart disease (CHD)or equivalent (10-year risk of CHD >20%):  Non-HDL cholesterol goal     <130 mg/dL  Two or more CHD risk factors and 10-year risk of CHD <= 20%:  Non-HDL cholesterol goal     <160 mg/dL  0 to 1 CHD risk factor:  Non-HDL cholesterol goal     <190 mg/dL      WBC 07/17/2018 6.92  3.90 - 12.70 K/uL Final    RBC 07/17/2018 4.43* 4.60 - 6.20 M/uL Final    Hemoglobin 07/17/2018 13.4* 14.0 - 18.0 g/dL Final    Hematocrit  07/17/2018 41.1  40.0 - 54.0 % Final    MCV 07/17/2018 93  82 - 98 fL Final    MCH 07/17/2018 30.2  27.0 - 31.0 pg Final    MCHC 07/17/2018 32.6  32.0 - 36.0 g/dL Final    RDW 07/17/2018 14.5  11.5 - 14.5 % Final    Platelets 07/17/2018 191  150 - 350 K/uL Final    MPV 07/17/2018 10.4  9.2 - 12.9 fL Final    Ferritin 07/17/2018 100  20.0 - 300.0 ng/mL Final    Iron 07/17/2018 90  45 - 160 ug/dL Final    Transferrin 07/17/2018 184* 200 - 375 mg/dL Final    TIBC 07/17/2018 272  250 - 450 ug/dL Final    Saturated Iron 07/17/2018 33  20 - 50 % Final           Vinnie Cotter

## 2019-02-07 ENCOUNTER — TELEPHONE (OUTPATIENT)
Dept: INTERNAL MEDICINE | Facility: CLINIC | Age: 59
End: 2019-02-07

## 2019-02-08 NOTE — TELEPHONE ENCOUNTER
Spoke with pt to let pt know PCP will be out of the office and PCP nurse will call in the AM to help set up and future appt.    Sharon BANEGAS

## 2019-02-21 DIAGNOSIS — I10 ESSENTIAL HYPERTENSION: ICD-10-CM

## 2019-02-21 RX ORDER — HYDROCHLOROTHIAZIDE 25 MG/1
TABLET ORAL
Qty: 90 TABLET | Refills: 3 | Status: SHIPPED | OUTPATIENT
Start: 2019-02-21 | End: 2020-07-19 | Stop reason: ALTCHOICE

## 2019-04-11 ENCOUNTER — PATIENT MESSAGE (OUTPATIENT)
Dept: INTERNAL MEDICINE | Facility: CLINIC | Age: 59
End: 2019-04-11

## 2019-04-11 ENCOUNTER — LAB VISIT (OUTPATIENT)
Dept: LAB | Facility: HOSPITAL | Age: 59
End: 2019-04-11
Attending: INTERNAL MEDICINE
Payer: MEDICARE

## 2019-04-11 ENCOUNTER — OFFICE VISIT (OUTPATIENT)
Dept: INTERNAL MEDICINE | Facility: CLINIC | Age: 59
End: 2019-04-11
Payer: MEDICARE

## 2019-04-11 VITALS
HEART RATE: 70 BPM | BODY MASS INDEX: 29.92 KG/M2 | HEIGHT: 69 IN | WEIGHT: 202 LBS | DIASTOLIC BLOOD PRESSURE: 86 MMHG | SYSTOLIC BLOOD PRESSURE: 122 MMHG

## 2019-04-11 DIAGNOSIS — M17.11 PRIMARY OSTEOARTHRITIS OF RIGHT KNEE: ICD-10-CM

## 2019-04-11 DIAGNOSIS — I10 ESSENTIAL HYPERTENSION: ICD-10-CM

## 2019-04-11 DIAGNOSIS — D64.9 NORMOCYTIC ANEMIA: ICD-10-CM

## 2019-04-11 DIAGNOSIS — R10.31 RIGHT GROIN PAIN: ICD-10-CM

## 2019-04-11 DIAGNOSIS — G40.309 GENERALIZED CONVULSIVE EPILEPSY WITHOUT INTRACTABLE EPILEPSY: ICD-10-CM

## 2019-04-11 DIAGNOSIS — M17.11 PRIMARY OSTEOARTHRITIS OF RIGHT KNEE: Primary | ICD-10-CM

## 2019-04-11 DIAGNOSIS — R79.89 ELEVATED SERUM CREATININE: ICD-10-CM

## 2019-04-11 DIAGNOSIS — M25.561 CHRONIC PAIN OF RIGHT KNEE: Primary | ICD-10-CM

## 2019-04-11 DIAGNOSIS — G89.29 CHRONIC PAIN OF RIGHT KNEE: Primary | ICD-10-CM

## 2019-04-11 DIAGNOSIS — K25.9 GASTRIC ULCER, UNSPECIFIED CHRONICITY, UNSPECIFIED WHETHER GASTRIC ULCER HEMORRHAGE OR PERFORATION PRESENT: ICD-10-CM

## 2019-04-11 LAB
ALBUMIN SERPL BCP-MCNC: 4.3 G/DL (ref 3.5–5.2)
ALP SERPL-CCNC: 80 U/L (ref 55–135)
ALT SERPL W/O P-5'-P-CCNC: 21 U/L (ref 10–44)
ANION GAP SERPL CALC-SCNC: 7 MMOL/L (ref 8–16)
AST SERPL-CCNC: 27 U/L (ref 10–40)
BILIRUB SERPL-MCNC: 0.3 MG/DL (ref 0.1–1)
BUN SERPL-MCNC: 11 MG/DL (ref 6–20)
CALCIUM SERPL-MCNC: 9.6 MG/DL (ref 8.7–10.5)
CHLORIDE SERPL-SCNC: 104 MMOL/L (ref 95–110)
CO2 SERPL-SCNC: 28 MMOL/L (ref 23–29)
CREAT SERPL-MCNC: 1.2 MG/DL (ref 0.5–1.4)
ERYTHROCYTE [DISTWIDTH] IN BLOOD BY AUTOMATED COUNT: 14.3 % (ref 11.5–14.5)
EST. GFR  (AFRICAN AMERICAN): >60 ML/MIN/1.73 M^2
EST. GFR  (NON AFRICAN AMERICAN): >60 ML/MIN/1.73 M^2
GLUCOSE SERPL-MCNC: 94 MG/DL (ref 70–110)
HCT VFR BLD AUTO: 42.3 % (ref 40–54)
HGB BLD-MCNC: 13.9 G/DL (ref 14–18)
MCH RBC QN AUTO: 30.5 PG (ref 27–31)
MCHC RBC AUTO-ENTMCNC: 32.9 G/DL (ref 32–36)
MCV RBC AUTO: 93 FL (ref 82–98)
PLATELET # BLD AUTO: 191 K/UL (ref 150–350)
PMV BLD AUTO: 10.6 FL (ref 9.2–12.9)
POTASSIUM SERPL-SCNC: 3.4 MMOL/L (ref 3.5–5.1)
PROT SERPL-MCNC: 7.7 G/DL (ref 6–8.4)
RBC # BLD AUTO: 4.56 M/UL (ref 4.6–6.2)
SODIUM SERPL-SCNC: 139 MMOL/L (ref 136–145)
WBC # BLD AUTO: 7.95 K/UL (ref 3.9–12.7)

## 2019-04-11 PROCEDURE — 80053 COMPREHEN METABOLIC PANEL: CPT | Mod: HCNC

## 2019-04-11 PROCEDURE — 83735 ASSAY OF MAGNESIUM: CPT | Mod: HCNC

## 2019-04-11 PROCEDURE — 80061 LIPID PANEL: CPT | Mod: HCNC

## 2019-04-11 PROCEDURE — 3074F PR MOST RECENT SYSTOLIC BLOOD PRESSURE < 130 MM HG: ICD-10-PCS | Mod: HCNC,CPTII,S$GLB, | Performed by: INTERNAL MEDICINE

## 2019-04-11 PROCEDURE — 3079F PR MOST RECENT DIASTOLIC BLOOD PRESSURE 80-89 MM HG: ICD-10-PCS | Mod: HCNC,CPTII,S$GLB, | Performed by: INTERNAL MEDICINE

## 2019-04-11 PROCEDURE — 85027 COMPLETE CBC AUTOMATED: CPT | Mod: HCNC

## 2019-04-11 PROCEDURE — 99214 OFFICE O/P EST MOD 30 MIN: CPT | Mod: HCNC,S$GLB,, | Performed by: INTERNAL MEDICINE

## 2019-04-11 PROCEDURE — 3074F SYST BP LT 130 MM HG: CPT | Mod: HCNC,CPTII,S$GLB, | Performed by: INTERNAL MEDICINE

## 2019-04-11 PROCEDURE — 36415 COLL VENOUS BLD VENIPUNCTURE: CPT | Mod: HCNC

## 2019-04-11 PROCEDURE — 82728 ASSAY OF FERRITIN: CPT | Mod: HCNC

## 2019-04-11 PROCEDURE — 3008F PR BODY MASS INDEX (BMI) DOCUMENTED: ICD-10-PCS | Mod: HCNC,CPTII,S$GLB, | Performed by: INTERNAL MEDICINE

## 2019-04-11 PROCEDURE — 99999 PR PBB SHADOW E&M-EST. PATIENT-LVL IV: CPT | Mod: PBBFAC,HCNC,, | Performed by: INTERNAL MEDICINE

## 2019-04-11 PROCEDURE — 3008F BODY MASS INDEX DOCD: CPT | Mod: HCNC,CPTII,S$GLB, | Performed by: INTERNAL MEDICINE

## 2019-04-11 PROCEDURE — 3079F DIAST BP 80-89 MM HG: CPT | Mod: HCNC,CPTII,S$GLB, | Performed by: INTERNAL MEDICINE

## 2019-04-11 PROCEDURE — 99214 PR OFFICE/OUTPT VISIT, EST, LEVL IV, 30-39 MIN: ICD-10-PCS | Mod: HCNC,S$GLB,, | Performed by: INTERNAL MEDICINE

## 2019-04-11 PROCEDURE — 83540 ASSAY OF IRON: CPT | Mod: HCNC

## 2019-04-11 PROCEDURE — 99999 PR PBB SHADOW E&M-EST. PATIENT-LVL IV: ICD-10-PCS | Mod: PBBFAC,HCNC,, | Performed by: INTERNAL MEDICINE

## 2019-04-11 RX ORDER — AMOXICILLIN 500 MG/1
TABLET, FILM COATED ORAL
Refills: 0 | COMMUNITY
Start: 2019-04-03 | End: 2019-04-11

## 2019-04-11 RX ORDER — HYDROCODONE BITARTRATE AND ACETAMINOPHEN 5; 325 MG/1; MG/1
TABLET ORAL
Refills: 0 | COMMUNITY
Start: 2019-04-03 | End: 2019-04-11

## 2019-04-11 RX ORDER — IBUPROFEN 800 MG/1
TABLET ORAL
Refills: 0 | COMMUNITY
Start: 2019-04-03 | End: 2019-04-11

## 2019-04-11 RX ORDER — MELOXICAM 15 MG/1
15 TABLET ORAL DAILY PRN
Qty: 90 TABLET | Refills: 1 | Status: SHIPPED | OUTPATIENT
Start: 2019-04-11 | End: 2019-04-17 | Stop reason: SDUPTHER

## 2019-04-11 NOTE — PROGRESS NOTES
"Subjective:       Patient ID: Roni Espinoza is a 58 y.o. male.    Chief Complaint: Groin Pain and Joint Swelling (rt)    HPI    Patient is accompanied by his wife.    Last visit with me July 2018.  Since then seen by Orthopedics and Psychiatry.  Has upcoming psychiatry appointment.    Knee pain comes and goes, usually with swelling. In the back reports "deadness" in the back. Always the right knee. Injection from Orthopedics didn't help.    Pain in the groin Mon morning, improved.  The pain in the groin comes and goes over the last few months.  Describes a pulsating pain.  Has not felt any bulge in the area.  Left side is normal.  Rest of abdomen is normal.    Review of Systems   Constitutional: Positive for activity change. Negative for unexpected weight change.   HENT: Negative for hearing loss, rhinorrhea and trouble swallowing.    Eyes: Negative for discharge and visual disturbance.   Respiratory: Negative for chest tightness and wheezing.    Cardiovascular: Negative for chest pain and palpitations.   Gastrointestinal: Negative for blood in stool, constipation, diarrhea and vomiting.   Endocrine: Positive for polyuria. Negative for polydipsia.   Genitourinary: Negative for difficulty urinating, hematuria and urgency.   Musculoskeletal: Positive for arthralgias and joint swelling. Negative for neck pain.   Neurological: Positive for weakness and headaches.   Psychiatric/Behavioral: Positive for confusion and dysphoric mood.       Objective:      Physical Exam   Constitutional: No distress.   -American man whose Body mass index is 29.83 kg/m².    Abdominal: Soft. He exhibits no distension and no mass. There is tenderness (to moderate palpation in R inguinal canal). There is no rigidity and no guarding.   Musculoskeletal: He exhibits edema (mild pitting edema in RLE up to mid shin). He exhibits no tenderness.        Right knee: He exhibits effusion (very mild). He exhibits normal range of motion and no " "erythema. No tenderness found.   Nursing note and vitals reviewed.      Vitals:    04/11/19 1452   BP: 122/86   BP Location: Right arm   Patient Position: Sitting   BP Method: Large (Manual)   Pulse: 70   Weight: 91.6 kg (202 lb)   Height: 5' 9" (1.753 m)     Body mass index is 29.83 kg/m².    RESULTS: Reviewed labs from last 12 months    Assessment:       1. Chronic pain of right knee    2. Right groin pain    3. Normocytic anemia    4. Elevated serum creatinine    5. Essential hypertension    6. 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 Gastric ulcer, unspecified chronicity, unspecified whether gastric ulcer hemorrhage or perforation present        Plan:   Roni was seen today for groin pain and joint swelling.    Diagnoses and all orders for this visit:    Chronic pain of right knee:  Prior dx, persists.  Recheck kidney function, if stable okay to start meloxicam 15 mg daily p.r.n..  Counseled regarding potential for acute kidney injury and stomach upset.  Patient has a history of gastric ulcers as noted below, if we start NSAIDs we will monitor closely for any signs of bleeding.  Refer to Orthopedics for ongoing evaluation, consideration of alternative injections and possibly Physical therapy.  -     Ambulatory referral to Orthopedics    Right groin pain:  New diagnosis, problem has been occurring off and on for a few months.  Mostly wakes him up at night.  Use ultrasound to rule out hernia and femoral artery aneurysm.  If negative then likely tendinopathy, can consider physical therapy.  -     US Abdomen Limited; Future  -     Vascular Lab () US Arterial Leg Right; Future    Normocytic anemia:  Prior dx, stable. Recheck along with iron levels.  -     CBC Without Differential; Future  -     Ferritin; Future  -     Iron and TIBC; Future    Elevated serum creatinine:  Prior dx, mild elevated, recheck on labs. Pt drinks water throughout the day.  -     Comprehensive metabolic panel; " Future    Essential hypertension:  Prior diagnosis, stable, well controlled on current management. No changes at this time, will continue to monitor.   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 No changes at this time, will continue to monitor.     Gastric ulcer, unspecified chronicity, unspecified whether gastric ulcer hemorrhage or perforation present:  Prior diagnosis seen on endoscopy, non bleeding.  Patient is on PPI.  Discussed potential risks with meloxicam, if starting then use only as needed.    Follow up in about 3 months (around 7/4/2019) for NP Juanita Myers. Review labs from today with the patient.   Shelton Cason MD  Internal Medicine    Portions of this note were completed using medical dictation software. Please excuse typographical or syntax errors that were missed on review.

## 2019-04-12 ENCOUNTER — PATIENT MESSAGE (OUTPATIENT)
Dept: INTERNAL MEDICINE | Facility: CLINIC | Age: 59
End: 2019-04-12

## 2019-04-12 ENCOUNTER — TELEPHONE (OUTPATIENT)
Dept: INTERNAL MEDICINE | Facility: CLINIC | Age: 59
End: 2019-04-12

## 2019-04-12 DIAGNOSIS — E78.5 HYPERLIPIDEMIA, UNSPECIFIED HYPERLIPIDEMIA TYPE: Primary | ICD-10-CM

## 2019-04-12 LAB
CHOLEST SERPL-MCNC: 221 MG/DL (ref 120–199)
CHOLEST/HDLC SERPL: 4.5 {RATIO} (ref 2–5)
FERRITIN SERPL-MCNC: 82 NG/ML (ref 20–300)
HDLC SERPL-MCNC: 49 MG/DL (ref 40–75)
HDLC SERPL: 22.2 % (ref 20–50)
IRON SERPL-MCNC: 87 UG/DL (ref 45–160)
LDLC SERPL CALC-MCNC: 152.2 MG/DL (ref 63–159)
MAGNESIUM SERPL-MCNC: 2.1 MG/DL (ref 1.6–2.6)
NONHDLC SERPL-MCNC: 172 MG/DL
SATURATED IRON: 30 % (ref 20–50)
TOTAL IRON BINDING CAPACITY: 293 UG/DL (ref 250–450)
TRANSFERRIN SERPL-MCNC: 198 MG/DL (ref 200–375)
TRIGL SERPL-MCNC: 99 MG/DL (ref 30–150)

## 2019-04-12 RX ORDER — ROSUVASTATIN CALCIUM 20 MG/1
20 TABLET, COATED ORAL DAILY
Qty: 90 TABLET | Refills: 3 | Status: SHIPPED | OUTPATIENT
Start: 2019-04-12 | End: 2022-08-31 | Stop reason: SDUPTHER

## 2019-04-12 NOTE — TELEPHONE ENCOUNTER
Please schedule the patient for fasting lab work 1 week prior to his upcoming visit with Ms. Myers.  I have sent him a message through the patient portal.

## 2019-04-15 ENCOUNTER — CLINICAL SUPPORT (OUTPATIENT)
Dept: CARDIOLOGY | Facility: CLINIC | Age: 59
End: 2019-04-15
Attending: INTERNAL MEDICINE
Payer: MEDICARE

## 2019-04-15 DIAGNOSIS — R10.31 RIGHT GROIN PAIN: ICD-10-CM

## 2019-04-15 LAB
OHS CV RIGHT ABI LOWER EXTREMITY (NO CALC): 1.15
RIGHT ANT TIBIAL SYS PSV: 44 CM/S
RIGHT CFA PSV: 97 CM/S
RIGHT EXTERNAL ILLIAC PSV: 74 CM/S
RIGHT PERONEAL SYS PSV: 26 CM/S
RIGHT POPLITEAL PSV: 71 CM/S
RIGHT POST TIBIAL SYS PSV: 63 CM/S
RIGHT PROFUNDA SYS PSV: 66 CM/S
RIGHT SUPER FEMORAL DIST SYS PSV: 79 CM/S
RIGHT SUPER FEMORAL MID SYS PSV: 90 CM/S
RIGHT SUPER FEMORAL OSTIAL SYS PSV: 87 CM/S
RIGHT SUPER FEMORAL PROX SYS PSV: 94 CM/S
RIGHT TIB/PER TRUNK SYS PSV: 72 CM/S

## 2019-04-15 PROCEDURE — 93926 CV US DOPPLER ARTERIAL LEG RIGHT (CUPID ONLY): ICD-10-PCS | Mod: HCNC,RT,S$GLB, | Performed by: INTERNAL MEDICINE

## 2019-04-15 PROCEDURE — 93926 LOWER EXTREMITY STUDY: CPT | Mod: HCNC,RT,S$GLB, | Performed by: INTERNAL MEDICINE

## 2019-04-16 ENCOUNTER — HOSPITAL ENCOUNTER (OUTPATIENT)
Dept: RADIOLOGY | Facility: OTHER | Age: 59
Discharge: HOME | End: 2019-04-16
Attending: INTERNAL MEDICINE
Payer: MEDICARE

## 2019-04-16 DIAGNOSIS — R10.31 RIGHT GROIN PAIN: ICD-10-CM

## 2019-04-16 PROCEDURE — 76705 ECHO EXAM OF ABDOMEN: CPT | Mod: TC,HCNC

## 2019-04-16 PROCEDURE — 76705 US ABDOMEN LIMITED: ICD-10-PCS | Mod: 26,HCNC,, | Performed by: RADIOLOGY

## 2019-04-16 PROCEDURE — 76705 ECHO EXAM OF ABDOMEN: CPT | Mod: 26,HCNC,, | Performed by: RADIOLOGY

## 2019-04-17 ENCOUNTER — PATIENT MESSAGE (OUTPATIENT)
Dept: INTERNAL MEDICINE | Facility: CLINIC | Age: 59
End: 2019-04-17

## 2019-04-17 ENCOUNTER — TELEPHONE (OUTPATIENT)
Dept: INTERNAL MEDICINE | Facility: CLINIC | Age: 59
End: 2019-04-17

## 2019-04-17 RX ORDER — MELOXICAM 15 MG/1
15 TABLET ORAL DAILY PRN
Qty: 7 TABLET | Refills: 0 | Status: SHIPPED | OUTPATIENT
Start: 2019-04-17 | End: 2019-05-09 | Stop reason: SDUPTHER

## 2019-04-17 RX ORDER — MELOXICAM 15 MG/1
15 TABLET ORAL DAILY PRN
Qty: 90 TABLET | Refills: 1 | Status: SHIPPED | OUTPATIENT
Start: 2019-04-17 | End: 2019-04-17 | Stop reason: SDUPTHER

## 2019-04-17 NOTE — TELEPHONE ENCOUNTER
Please notify the pharmacy that I have spoken with the patient about this potential interaction.  Patient was counseled to notify the office if signs or symptoms of GI bleed developed.

## 2019-04-17 NOTE — TELEPHONE ENCOUNTER
----- Message from Sapphire Mcneal sent at 4/17/2019  9:34 AM CDT -----  Contact: [facility] [phone]  Prescription Clarification:     The pharmacy needs clarity on the following Rx:    meloxicam (MOBIC) 15 MG tablet   sertraline (ZOLOFT) 100 MG tablet     These drugs may cause increased risk of GI bleeding. Please confirm that you are aware of this interaction and ok to fill this combination.    Pharmacy: [facility] - [place], [place] - [address]    Thank You

## 2019-05-04 DIAGNOSIS — F39 MOOD DISORDER: ICD-10-CM

## 2019-05-06 ENCOUNTER — TELEPHONE (OUTPATIENT)
Dept: INTERNAL MEDICINE | Facility: CLINIC | Age: 59
End: 2019-05-06

## 2019-05-06 ENCOUNTER — OFFICE VISIT (OUTPATIENT)
Dept: NEUROSURGERY | Facility: CLINIC | Age: 59
End: 2019-05-06
Payer: MEDICARE

## 2019-05-06 ENCOUNTER — PATIENT MESSAGE (OUTPATIENT)
Dept: PSYCHIATRY | Facility: CLINIC | Age: 59
End: 2019-05-06

## 2019-05-06 VITALS
HEART RATE: 96 BPM | SYSTOLIC BLOOD PRESSURE: 120 MMHG | WEIGHT: 205 LBS | BODY MASS INDEX: 30.36 KG/M2 | TEMPERATURE: 99 F | HEIGHT: 69 IN | DIASTOLIC BLOOD PRESSURE: 85 MMHG

## 2019-05-06 DIAGNOSIS — G40.909 SEIZURE DISORDER: ICD-10-CM

## 2019-05-06 DIAGNOSIS — Q28.2 CEREBRAL ARTERIOVENOUS MALFORMATION: Primary | ICD-10-CM

## 2019-05-06 PROCEDURE — 99999 PR PBB SHADOW E&M-EST. PATIENT-LVL III: ICD-10-PCS | Mod: PBBFAC,HCNC,, | Performed by: NEUROLOGICAL SURGERY

## 2019-05-06 PROCEDURE — 3008F BODY MASS INDEX DOCD: CPT | Mod: HCNC,CPTII,S$GLB, | Performed by: NEUROLOGICAL SURGERY

## 2019-05-06 PROCEDURE — 3074F PR MOST RECENT SYSTOLIC BLOOD PRESSURE < 130 MM HG: ICD-10-PCS | Mod: HCNC,CPTII,S$GLB, | Performed by: NEUROLOGICAL SURGERY

## 2019-05-06 PROCEDURE — 99999 PR PBB SHADOW E&M-EST. PATIENT-LVL III: CPT | Mod: PBBFAC,HCNC,, | Performed by: NEUROLOGICAL SURGERY

## 2019-05-06 PROCEDURE — 99213 OFFICE O/P EST LOW 20 MIN: CPT | Mod: HCNC,S$GLB,, | Performed by: NEUROLOGICAL SURGERY

## 2019-05-06 PROCEDURE — 3079F PR MOST RECENT DIASTOLIC BLOOD PRESSURE 80-89 MM HG: ICD-10-PCS | Mod: HCNC,CPTII,S$GLB, | Performed by: NEUROLOGICAL SURGERY

## 2019-05-06 PROCEDURE — 3008F PR BODY MASS INDEX (BMI) DOCUMENTED: ICD-10-PCS | Mod: HCNC,CPTII,S$GLB, | Performed by: NEUROLOGICAL SURGERY

## 2019-05-06 PROCEDURE — 99213 PR OFFICE/OUTPT VISIT, EST, LEVL III, 20-29 MIN: ICD-10-PCS | Mod: HCNC,S$GLB,, | Performed by: NEUROLOGICAL SURGERY

## 2019-05-06 PROCEDURE — 3074F SYST BP LT 130 MM HG: CPT | Mod: HCNC,CPTII,S$GLB, | Performed by: NEUROLOGICAL SURGERY

## 2019-05-06 PROCEDURE — 3079F DIAST BP 80-89 MM HG: CPT | Mod: HCNC,CPTII,S$GLB, | Performed by: NEUROLOGICAL SURGERY

## 2019-05-06 RX ORDER — FOLIC ACID-PYRIDOXINE-CYANOCOBALAMIN TAB 2.5-25-2 MG 2.5-25-2 MG
TAB ORAL
Qty: 90 TABLET | Refills: 0 | Status: SHIPPED | OUTPATIENT
Start: 2019-05-06 | End: 2019-05-09 | Stop reason: SDUPTHER

## 2019-05-06 NOTE — TELEPHONE ENCOUNTER
It was for the Mobic   He only gave him 7 tablets   However he needed to follow up with ortho to get more

## 2019-05-06 NOTE — TELEPHONE ENCOUNTER
----- Message from Lorna Reyes sent at 5/6/2019 10:47 AM CDT -----  Contact: Patient [phone]  Patient needs to discuss medications.     Please call and advise  Thank you

## 2019-05-06 NOTE — PROGRESS NOTES
This office note has been dictated.  Roni Espinoza was seen in neurosurgical followup at the office this afternoon.    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  He has suffered with seizures and remains on   Lamictal for seizure management.  I last saw him on 07/14/16.  He says he has   had no seizures since that time.  He also had psychiatric difficulties with   depression and psychosis.  He remains under psychiatric outpatient treatment and   seems to be doing reasonably well with this.  He has no specific complaints   today.  He denies headaches.  He feels his vision is stable.  He has no   complaints of focal weakness.  He has had right knee pain and this has limited   his walking somewhat.  He apparently had an injection in the knee, which has   helped.  He worked at [employer] in the [employer] for many   years.    On examination today, he remains alert and cooperative.  Memory is only fair.    He shows good extraocular movements and equal pupils.  Fundi show clear disk   margins, although seen with some difficulty.  He has normal facial movement and   sensation.  The tongue protrudes in midline.  He shows good strength in   extremities.  He tends to favor the right leg, but also there is some primary   difficulty with tandem gait.    I have no new imaging studies today.  His last MRI study of 2016 showed no   changes from his postop study of 2005.    I have no new recommendations for him.  I will have him return in about one year   for routine followup.  He should call if he has any new neurological issues   before that time.      NEVILLE/ALEM  dd: 05/06/2019 16:47:50 (CDT)  td: 05/07/2019 13:12:42 (CDT)  Doc ID   #[id number]  Job ID #[id number]    CC: Roni Espinoza

## 2019-05-09 ENCOUNTER — PATIENT MESSAGE (OUTPATIENT)
Dept: PSYCHIATRY | Facility: CLINIC | Age: 59
End: 2019-05-09

## 2019-05-09 ENCOUNTER — OFFICE VISIT (OUTPATIENT)
Dept: PSYCHIATRY | Facility: CLINIC | Age: 59
End: 2019-05-09
Payer: MEDICARE

## 2019-05-09 ENCOUNTER — TELEPHONE (OUTPATIENT)
Dept: INTERNAL MEDICINE | Facility: CLINIC | Age: 59
End: 2019-05-09

## 2019-05-09 VITALS
HEIGHT: 69 IN | SYSTOLIC BLOOD PRESSURE: 124 MMHG | HEART RATE: 94 BPM | WEIGHT: 203.38 LBS | BODY MASS INDEX: 30.12 KG/M2 | DIASTOLIC BLOOD PRESSURE: 80 MMHG

## 2019-05-09 DIAGNOSIS — M25.561 CHRONIC PAIN OF RIGHT KNEE: ICD-10-CM

## 2019-05-09 DIAGNOSIS — K92.2 GASTROINTESTINAL HEMORRHAGE, UNSPECIFIED GASTROINTESTINAL HEMORRHAGE TYPE: ICD-10-CM

## 2019-05-09 DIAGNOSIS — G89.29 CHRONIC PAIN OF RIGHT KNEE: ICD-10-CM

## 2019-05-09 DIAGNOSIS — F39 MOOD DISORDER: ICD-10-CM

## 2019-05-09 DIAGNOSIS — Z87.19 HISTORY OF GI BLEED: ICD-10-CM

## 2019-05-09 DIAGNOSIS — Q27.30 AVM (ARTERIOVENOUS MALFORMATION): Primary | ICD-10-CM

## 2019-05-09 DIAGNOSIS — F29 PSYCHOSIS, UNSPECIFIED PSYCHOSIS TYPE: Primary | ICD-10-CM

## 2019-05-09 PROCEDURE — 3074F SYST BP LT 130 MM HG: CPT | Mod: HCNC,CPTII,S$GLB, | Performed by: PSYCHIATRY & NEUROLOGY

## 2019-05-09 PROCEDURE — 99213 OFFICE O/P EST LOW 20 MIN: CPT | Mod: HCNC,S$GLB,, | Performed by: PSYCHIATRY & NEUROLOGY

## 2019-05-09 PROCEDURE — 3008F PR BODY MASS INDEX (BMI) DOCUMENTED: ICD-10-PCS | Mod: HCNC,CPTII,S$GLB, | Performed by: PSYCHIATRY & NEUROLOGY

## 2019-05-09 PROCEDURE — 3008F BODY MASS INDEX DOCD: CPT | Mod: HCNC,CPTII,S$GLB, | Performed by: PSYCHIATRY & NEUROLOGY

## 2019-05-09 PROCEDURE — 3074F PR MOST RECENT SYSTOLIC BLOOD PRESSURE < 130 MM HG: ICD-10-PCS | Mod: HCNC,CPTII,S$GLB, | Performed by: PSYCHIATRY & NEUROLOGY

## 2019-05-09 PROCEDURE — 3079F DIAST BP 80-89 MM HG: CPT | Mod: HCNC,CPTII,S$GLB, | Performed by: PSYCHIATRY & NEUROLOGY

## 2019-05-09 PROCEDURE — 99999 PR PBB SHADOW E&M-EST. PATIENT-LVL II: CPT | Mod: PBBFAC,HCNC,, | Performed by: PSYCHIATRY & NEUROLOGY

## 2019-05-09 PROCEDURE — 3079F PR MOST RECENT DIASTOLIC BLOOD PRESSURE 80-89 MM HG: ICD-10-PCS | Mod: HCNC,CPTII,S$GLB, | Performed by: PSYCHIATRY & NEUROLOGY

## 2019-05-09 PROCEDURE — 99999 PR PBB SHADOW E&M-EST. PATIENT-LVL II: ICD-10-PCS | Mod: PBBFAC,HCNC,, | Performed by: PSYCHIATRY & NEUROLOGY

## 2019-05-09 PROCEDURE — 99213 PR OFFICE/OUTPT VISIT, EST, LEVL III, 20-29 MIN: ICD-10-PCS | Mod: HCNC,S$GLB,, | Performed by: PSYCHIATRY & NEUROLOGY

## 2019-05-09 RX ORDER — SERTRALINE HYDROCHLORIDE 100 MG/1
100 TABLET, FILM COATED ORAL DAILY
Qty: 90 TABLET | Refills: 1 | Status: SHIPPED | OUTPATIENT
Start: 2019-05-09 | End: 2019-08-14 | Stop reason: SDUPTHER

## 2019-05-09 RX ORDER — SERTRALINE HYDROCHLORIDE 100 MG/1
100 TABLET, FILM COATED ORAL DAILY
Qty: 90 TABLET | Refills: 1 | Status: SHIPPED | OUTPATIENT
Start: 2019-05-09 | End: 2019-05-09 | Stop reason: SDUPTHER

## 2019-05-09 RX ORDER — LAMOTRIGINE 200 MG/1
TABLET ORAL
Qty: 90 TABLET | Refills: 1 | Status: SHIPPED | OUTPATIENT
Start: 2019-05-09 | End: 2019-05-09 | Stop reason: SDUPTHER

## 2019-05-09 RX ORDER — RISPERIDONE 1 MG/1
1 TABLET ORAL NIGHTLY
Qty: 90 TABLET | Refills: 1 | Status: SHIPPED | OUTPATIENT
Start: 2019-05-09 | End: 2019-08-14 | Stop reason: SDUPTHER

## 2019-05-09 RX ORDER — LAMOTRIGINE 200 MG/1
TABLET ORAL
Qty: 90 TABLET | Refills: 1 | Status: SHIPPED | OUTPATIENT
Start: 2019-05-09 | End: 2019-08-14 | Stop reason: SDUPTHER

## 2019-05-09 RX ORDER — MELOXICAM 15 MG/1
15 TABLET ORAL DAILY PRN
Qty: 30 TABLET | Refills: 2 | Status: SHIPPED | OUTPATIENT
Start: 2019-05-09 | End: 2020-07-20

## 2019-05-09 RX ORDER — RISPERIDONE 1 MG/1
1 TABLET ORAL NIGHTLY
Qty: 90 TABLET | Refills: 1 | Status: SHIPPED | OUTPATIENT
Start: 2019-05-09 | End: 2019-05-09 | Stop reason: SDUPTHER

## 2019-05-09 NOTE — TELEPHONE ENCOUNTER
I'm okay with giving longer course of Mobic. We have discussed risks of bleeding. Please link the labs ordered today to his upcoming appointment in July.

## 2019-05-09 NOTE — PROGRESS NOTES
ESTABLISHED OUTPATIENT VISIT   E/M LEVEL 3: [id number]    ENCOUNTER DATE: 5/9/2019  SITE: [facility], [address]    HISTORY    CHIEF COMPLAINT   Roni Espinoza is a 58 y.o. male who presents for follow up of Cognitive d/o N.O.S., Mood d/o N.O.S., Psychosis N.O.S.  .    HPI     Came to today's visit alone via bus.    No EPS noted on exam today.    Plans to visit stepdaughter in [place].    Denies recent seizure.    States, that he recently looked into the mirror and he was not there[it looked like someone else]; this scared the pt.    Psychiatric Review Of Systems - Is patient experiencing or having changes in:  sleep: about 6 hours, at times sleeps during the day  appetite: no  Weight: no  energy/anergy: no  interest/pleasure/anhedonia: no  somatic symptoms: no  libido: no  anxiety/panic: no  guilty/hopelessness: no  concentration: no  S.I.B.s/risky behavior: no  Irritability: no  Racing thoughts: no  Impulsive behaviors: no  Paranoia:no  AVH:no    Recent alcohol: no  Recent drug: no    Medical ROS   Right knee pain     PAST MEDICAL, FAMILY AND SOCIAL HISTORY: The patient's past medical, family and social history have been reviewed and updated as appropriate within the electronic medical record - see encounter notes.    PSYCHOTROPIC MEDICATIONS   Lamictal 200 mg qam, Zoloft 100 mg qam, Risperdal 1 mg at bedtime, Folbic at bedtime    EXAMINATION    Vitals:    05/09/19 1311   BP: 124/80   Pulse: 94     Pt. To have vitals and weight done after today's visit.    CONSTITUTIONAL  General Appearance: well nourished    MUSCULOSKELETAL  Muscle Strength and Tone: normal strength and tone  Abnormal Involuntary Movements: no abnormal movement noted  Gait and Station: normal gait    PSYCHIATRIC   Level of Consciousness: alert  Orientation: states, that today is Thursday May 10th  Grooming: well groomed  Psychomotor Behavior: no restlessness/agitation  Speech: normal in rate, rhythm and volume  Language: normal  vocabulary  Mood: steady  Affect: appropriate  Thought Process: logical and goal directed  Associations: intact associations  Thought Content: no SI/HI  Memory: grossly intact  Attention: intact to content of interview  Fund of Knowledge: appears adequate  Insight: good  Judgement: good    MEDICAL DECISION MAKING    DIAGNOSES  Cognitive d/o N.O.S., Depressive d/o unspecified, Psychosis N.O.S.    PROBLEM LIST AND MANAGEMENT PLANS    - depressive d/o, psychosis: continue above meds  - rtc 3 months     Time with patient: 20 min    LABORATORY DATA  Clinical Support on 04/15/2019   Component Date Value Ref Range Status    Right popliteal PSV 04/15/2019 71  cm/s Final    Right CFA PSV 04/15/2019 97  cm/s Final    Right profunda sys PSV 04/15/2019 66  cm/s Final    Right super femoral prox sys PSV 04/15/2019 94  cm/s Final    Right super femoral mid sys PSV 04/15/2019 90  cm/s Final    Right super femoral dist sys PSV 04/15/2019 79  cm/s Final    Right ant tibeal sys PSV 04/15/2019 44  cm/s Final    Right tib/per trunk sys PSV 04/15/2019 72  cm/s Final    Right post tibial sys PSV 04/15/2019 63  cm/s Final    Right peroneal sys PSV 04/15/2019 26  cm/s Final    Right MATT 04/15/2019 1.15   Final    Right External Illiac PSV 04/15/2019 74  cm/s Final    Right super femoral ostial sys PSV 04/15/2019 87  cm/s Final   Lab Visit on 04/11/2019   Component Date Value Ref Range Status    WBC 04/11/2019 7.95  3.90 - 12.70 K/uL Final    RBC 04/11/2019 4.56* 4.60 - 6.20 M/uL Final    Hemoglobin 04/11/2019 13.9* 14.0 - 18.0 g/dL Final    Hematocrit 04/11/2019 42.3  40.0 - 54.0 % Final    Mean Corpuscular Volume 04/11/2019 93  82 - 98 fL Final    Mean Corpuscular Hemoglobin 04/11/2019 30.5  27.0 - 31.0 pg Final    Mean Corpuscular Hemoglobin Conc 04/11/2019 32.9  32.0 - 36.0 g/dL Final    RDW 04/11/2019 14.3  11.5 - 14.5 % Final    Platelets 04/11/2019 191  150 - 350 K/uL Final    MPV 04/11/2019 10.6  9.2 - 12.9 fL  Final    Ferritin 04/11/2019 82  20.0 - 300.0 ng/mL Final    Iron 04/11/2019 87  45 - 160 ug/dL Final    Transferrin 04/11/2019 198* 200 - 375 mg/dL Final    TIBC 04/11/2019 293  250 - 450 ug/dL Final    Saturated Iron 04/11/2019 30  20 - 50 % Final    Sodium 04/11/2019 139  136 - 145 mmol/L Final    Potassium 04/11/2019 3.4* 3.5 - 5.1 mmol/L Final    Chloride 04/11/2019 104  95 - 110 mmol/L Final    CO2 04/11/2019 28  23 - 29 mmol/L Final    Glucose 04/11/2019 94  70 - 110 mg/dL Final    BUN, Bld 04/11/2019 11  6 - 20 mg/dL Final    Creatinine 04/11/2019 1.2  0.5 - 1.4 mg/dL Final    Calcium 04/11/2019 9.6  8.7 - 10.5 mg/dL Final    Total Protein 04/11/2019 7.7  6.0 - 8.4 g/dL Final    Albumin 04/11/2019 4.3  3.5 - 5.2 g/dL Final    Total Bilirubin 04/11/2019 0.3  0.1 - 1.0 mg/dL Final    Comment: For infants and newborns, interpretation of results should be based  on gestational age, weight and in agreement with clinical  observations.  Premature Infant recommended reference ranges:  Up to 24 hours.............<8.0 mg/dL  Up to 48 hours............<12.0 mg/dL  3-5 days..................<15.0 mg/dL  6-29 days.................<15.0 mg/dL      Alkaline Phosphatase 04/11/2019 80  55 - 135 U/L Final    AST 04/11/2019 27  10 - 40 U/L Final    ALT 04/11/2019 21  10 - 44 U/L Final    Anion Gap 04/11/2019 7* 8 - 16 mmol/L Final    eGFR if African American 04/11/2019 >60  >60 mL/min/1.73 m^2 Final    eGFR if non African American 04/11/2019 >60  >60 mL/min/1.73 m^2 Final    Comment: Calculation used to obtain the estimated glomerular filtration  rate (eGFR) is the CKD-EPI equation.       Magnesium 04/11/2019 2.1  1.6 - 2.6 mg/dL Final    Cholesterol 04/11/2019 221* 120 - 199 mg/dL Final    Comment: The National Cholesterol Education Program (NCEP) has set the  following guidelines (reference ranges) for Cholesterol:  Optimal.....................<200 mg/dL  Borderline High.............200-239  mg/dL  High........................> or = 240 mg/dL      Triglycerides 04/11/2019 99  30 - 150 mg/dL Final    Comment: The National Cholesterol Education Program (NCEP) has set the  following guidelines (reference values) for triglycerides:  Normal......................<150 mg/dL  Borderline High.............150-199 mg/dL  High........................200-499 mg/dL      HDL 04/11/2019 49  40 - 75 mg/dL Final    Comment: The National Cholesterol Education Program (NCEP) has set the  following guidelines (reference values) for HDL Cholesterol:  Low...............<40 mg/dL  Optimal...........>60 mg/dL      LDL Cholesterol 04/11/2019 152.2  63.0 - 159.0 mg/dL Final    Comment: The National Cholesterol Education Program (NCEP) has set the  following guidelines (reference values) for LDL Cholesterol:  Optimal.......................<130 mg/dL  Borderline High...............130-159 mg/dL  High..........................160-189 mg/dL  Very High.....................>190 mg/dL      Hdl/Cholesterol Ratio 04/11/2019 22.2  20.0 - 50.0 % Final    Total Cholesterol/HDL Ratio 04/11/2019 4.5  2.0 - 5.0 Final    Non-HDL Cholesterol 04/11/2019 172  mg/dL Final    Comment: Risk category and Non-HDL cholesterol goals:  Coronary heart disease (CHD)or equivalent (10-year risk of CHD >20%):  Non-HDL cholesterol goal     <130 mg/dL  Two or more CHD risk factors and 10-year risk of CHD <= 20%:  Non-HDL cholesterol goal     <160 mg/dL  0 to 1 CHD risk factor:  Non-HDL cholesterol goal     <190 mg/dL             Vinnie Cotter

## 2019-05-09 NOTE — TELEPHONE ENCOUNTER
----- Message from Agueda Chiu sent at 5/9/2019  9:07 AM CDT -----  Contact: pharmacy/devaughn/[phone] ext [phone]  Pharmacy called in regards to a drug interaction for     meloxicam (MOBIC) 15 MG tablet 7 tablet 0 4/17/2019  No Take 1 tablet (15 mg total) by mouth daily as needed (Knee pain).    And he is getting     sertraline (ZOLOFT) 100 MG tablet 90 tablet 1 2/6/2019  No  Take 1 tablet (100 mg total) by mouth once daily.     From another dr and he can get a stomach bleed and the pharmacy wanted to know  if it is ok for the pt to take both of these med's. This is the  4th attempt to get an answerer. Pt does not have the Rx for meloxicam (MOBIC) 15 MG tablet until they get an answerer from the dr. [facility]  Please advise

## 2019-05-14 ENCOUNTER — OUTPATIENT CASE MANAGEMENT (OUTPATIENT)
Dept: ADMINISTRATIVE | Facility: OTHER | Age: 59
End: 2019-05-14

## 2019-05-14 NOTE — PROGRESS NOTES
This LMSW received a referral on the above patient.   Reason for referral: Low risk, financial assistance with transportation to and from the library   Name of the community resource that was provided: [employer], [employer] Membership Program, Senior Resource Guide  Resource given to: Patient via [place] mail    LMSW completed assessment with Pt. Pt reports living with family and states he is independent with ADLs. Pt does not use any assistive devices to ambulate. Pt reports he sometimes has trouble affording medications however he currently has all his medications. Pt does not qualify for Medicaid benefits because of his reported monthly income. Pt reports he uses [employer] bus for transportation. Pt reports he has not accessed [employer] benefit and LMSW offered to provide patient with information to verify this benefit. Pt reports he enjoys going to the library however he can no longer afford cost of travel every day. LMSW discussed with Pt options for getting to library and suggested other activities for Pt engage in from home or in the community. Pt offered senior resource guide for additional information about community activities. Pt requested resources to be mailed to his home address on file. No additional needs identified by Pt. Case closed. Referral source notified.

## 2019-07-16 ENCOUNTER — PATIENT MESSAGE (OUTPATIENT)
Dept: PSYCHIATRY | Facility: CLINIC | Age: 59
End: 2019-07-16

## 2019-08-01 DIAGNOSIS — F39 MOOD DISORDER: ICD-10-CM

## 2019-08-02 RX ORDER — FOLIC ACID-PYRIDOXINE-CYANOCOBALAMIN TAB 2.5-25-2 MG 2.5-25-2 MG
TAB ORAL
Qty: 90 TABLET | Refills: 0 | Status: SHIPPED | OUTPATIENT
Start: 2019-08-02 | End: 2020-07-18 | Stop reason: SDUPTHER

## 2019-08-14 ENCOUNTER — OFFICE VISIT (OUTPATIENT)
Dept: PSYCHIATRY | Facility: CLINIC | Age: 59
End: 2019-08-14
Payer: MEDICARE

## 2019-08-14 ENCOUNTER — PATIENT MESSAGE (OUTPATIENT)
Dept: PSYCHIATRY | Facility: CLINIC | Age: 59
End: 2019-08-14

## 2019-08-14 VITALS
SYSTOLIC BLOOD PRESSURE: 124 MMHG | WEIGHT: 219.81 LBS | HEART RATE: 74 BPM | DIASTOLIC BLOOD PRESSURE: 80 MMHG | BODY MASS INDEX: 32.46 KG/M2

## 2019-08-14 DIAGNOSIS — F39 MOOD DISORDER: ICD-10-CM

## 2019-08-14 PROCEDURE — 3074F SYST BP LT 130 MM HG: CPT | Mod: HCNC,CPTII,S$GLB, | Performed by: PSYCHIATRY & NEUROLOGY

## 2019-08-14 PROCEDURE — 99213 PR OFFICE/OUTPT VISIT, EST, LEVL III, 20-29 MIN: ICD-10-PCS | Mod: HCNC,S$GLB,, | Performed by: PSYCHIATRY & NEUROLOGY

## 2019-08-14 PROCEDURE — 99213 OFFICE O/P EST LOW 20 MIN: CPT | Mod: HCNC,S$GLB,, | Performed by: PSYCHIATRY & NEUROLOGY

## 2019-08-14 PROCEDURE — 3008F PR BODY MASS INDEX (BMI) DOCUMENTED: ICD-10-PCS | Mod: HCNC,CPTII,S$GLB, | Performed by: PSYCHIATRY & NEUROLOGY

## 2019-08-14 PROCEDURE — 99999 PR PBB SHADOW E&M-EST. PATIENT-LVL II: CPT | Mod: PBBFAC,HCNC,, | Performed by: PSYCHIATRY & NEUROLOGY

## 2019-08-14 PROCEDURE — 3079F PR MOST RECENT DIASTOLIC BLOOD PRESSURE 80-89 MM HG: ICD-10-PCS | Mod: HCNC,CPTII,S$GLB, | Performed by: PSYCHIATRY & NEUROLOGY

## 2019-08-14 PROCEDURE — 3079F DIAST BP 80-89 MM HG: CPT | Mod: HCNC,CPTII,S$GLB, | Performed by: PSYCHIATRY & NEUROLOGY

## 2019-08-14 PROCEDURE — 3008F BODY MASS INDEX DOCD: CPT | Mod: HCNC,CPTII,S$GLB, | Performed by: PSYCHIATRY & NEUROLOGY

## 2019-08-14 PROCEDURE — 3074F PR MOST RECENT SYSTOLIC BLOOD PRESSURE < 130 MM HG: ICD-10-PCS | Mod: HCNC,CPTII,S$GLB, | Performed by: PSYCHIATRY & NEUROLOGY

## 2019-08-14 PROCEDURE — 99999 PR PBB SHADOW E&M-EST. PATIENT-LVL II: ICD-10-PCS | Mod: PBBFAC,HCNC,, | Performed by: PSYCHIATRY & NEUROLOGY

## 2019-08-14 RX ORDER — LAMOTRIGINE 200 MG/1
TABLET ORAL
Qty: 90 TABLET | Refills: 1 | Status: SHIPPED | OUTPATIENT
Start: 2019-08-14 | End: 2019-11-21 | Stop reason: SDUPTHER

## 2019-08-14 RX ORDER — SERTRALINE HYDROCHLORIDE 100 MG/1
100 TABLET, FILM COATED ORAL DAILY
Qty: 90 TABLET | Refills: 1 | Status: SHIPPED | OUTPATIENT
Start: 2019-08-14 | End: 2019-11-21 | Stop reason: SDUPTHER

## 2019-08-14 RX ORDER — RISPERIDONE 1 MG/1
1 TABLET ORAL NIGHTLY
Qty: 90 TABLET | Refills: 1 | Status: SHIPPED | OUTPATIENT
Start: 2019-08-14 | End: 2019-11-21 | Stop reason: SDUPTHER

## 2019-08-14 NOTE — PROGRESS NOTES
ESTABLISHED OUTPATIENT VISIT   E/M LEVEL 3: [id number]    ENCOUNTER DATE: 8/14/2019  SITE: [facility]    HISTORY    CHIEF COMPLAINT   Roni Espinoza is a 58 y.o. male who presents for follow up of Cognitive d/o N.O.S., Mood d/o N.O.S., Psychosis N.O.S.  .    HPI     Came to today's visit alone via bus.    No EPS noted on exam today.    Denies recent seizure.    States, that he has been staying at home. Has not been going to the library.    Psychiatric Review Of Systems - Is patient experiencing or having changes in:  sleep: about 6 hours, at times sleeps during the day  appetite: no  Weight: no  energy/anergy: no  interest/pleasure/anhedonia: no  somatic symptoms: no  libido: no  anxiety/panic: no  guilty/hopelessness: no  concentration: no  S.I.B.s/risky behavior: no  Irritability: no  Racing thoughts: no  Impulsive behaviors: no  Paranoia:no  AVH:no    Recent alcohol: no  Recent drug: no    Medical ROS   Right knee pain     PAST MEDICAL, FAMILY AND SOCIAL HISTORY: The patient's past medical, family and social history have been reviewed and updated as appropriate within the electronic medical record - see encounter notes.    PSYCHOTROPIC MEDICATIONS   Lamictal 200 mg qam, Zoloft 100 mg qam, Risperdal 1 mg at bedtime, Folbic at bedtime    EXAMINATION    Vitals:    08/14/19 1523   BP: 124/80   Pulse: 74     Weight 219 lb's    CONSTITUTIONAL  General Appearance: well nourished    MUSCULOSKELETAL  Muscle Strength and Tone: normal strength and tone  Abnormal Involuntary Movements: no abnormal movement noted  Gait and Station: normal gait    PSYCHIATRIC   Level of Consciousness: alert  Orientation: states, that today March 13th, 2019  Grooming: well groomed  Psychomotor Behavior: no restlessness/agitation  Speech: normal in rate, rhythm and volume  Language: normal vocabulary  Mood: steady  Affect: appropriate  Thought Process: some disorganization  Associations: intact associations  Thought  Content: no SI/HI  Memory: grossly intact  Attention: intact to content of interview  Fund of Knowledge: appears adequate  Insight: good  Judgement: good    MEDICAL DECISION MAKING    DIAGNOSES  Cognitive d/o N.O.S., Depressive d/o unspecified, Psychosis N.O.S.    PROBLEM LIST AND MANAGEMENT PLANS    - depressive d/o, psychosis: continue above meds  - rtc 3 months     Time with patient: 20 min    LABORATORY DATA  No visits with results within 3 Month(s) from this visit.   Latest known visit with results is:   Clinical Support on 04/15/2019   Component Date Value Ref Range Status    Right popliteal PSV 04/15/2019 71  cm/s Final    Right CFA PSV 04/15/2019 97  cm/s Final    Right profunda sys PSV 04/15/2019 66  cm/s Final    Right super femoral prox sys PSV 04/15/2019 94  cm/s Final    Right super femoral mid sys PSV 04/15/2019 90  cm/s Final    Right super femoral dist sys PSV 04/15/2019 79  cm/s Final    Right ant tibeal sys PSV 04/15/2019 44  cm/s Final    Right tib/per trunk sys PSV 04/15/2019 72  cm/s Final    Right post tibial sys PSV 04/15/2019 63  cm/s Final    Right peroneal sys PSV 04/15/2019 26  cm/s Final    Right MATT 04/15/2019 1.15   Final    Right External Illiac PSV 04/15/2019 74  cm/s Final    Right super femoral ostial sys PSV 04/15/2019 87  cm/s Final           Vinnie Cotter

## 2019-08-17 ENCOUNTER — PATIENT MESSAGE (OUTPATIENT)
Dept: INTERNAL MEDICINE | Facility: CLINIC | Age: 59
End: 2019-08-17

## 2019-08-19 NOTE — TELEPHONE ENCOUNTER
Please schedule the patient for same-day visit for leg swelling.  Patient can be scheduled with nurse rima Myers or next available provider.

## 2019-09-03 DIAGNOSIS — F39 MOOD DISORDER: ICD-10-CM

## 2019-09-03 RX ORDER — FOLIC ACID-PYRIDOXINE-CYANOCOBALAMIN TAB 2.5-25-2 MG 2.5-25-2 MG
TAB ORAL
Qty: 90 TABLET | Refills: 0 | Status: SHIPPED | OUTPATIENT
Start: 2019-09-03 | End: 2019-09-03 | Stop reason: SDUPTHER

## 2019-11-21 ENCOUNTER — OFFICE VISIT (OUTPATIENT)
Dept: PSYCHIATRY | Facility: CLINIC | Age: 59
End: 2019-11-21
Payer: MEDICARE

## 2019-11-21 VITALS
HEART RATE: 73 BPM | WEIGHT: 207.81 LBS | SYSTOLIC BLOOD PRESSURE: 131 MMHG | DIASTOLIC BLOOD PRESSURE: 82 MMHG | BODY MASS INDEX: 30.68 KG/M2

## 2019-11-21 DIAGNOSIS — F39 MOOD DISORDER: ICD-10-CM

## 2019-11-21 PROCEDURE — 99499 UNLISTED E&M SERVICE: CPT | Mod: HCNC,S$GLB,, | Performed by: PSYCHIATRY & NEUROLOGY

## 2019-11-21 PROCEDURE — 99499 RISK ADDL DX/OHS AUDIT: ICD-10-PCS | Mod: HCNC,S$GLB,, | Performed by: PSYCHIATRY & NEUROLOGY

## 2019-11-21 PROCEDURE — 3008F PR BODY MASS INDEX (BMI) DOCUMENTED: ICD-10-PCS | Mod: HCNC,CPTII,S$GLB, | Performed by: PSYCHIATRY & NEUROLOGY

## 2019-11-21 PROCEDURE — 99213 PR OFFICE/OUTPT VISIT, EST, LEVL III, 20-29 MIN: ICD-10-PCS | Mod: HCNC,S$GLB,, | Performed by: PSYCHIATRY & NEUROLOGY

## 2019-11-21 PROCEDURE — 3079F PR MOST RECENT DIASTOLIC BLOOD PRESSURE 80-89 MM HG: ICD-10-PCS | Mod: HCNC,CPTII,S$GLB, | Performed by: PSYCHIATRY & NEUROLOGY

## 2019-11-21 PROCEDURE — 99999 PR PBB SHADOW E&M-EST. PATIENT-LVL II: CPT | Mod: PBBFAC,HCNC,, | Performed by: PSYCHIATRY & NEUROLOGY

## 2019-11-21 PROCEDURE — 3075F SYST BP GE 130 - 139MM HG: CPT | Mod: HCNC,CPTII,S$GLB, | Performed by: PSYCHIATRY & NEUROLOGY

## 2019-11-21 PROCEDURE — 99999 PR PBB SHADOW E&M-EST. PATIENT-LVL II: ICD-10-PCS | Mod: PBBFAC,HCNC,, | Performed by: PSYCHIATRY & NEUROLOGY

## 2019-11-21 PROCEDURE — 3008F BODY MASS INDEX DOCD: CPT | Mod: HCNC,CPTII,S$GLB, | Performed by: PSYCHIATRY & NEUROLOGY

## 2019-11-21 PROCEDURE — 3079F DIAST BP 80-89 MM HG: CPT | Mod: HCNC,CPTII,S$GLB, | Performed by: PSYCHIATRY & NEUROLOGY

## 2019-11-21 PROCEDURE — 3075F PR MOST RECENT SYSTOLIC BLOOD PRESS GE 130-139MM HG: ICD-10-PCS | Mod: HCNC,CPTII,S$GLB, | Performed by: PSYCHIATRY & NEUROLOGY

## 2019-11-21 PROCEDURE — 99213 OFFICE O/P EST LOW 20 MIN: CPT | Mod: HCNC,S$GLB,, | Performed by: PSYCHIATRY & NEUROLOGY

## 2019-11-21 RX ORDER — RISPERIDONE 1 MG/1
1 TABLET ORAL NIGHTLY
Qty: 90 TABLET | Refills: 1 | Status: SHIPPED | OUTPATIENT
Start: 2019-11-21 | End: 2020-03-25 | Stop reason: SDUPTHER

## 2019-11-21 RX ORDER — SERTRALINE HYDROCHLORIDE 100 MG/1
100 TABLET, FILM COATED ORAL DAILY
Qty: 90 TABLET | Refills: 1 | Status: SHIPPED | OUTPATIENT
Start: 2019-11-21 | End: 2020-03-25 | Stop reason: SDUPTHER

## 2019-11-21 RX ORDER — LAMOTRIGINE 200 MG/1
TABLET ORAL
Qty: 90 TABLET | Refills: 1 | Status: SHIPPED | OUTPATIENT
Start: 2019-11-21 | End: 2020-03-25 | Stop reason: SDUPTHER

## 2019-11-21 NOTE — PROGRESS NOTES
ESTABLISHED OUTPATIENT VISIT   E/M LEVEL 3: [id number]    ENCOUNTER DATE: 11/21/2019  SITE: [facility] [address]    HISTORY    CHIEF COMPLAINT   Roni Espinoza is a 58 y.o. male who presents for follow up of Cognitive d/o N.O.S., Mood d/o N.O.S., Psychosis N.O.S.  .    HPI     Came to today's visit alone via bus.    No EPS noted on exam today.    Denies recent seizure.    Pt. Reports some depression. No SI. Overall appears to be doing reasonably well.    Psychiatric Review Of Systems - Is patient experiencing or having changes in:  sleep: has recently worried about his wife  appetite: no  Weight: no  energy/anergy: no  interest/pleasure/anhedonia: no  somatic symptoms: no  libido: no  anxiety/panic: no  guilty/hopelessness: no  concentration: no  S.I.B.s/risky behavior: no  Irritability: no  Racing thoughts: no  Impulsive behaviors: no  Paranoia:no  AVH:no    Recent alcohol: some wine last night  Recent drug: no    Medical ROS   Right knee pain     PAST MEDICAL, FAMILY AND SOCIAL HISTORY: The patient's past medical, family and social history have been reviewed and updated as appropriate within the electronic medical record - see encounter notes.    PSYCHOTROPIC MEDICATIONS   Lamictal 200 mg qam, Zoloft 100 mg qam, Risperdal 1 mg at bedtime, Folbic at bedtime    EXAMINATION    Vitals:    11/21/19 1548   BP: 131/82   Pulse: 73     Weight 207 lb's    CONSTITUTIONAL  General Appearance: well nourished    MUSCULOSKELETAL  Muscle Strength and Tone: normal strength and tone  Abnormal Involuntary Movements: no abnormal movement noted  Gait and Station: normal gait    PSYCHIATRIC   Level of Consciousness: alert  Orientation: states, that today is November 19th 2019  Grooming: well groomed  Psychomotor Behavior: no restlessness/agitation  Speech: normal in rate, rhythm and volume  Language: normal vocabulary  Mood: steady  Affect: appropriate  Thought Process: some disorganization  Associations: intact  associations  Thought Content: no SI/HI  Memory: grossly intact  Attention: intact to content of interview  Fund of Knowledge: appears adequate  Insight: good  Judgement: good    MEDICAL DECISION MAKING    DIAGNOSES  Cognitive d/o N.O.S., Depressive d/o unspecified, Psychosis N.O.S.    PROBLEM LIST AND MANAGEMENT PLANS    - depressive d/o, psychosis: continue above meds  - rtc 3 months     Time with patient: 20 min    LABORATORY DATA  No visits with results within 3 Month(s) from this visit.   Latest known visit with results is:   Clinical Support on 04/15/2019   Component Date Value Ref Range Status    Right popliteal PSV 04/15/2019 71  cm/s Final    Right CFA PSV 04/15/2019 97  cm/s Final    Right profunda sys PSV 04/15/2019 66  cm/s Final    Right super femoral prox sys PSV 04/15/2019 94  cm/s Final    Right super femoral mid sys PSV 04/15/2019 90  cm/s Final    Right super femoral dist sys PSV 04/15/2019 79  cm/s Final    Right ant tibeal sys PSV 04/15/2019 44  cm/s Final    Right tib/per trunk sys PSV 04/15/2019 72  cm/s Final    Right post tibial sys PSV 04/15/2019 63  cm/s Final    Right peroneal sys PSV 04/15/2019 26  cm/s Final    Right MATT 04/15/2019 1.15   Final    Right External Illiac PSV 04/15/2019 74  cm/s Final    Right super femoral ostial sys PSV 04/15/2019 87  cm/s Final           Vinnie Cotter

## 2020-02-10 ENCOUNTER — PATIENT MESSAGE (OUTPATIENT)
Dept: INTERNAL MEDICINE | Facility: CLINIC | Age: 60
End: 2020-02-10

## 2020-03-25 ENCOUNTER — TELEPHONE (OUTPATIENT)
Dept: PSYCHIATRY | Facility: CLINIC | Age: 60
End: 2020-03-25

## 2020-03-25 DIAGNOSIS — F39 MOOD DISORDER: ICD-10-CM

## 2020-03-25 RX ORDER — SERTRALINE HYDROCHLORIDE 100 MG/1
100 TABLET, FILM COATED ORAL DAILY
Qty: 90 TABLET | Refills: 1 | Status: SHIPPED | OUTPATIENT
Start: 2020-03-25 | End: 2020-07-28 | Stop reason: SDUPTHER

## 2020-03-25 RX ORDER — RISPERIDONE 1 MG/1
1 TABLET ORAL NIGHTLY
Qty: 90 TABLET | Refills: 1 | Status: SHIPPED | OUTPATIENT
Start: 2020-03-25 | End: 2020-07-28 | Stop reason: SDUPTHER

## 2020-03-25 RX ORDER — LAMOTRIGINE 200 MG/1
TABLET ORAL
Qty: 90 TABLET | Refills: 1 | Status: SHIPPED | OUTPATIENT
Start: 2020-03-25 | End: 2020-07-28 | Stop reason: SDUPTHER

## 2020-04-30 ENCOUNTER — PATIENT MESSAGE (OUTPATIENT)
Dept: ADMINISTRATIVE | Facility: OTHER | Age: 60
End: 2020-04-30

## 2020-05-12 ENCOUNTER — PATIENT MESSAGE (OUTPATIENT)
Dept: OTHER | Facility: OTHER | Age: 60
End: 2020-05-12

## 2020-07-18 ENCOUNTER — OFFICE VISIT (OUTPATIENT)
Dept: INTERNAL MEDICINE | Facility: CLINIC | Age: 60
End: 2020-07-18
Payer: MEDICARE

## 2020-07-18 ENCOUNTER — LAB VISIT (OUTPATIENT)
Dept: LAB | Facility: HOSPITAL | Age: 60
End: 2020-07-18
Attending: INTERNAL MEDICINE
Payer: MEDICARE

## 2020-07-18 VITALS
WEIGHT: 197.06 LBS | DIASTOLIC BLOOD PRESSURE: 84 MMHG | BODY MASS INDEX: 28.21 KG/M2 | SYSTOLIC BLOOD PRESSURE: 112 MMHG | OXYGEN SATURATION: 99 % | HEIGHT: 70 IN | HEART RATE: 87 BPM

## 2020-07-18 DIAGNOSIS — R20.2 PARESTHESIA OF SKIN: ICD-10-CM

## 2020-07-18 DIAGNOSIS — L72.0 EPIDERMOID CYST OF NECK: Primary | ICD-10-CM

## 2020-07-18 DIAGNOSIS — E78.2 MIXED HYPERLIPIDEMIA: ICD-10-CM

## 2020-07-18 DIAGNOSIS — D64.9 NORMOCYTIC ANEMIA: ICD-10-CM

## 2020-07-18 DIAGNOSIS — G89.29 CHRONIC PAIN OF LEFT KNEE: ICD-10-CM

## 2020-07-18 DIAGNOSIS — M25.562 CHRONIC PAIN OF LEFT KNEE: ICD-10-CM

## 2020-07-18 DIAGNOSIS — G40.309 GENERALIZED CONVULSIVE EPILEPSY WITHOUT INTRACTABLE EPILEPSY: ICD-10-CM

## 2020-07-18 DIAGNOSIS — I10 ESSENTIAL HYPERTENSION: ICD-10-CM

## 2020-07-18 DIAGNOSIS — F39 MOOD DISORDER: ICD-10-CM

## 2020-07-18 DIAGNOSIS — M54.9 DORSALGIA, UNSPECIFIED: ICD-10-CM

## 2020-07-18 DIAGNOSIS — Z12.5 PROSTATE CANCER SCREENING: ICD-10-CM

## 2020-07-18 LAB
ALBUMIN SERPL BCP-MCNC: 4.1 G/DL (ref 3.5–5.2)
ALP SERPL-CCNC: 94 U/L (ref 55–135)
ALT SERPL W/O P-5'-P-CCNC: 17 U/L (ref 10–44)
ANION GAP SERPL CALC-SCNC: 8 MMOL/L (ref 8–16)
AST SERPL-CCNC: 23 U/L (ref 10–40)
BILIRUB SERPL-MCNC: 0.3 MG/DL (ref 0.1–1)
BUN SERPL-MCNC: 15 MG/DL (ref 8–23)
CALCIUM SERPL-MCNC: 9.2 MG/DL (ref 8.7–10.5)
CHLORIDE SERPL-SCNC: 105 MMOL/L (ref 95–110)
CHOLEST SERPL-MCNC: 252 MG/DL (ref 120–199)
CHOLEST/HDLC SERPL: 4.3 {RATIO} (ref 2–5)
CO2 SERPL-SCNC: 27 MMOL/L (ref 23–29)
COMPLEXED PSA SERPL-MCNC: 1 NG/ML (ref 0–4)
CREAT SERPL-MCNC: 1.2 MG/DL (ref 0.5–1.4)
ERYTHROCYTE [DISTWIDTH] IN BLOOD BY AUTOMATED COUNT: 13.8 % (ref 11.5–14.5)
EST. GFR  (AFRICAN AMERICAN): >60 ML/MIN/1.73 M^2
EST. GFR  (NON AFRICAN AMERICAN): >60 ML/MIN/1.73 M^2
FERRITIN SERPL-MCNC: 87 NG/ML (ref 20–300)
GLUCOSE SERPL-MCNC: 73 MG/DL (ref 70–110)
HCT VFR BLD AUTO: 44.7 % (ref 40–54)
HDLC SERPL-MCNC: 59 MG/DL (ref 40–75)
HDLC SERPL: 23.4 % (ref 20–50)
HGB BLD-MCNC: 14.4 G/DL (ref 14–18)
IRON SERPL-MCNC: 61 UG/DL (ref 45–160)
LDLC SERPL CALC-MCNC: 171.4 MG/DL (ref 63–159)
MAGNESIUM SERPL-MCNC: 2.1 MG/DL (ref 1.6–2.6)
MCH RBC QN AUTO: 30.8 PG (ref 27–31)
MCHC RBC AUTO-ENTMCNC: 32.2 G/DL (ref 32–36)
MCV RBC AUTO: 96 FL (ref 82–98)
NONHDLC SERPL-MCNC: 193 MG/DL
PLATELET # BLD AUTO: 192 K/UL (ref 150–350)
PMV BLD AUTO: 11.6 FL (ref 9.2–12.9)
POTASSIUM SERPL-SCNC: 3.4 MMOL/L (ref 3.5–5.1)
PROT SERPL-MCNC: 7.7 G/DL (ref 6–8.4)
RBC # BLD AUTO: 4.68 M/UL (ref 4.6–6.2)
SATURATED IRON: 22 % (ref 20–50)
SODIUM SERPL-SCNC: 140 MMOL/L (ref 136–145)
TOTAL IRON BINDING CAPACITY: 275 UG/DL (ref 250–450)
TRANSFERRIN SERPL-MCNC: 186 MG/DL (ref 200–375)
TRIGL SERPL-MCNC: 108 MG/DL (ref 30–150)
URATE SERPL-MCNC: 8.2 MG/DL (ref 3.4–7)
VIT B12 SERPL-MCNC: 1458 PG/ML (ref 210–950)
WBC # BLD AUTO: 6.75 K/UL (ref 3.9–12.7)

## 2020-07-18 PROCEDURE — 3074F PR MOST RECENT SYSTOLIC BLOOD PRESSURE < 130 MM HG: ICD-10-PCS | Mod: HCNC,CPTII,S$GLB, | Performed by: INTERNAL MEDICINE

## 2020-07-18 PROCEDURE — 80053 COMPREHEN METABOLIC PANEL: CPT | Mod: HCNC

## 2020-07-18 PROCEDURE — 80061 LIPID PANEL: CPT | Mod: HCNC

## 2020-07-18 PROCEDURE — 99999 PR PBB SHADOW E&M-EST. PATIENT-LVL V: CPT | Mod: PBBFAC,HCNC,, | Performed by: INTERNAL MEDICINE

## 2020-07-18 PROCEDURE — 99499 UNLISTED E&M SERVICE: CPT | Mod: HCNC,S$GLB,, | Performed by: INTERNAL MEDICINE

## 2020-07-18 PROCEDURE — 3008F BODY MASS INDEX DOCD: CPT | Mod: HCNC,CPTII,S$GLB, | Performed by: INTERNAL MEDICINE

## 2020-07-18 PROCEDURE — 82728 ASSAY OF FERRITIN: CPT | Mod: HCNC

## 2020-07-18 PROCEDURE — 99215 OFFICE O/P EST HI 40 MIN: CPT | Mod: HCNC,S$GLB,, | Performed by: INTERNAL MEDICINE

## 2020-07-18 PROCEDURE — 83735 ASSAY OF MAGNESIUM: CPT | Mod: HCNC

## 2020-07-18 PROCEDURE — 82607 VITAMIN B-12: CPT | Mod: HCNC

## 2020-07-18 PROCEDURE — 99499 RISK ADDL DX/OHS AUDIT: ICD-10-PCS | Mod: HCNC,S$GLB,, | Performed by: INTERNAL MEDICINE

## 2020-07-18 PROCEDURE — 3008F PR BODY MASS INDEX (BMI) DOCUMENTED: ICD-10-PCS | Mod: HCNC,CPTII,S$GLB, | Performed by: INTERNAL MEDICINE

## 2020-07-18 PROCEDURE — 99999 PR PBB SHADOW E&M-EST. PATIENT-LVL V: ICD-10-PCS | Mod: PBBFAC,HCNC,, | Performed by: INTERNAL MEDICINE

## 2020-07-18 PROCEDURE — 83540 ASSAY OF IRON: CPT | Mod: HCNC

## 2020-07-18 PROCEDURE — 3079F PR MOST RECENT DIASTOLIC BLOOD PRESSURE 80-89 MM HG: ICD-10-PCS | Mod: HCNC,CPTII,S$GLB, | Performed by: INTERNAL MEDICINE

## 2020-07-18 PROCEDURE — 84153 ASSAY OF PSA TOTAL: CPT | Mod: HCNC

## 2020-07-18 PROCEDURE — 36415 COLL VENOUS BLD VENIPUNCTURE: CPT | Mod: HCNC

## 2020-07-18 PROCEDURE — 84550 ASSAY OF BLOOD/URIC ACID: CPT | Mod: HCNC

## 2020-07-18 PROCEDURE — 3074F SYST BP LT 130 MM HG: CPT | Mod: HCNC,CPTII,S$GLB, | Performed by: INTERNAL MEDICINE

## 2020-07-18 PROCEDURE — 99215 PR OFFICE/OUTPT VISIT, EST, LEVL V, 40-54 MIN: ICD-10-PCS | Mod: HCNC,S$GLB,, | Performed by: INTERNAL MEDICINE

## 2020-07-18 PROCEDURE — 3079F DIAST BP 80-89 MM HG: CPT | Mod: HCNC,CPTII,S$GLB, | Performed by: INTERNAL MEDICINE

## 2020-07-18 PROCEDURE — 85027 COMPLETE CBC AUTOMATED: CPT | Mod: HCNC

## 2020-07-18 NOTE — PROGRESS NOTES
Subjective:       Patient ID: Roni Espinoza is a 59 y.o. male.    Chief Complaint: Cyst (neck)      Last visit with me 4/11/2019.  Since then seen by neurosurgery and psychiatry. Upcoming Psychiatry appointment.    HPI    Patient notes a knot on the back of his neck for the last few months.  Found incidentally.  No tenderness and no drainage, but he does report headaches that he attributes to the presence of the cyst.  No itching or burning.    Severe pain in the left knee.  This is been going on for many months.  Using meloxicam without relief.  He did fall once related to knee pain, however the knee pain was going on even before the fall.  Throbbing and aching pain, associated also with numbness posteriorly in the left knee.  Reports that his right knee is fine.  Has tried wrapping the knee with an Ace bandage but this has not been effective.    Also has low back pain in the middle part of his low back.  Happens at least once a day.  Worse with movement.  No trauma.    Right hand with numbness and burning.  Symptoms are worse in the middle of the night and early morning.  Has a wrist brace for the wrist but does not wear this regularly.    Review of Systems   Constitutional: Positive for activity change. Negative for unexpected weight change.   HENT: Negative for hearing loss, rhinorrhea and trouble swallowing.    Eyes: Positive for visual disturbance. Negative for discharge.   Respiratory: Negative for chest tightness and wheezing.    Cardiovascular: Negative for chest pain and palpitations.   Gastrointestinal: Negative for blood in stool, constipation, diarrhea and vomiting.   Endocrine: Negative for polydipsia and polyuria.   Genitourinary: Positive for hematuria. Negative for difficulty urinating and urgency.   Musculoskeletal: Positive for arthralgias. Negative for joint swelling and neck pain.   Neurological: Negative for weakness and headaches.   Psychiatric/Behavioral: Negative for confusion and dysphoric  "mood.       Objective:      Physical Exam  Vitals signs and nursing note reviewed.   Constitutional:       General: He is not in acute distress.     Appearance: He is not diaphoretic.   Neck:      Musculoskeletal: Normal range of motion.      Thyroid: No thyromegaly.   Cardiovascular:      Rate and Rhythm: Normal rate and regular rhythm.      Pulses:           Radial pulses are 2+ on the right side.      Heart sounds: Normal heart sounds. No murmur. No friction rub. No gallop.    Pulmonary:      Effort: Pulmonary effort is normal.      Breath sounds: Normal breath sounds. No stridor. No wheezing or rales.   Musculoskeletal:      Right knee: Normal.      Left knee: He exhibits swelling and effusion. He exhibits no ecchymosis, no deformity and no erythema. No tenderness found.      Lumbar back: He exhibits tenderness (to deep palpation overlying lumbar spine) and pain. He exhibits no bony tenderness, no swelling and no edema.      Right hand: Normal.      Left lower leg: He exhibits no tenderness and no swelling. No edema.   Lymphadenopathy:      Cervical: No cervical adenopathy.   Skin:     General: Skin is warm and dry.      Findings: No rash.      Comments: Cyst in posterior neck near base of skull without overlying skin change   Neurological:      Mental Status: He is alert and oriented to person, place, and time.   Psychiatric:         Mood and Affect: Mood normal.         Behavior: Behavior normal.         Vitals:    07/18/20 1346   BP: 112/84   BP Location: Left arm   Patient Position: Sitting   BP Method: Medium (Manual)   Pulse: 87   SpO2: 99%   Weight: 89.4 kg (197 lb 1.5 oz)   Height: 5' 9.5" (1.765 m)     Body mass index is 28.69 kg/m².    RESULTS: Reviewed labs from last 18 months    Assessment:       1. Epidermoid cyst of neck    2. Normocytic anemia    3. Essential hypertension    4. Mixed hyperlipidemia    5. Mood disorder    6. Prostate cancer screening    7. Chronic pain of left knee    8. Dorsalgia, " unspecified    9. Paresthesia of skin    10. Generalized convulsive epilepsy without intractable epilepsy        Plan:   Roni was seen today for cyst.    Diagnoses and all orders for this visit:    Epidermoid cyst of neck:  New problem, patient reports causing headache, refer to General Surgery for evaluation.  -     Ambulatory referral/consult to General Surgery; Future    Normocytic anemia:  Prior diagnosis, stable, continue to monitor on labs.  -     CBC Without Differential; Future  -     Ferritin; Future  -     Iron and TIBC; Future    Essential hypertension:  Prior diagnosis, stable, well controlled on current management. No changes at this time, will continue to monitor.   -     Comprehensive metabolic panel; Future  -     Magnesium; Future    Mixed hyperlipidemia:  Prior diagnosis, stable, well controlled on current management. No changes at this time, will continue to monitor.   -     Lipid Panel; Future    Mood disorder:  Prior diagnosis, stable, continue follow up with Psychiatry.    Prostate cancer screening  -     PSA, Screening; Future    Chronic pain of left knee:  Prior diagnosis, symptoms are worsening. Last xray in 2018 showed only mild DJD. Repeat xray and refer to Orthopedics.  -     X-ray Knee Ortho Bilateral; Future  -     Uric acid; Future  -     Ambulatory referral/consult to Orthopedics; Future    Dorsalgia, unspecified:  Prior diagnosis, worse with movement, most likely muscle spasm, check for any abnormality on xray and see Orthopedics.  -     X-Ray Lumbar Spine Ap And Lateral; Future    Paresthesia of skin:  New problem, in R hand, associated with pain, possibly carpal tunnel syndrome. Use wrist splint at night. Order EMG.  -     EMG W/ ULTRASOUND AND NERVE CONDUCTION TEST 1 Extremity; Future  -     Vitamin B12; Future    Generalized convulsive epilepsy without intractable epilepsy:  Prior diagnosis, stable, well controlled on current management. No changes at this time, will continue  to monitor.       Follow up in about 6 months (around 1/18/2021) for follow up visit.  Shelton Cason MD  Internal Medicine    Portions of this note were completed using medical dictation software. Please excuse typographical or syntax errors that were missed on review.

## 2020-07-18 NOTE — PATIENT INSTRUCTIONS
Please use your wrist brace/splint on the right wrist at night when sleeping to keep the wrist from bending during sleep. We will also get a nerve conduction study to identify where the problem is with the nerve.

## 2020-07-19 ENCOUNTER — TELEPHONE (OUTPATIENT)
Dept: INTERNAL MEDICINE | Facility: CLINIC | Age: 60
End: 2020-07-19

## 2020-07-19 DIAGNOSIS — E79.0 ELEVATED URIC ACID IN BLOOD: ICD-10-CM

## 2020-07-19 DIAGNOSIS — E87.6 HYPOKALEMIA: Primary | ICD-10-CM

## 2020-07-19 DIAGNOSIS — I10 ESSENTIAL HYPERTENSION: ICD-10-CM

## 2020-07-19 DIAGNOSIS — E78.2 MIXED HYPERLIPIDEMIA: ICD-10-CM

## 2020-07-19 RX ORDER — AMLODIPINE BESYLATE 5 MG/1
5 TABLET ORAL DAILY
Qty: 90 TABLET | Refills: 3 | Status: SHIPPED | OUTPATIENT
Start: 2020-07-19 | End: 2021-09-03

## 2020-07-20 ENCOUNTER — OFFICE VISIT (OUTPATIENT)
Dept: ORTHOPEDICS | Facility: CLINIC | Age: 60
End: 2020-07-20
Payer: MEDICARE

## 2020-07-20 ENCOUNTER — HOSPITAL ENCOUNTER (OUTPATIENT)
Dept: RADIOLOGY | Facility: HOSPITAL | Age: 60
Discharge: HOME | End: 2020-07-20
Attending: INTERNAL MEDICINE
Payer: MEDICARE

## 2020-07-20 ENCOUNTER — OFFICE VISIT (OUTPATIENT)
Dept: SURGERY | Facility: CLINIC | Age: 60
End: 2020-07-20
Payer: MEDICARE

## 2020-07-20 VITALS
HEIGHT: 70 IN | DIASTOLIC BLOOD PRESSURE: 80 MMHG | WEIGHT: 198 LBS | HEART RATE: 79 BPM | SYSTOLIC BLOOD PRESSURE: 129 MMHG | BODY MASS INDEX: 28.35 KG/M2

## 2020-07-20 VITALS — WEIGHT: 200.31 LBS | BODY MASS INDEX: 29.67 KG/M2 | HEIGHT: 69 IN

## 2020-07-20 DIAGNOSIS — M54.9 DORSALGIA, UNSPECIFIED: ICD-10-CM

## 2020-07-20 DIAGNOSIS — M51.36 DDD (DEGENERATIVE DISC DISEASE), LUMBAR: Primary | ICD-10-CM

## 2020-07-20 DIAGNOSIS — M25.562 CHRONIC PAIN OF LEFT KNEE: ICD-10-CM

## 2020-07-20 DIAGNOSIS — L72.0 EPIDERMOID CYST OF NECK: ICD-10-CM

## 2020-07-20 DIAGNOSIS — M17.12 PRIMARY OSTEOARTHRITIS OF LEFT KNEE: ICD-10-CM

## 2020-07-20 DIAGNOSIS — G89.29 CHRONIC PAIN OF LEFT KNEE: ICD-10-CM

## 2020-07-20 PROCEDURE — 20610 DRAIN/INJ JOINT/BURSA W/O US: CPT | Mod: HCNC,LT,S$GLB, | Performed by: PHYSICIAN ASSISTANT

## 2020-07-20 PROCEDURE — 72100 XR LUMBAR SPINE AP AND LATERAL: ICD-10-PCS | Mod: 26,HCNC,, | Performed by: RADIOLOGY

## 2020-07-20 PROCEDURE — 72100 X-RAY EXAM L-S SPINE 2/3 VWS: CPT | Mod: 26,HCNC,, | Performed by: RADIOLOGY

## 2020-07-20 PROCEDURE — 20610 LARGE JOINT ASPIRATION/INJECTION: L KNEE: ICD-10-PCS | Mod: HCNC,LT,S$GLB, | Performed by: PHYSICIAN ASSISTANT

## 2020-07-20 PROCEDURE — 99203 PR OFFICE/OUTPT VISIT, NEW, LEVL III, 30-44 MIN: ICD-10-PCS | Mod: HCNC,S$GLB,, | Performed by: SURGERY

## 2020-07-20 PROCEDURE — 3074F SYST BP LT 130 MM HG: CPT | Mod: HCNC,CPTII,S$GLB, | Performed by: SURGERY

## 2020-07-20 PROCEDURE — 99999 PR PBB SHADOW E&M-EST. PATIENT-LVL III: ICD-10-PCS | Mod: PBBFAC,HCNC,, | Performed by: PHYSICIAN ASSISTANT

## 2020-07-20 PROCEDURE — 99999 PR PBB SHADOW E&M-EST. PATIENT-LVL IV: ICD-10-PCS | Mod: PBBFAC,HCNC,, | Performed by: SURGERY

## 2020-07-20 PROCEDURE — 73562 X-RAY EXAM OF KNEE 3: CPT | Mod: TC,50,HCNC

## 2020-07-20 PROCEDURE — 3008F PR BODY MASS INDEX (BMI) DOCUMENTED: ICD-10-PCS | Mod: HCNC,CPTII,S$GLB, | Performed by: PHYSICIAN ASSISTANT

## 2020-07-20 PROCEDURE — 99999 PR PBB SHADOW E&M-EST. PATIENT-LVL III: CPT | Mod: PBBFAC,HCNC,, | Performed by: PHYSICIAN ASSISTANT

## 2020-07-20 PROCEDURE — 99214 OFFICE O/P EST MOD 30 MIN: CPT | Mod: HCNC,25,S$GLB, | Performed by: PHYSICIAN ASSISTANT

## 2020-07-20 PROCEDURE — 99203 OFFICE O/P NEW LOW 30 MIN: CPT | Mod: HCNC,S$GLB,, | Performed by: SURGERY

## 2020-07-20 PROCEDURE — 3079F DIAST BP 80-89 MM HG: CPT | Mod: HCNC,CPTII,S$GLB, | Performed by: SURGERY

## 2020-07-20 PROCEDURE — 3008F BODY MASS INDEX DOCD: CPT | Mod: HCNC,CPTII,S$GLB, | Performed by: PHYSICIAN ASSISTANT

## 2020-07-20 PROCEDURE — 3008F PR BODY MASS INDEX (BMI) DOCUMENTED: ICD-10-PCS | Mod: HCNC,CPTII,S$GLB, | Performed by: SURGERY

## 2020-07-20 PROCEDURE — 3079F PR MOST RECENT DIASTOLIC BLOOD PRESSURE 80-89 MM HG: ICD-10-PCS | Mod: HCNC,CPTII,S$GLB, | Performed by: SURGERY

## 2020-07-20 PROCEDURE — 99999 PR PBB SHADOW E&M-EST. PATIENT-LVL IV: CPT | Mod: PBBFAC,HCNC,, | Performed by: SURGERY

## 2020-07-20 PROCEDURE — 73562 XR KNEE ORTHO BILAT: ICD-10-PCS | Mod: 26,50,HCNC, | Performed by: RADIOLOGY

## 2020-07-20 PROCEDURE — 3074F PR MOST RECENT SYSTOLIC BLOOD PRESSURE < 130 MM HG: ICD-10-PCS | Mod: HCNC,CPTII,S$GLB, | Performed by: SURGERY

## 2020-07-20 PROCEDURE — 99214 PR OFFICE/OUTPT VISIT, EST, LEVL IV, 30-39 MIN: ICD-10-PCS | Mod: HCNC,25,S$GLB, | Performed by: PHYSICIAN ASSISTANT

## 2020-07-20 PROCEDURE — 3008F BODY MASS INDEX DOCD: CPT | Mod: HCNC,CPTII,S$GLB, | Performed by: SURGERY

## 2020-07-20 PROCEDURE — 72100 X-RAY EXAM L-S SPINE 2/3 VWS: CPT | Mod: TC,HCNC

## 2020-07-20 PROCEDURE — 73562 X-RAY EXAM OF KNEE 3: CPT | Mod: 26,50,HCNC, | Performed by: RADIOLOGY

## 2020-07-20 RX ORDER — TRIAMCINOLONE ACETONIDE 40 MG/ML
40 INJECTION, SUSPENSION INTRA-ARTICULAR; INTRAMUSCULAR
Status: DISCONTINUED | OUTPATIENT
Start: 2020-07-20 | End: 2020-07-20 | Stop reason: HOSPADM

## 2020-07-20 RX ORDER — MELOXICAM 15 MG/1
15 TABLET ORAL DAILY
Qty: 30 TABLET | Refills: 0 | Status: SHIPPED | OUTPATIENT
Start: 2020-07-20 | End: 2020-07-20

## 2020-07-20 RX ADMIN — TRIAMCINOLONE ACETONIDE 40 MG: 40 INJECTION, SUSPENSION INTRA-ARTICULAR; INTRAMUSCULAR at 12:07

## 2020-07-20 NOTE — LETTER
July 20, 2020      Shelton Cason MD  [address]  [place] [address]           [facility] General Surgery  [address]  [place] [address]  Phone: [phone]          Patient: Roni Espinoza   MR Number: [id number]   YOB: 1960   Date of Visit: 7/20/2020       Dear Dr. Shelton Cason:    Thank you for referring Roni Espinoza to me for evaluation. Attached you will find relevant portions of my assessment and plan of care.    If you have questions, please do not hesitate to call me. I look forward to following Roni Espinoza along with you.    Sincerely,    Logan Suárez Jr., MD    Enclosure  CC:  No Recipients    If you would like to receive this communication electronically, please contact [email or web address] or [phone] to request more information on [employer] Link access.    For providers and/or their staff who would like to refer a patient to [facility], please contact us through our one-stop-shop provider referral line, [facility], at [phone].    If you feel you have received this communication in error or would no longer like to receive these types of communications, please e-mail [email or web address]

## 2020-07-20 NOTE — PROCEDURES
Large Joint Aspiration/Injection: L knee    Date/Time: 7/20/2020 12:30 PM  Performed by: Magdalena Copeland PA-C  Authorized by: Magdalena Copeland PA-C     Consent Done?:  Yes (Verbal)  Indications:  Pain  Prep: patient was prepped and draped in usual sterile fashion    Local anesthetic:  Topical anesthetic    Details:  Needle Size:  22 G  Approach:  Anterolateral  Location:  Knee  Site:  L knee  Medications:  40 mg triamcinolone acetonide 40 mg/mL  Patient tolerance:  Patient tolerated the procedure well with no immediate complications

## 2020-07-20 NOTE — LETTER
July 20, 2020      Shelton Cason MD  [address]  [facility] [address]           [facility] - Orthopedics  [address]  [facility] [address]  Phone: [phone]          Patient: Roni Espinoza   MR Number: [id number]   YOB: 1960   Date of Visit: 7/20/2020       Dear Dr. Shelton Cason:    Thank you for referring Roni Espinoza to me for evaluation. Attached you will find relevant portions of my assessment and plan of care.    If you have questions, please do not hesitate to call me. I look forward to following Roni Espinoza along with you.    Sincerely,    Magdalena Copeland PA-C    Enclosure  CC:  No Recipients    If you would like to receive this communication electronically, please contact [email or web address] or [phone] to request more information on [employer] Link access.    For providers and/or their staff who would like to refer a patient to [facility], please contact us through our one-stop-shop provider referral line, [facility], at [phone].    If you feel you have received this communication in error or would no longer like to receive these types of communications, please e-mail [email or web address]

## 2020-07-20 NOTE — PROGRESS NOTES
GENERAL SURGERY CLINIC  HISTORY AND PHYSICAL    CC:  cyst    HPI:  Roni Espinoza is a 59 y.o.  male with Hx of HTN and AVM who presents to clinic for a cyst on his neck. Pt reports he first noticed the cyst at the back of his neck about 1 year ago. Since then it has grown more than 2x larger, has become harder, and causes him pain and discomfort. He reports headaches about 4/7 days a week that he relates to his cyst. He says it occasionally throbs also. He reports the cyst has never drained.   Denies nausea, vomiting, changes in vision, fevers, chills. He would like to have it removed.       ROS:   Review of Systems   Constitutional: Negative for chills, fever and weight loss.   HENT: Negative for hearing loss and tinnitus.    Eyes: Negative for blurred vision and double vision.   Respiratory: Negative for cough and shortness of breath.    Cardiovascular: Negative for chest pain and palpitations.   Gastrointestinal: Negative for abdominal pain, constipation, diarrhea, nausea and vomiting.   Genitourinary: Negative for dysuria and urgency.   Musculoskeletal: Positive for neck pain. Negative for myalgias.        Posterior neck at the site of the cyst   Skin: Negative for itching and rash.   Neurological: Positive for headaches. Negative for dizziness, focal weakness, loss of consciousness and weakness.        Past Medical History:   Diagnosis Date    Allergy     Anxiety     Depression     Headache(784.0)     History of psychiatric hospitalization     Hx of psychiatric care     Hypertension     Psychiatric problem     Seizures     last seizure 2006    Therapy        Past Surgical History:   Procedure Laterality Date    BRAIN SURGERY  1990    AV malformation repair    COLONOSCOPY N/A 10/3/2016    Procedure: COLONOSCOPY;  Surgeon: Ehsan Odom MD;  Location: [address]);  Service: Endoscopy;  Laterality: N/A;  ok for me or wes to do colonoscopy per patient would like asap       Social  History     Socioeconomic History    Marital status:      Spouse name: Not on file    Number of children: Not on file    Years of education: Not on file    Highest education level: Not on file   Occupational History    Not on file   Social Needs    Financial resource strain: Very hard    Food insecurity     Worry: Often true     Inability: Often true    Transportation needs     Medical: Yes     Non-medical: Yes   Tobacco Use    Smoking status: Never Smoker    Smokeless tobacco: Never Used   Substance and Sexual Activity    Alcohol use: No     Alcohol/week: 0.0 standard drinks     Frequency: 2-3 times a week     Drinks per session: 3 or 4     Binge frequency: Never    Drug use: No    Sexual activity: Yes     Partners: Female     Birth control/protection: Post-menopausal   Lifestyle    Physical activity     Days per week: 0 days     Minutes per session: 0 min    Stress: To some extent   Relationships    Social connections     Talks on phone: Three times a week     Gets together: Once a week     Attends Judaism service: Not on file     Active member of club or organization: No     Attends meetings of clubs or organizations: Never     Relationship status: Patient refused   Other Topics Concern    Patient feels they ought to cut down on drinking/drug use Not Asked    Patient annoyed by others criticizing their drinking/drug use Not Asked    Patient has felt bad or guilty about drinking/drug use Not Asked    Patient has had a drink/used drugs as an eye opener in the AM Not Asked   Social History Narrative    Not on file       Review of patient's allergies indicates:  No Known Allergies      PHYSICAL EXAM:  Vitals:    07/20/20 1424   BP: 129/80   Pulse: 79     Physical Exam   Constitutional: He is oriented to person, place, and time and well-developed, well-nourished, and in no distress.   HENT:   Head: Normocephalic and atraumatic.   Eyes: Conjunctivae and EOM are normal.   Neck: Normal range  of motion. Neck supple.   Cardiovascular: Normal rate, regular rhythm and normal heart sounds.   Pulmonary/Chest: Effort normal and breath sounds normal. No respiratory distress.   Abdominal: Soft. Bowel sounds are normal. He exhibits no distension.   Musculoskeletal: Normal range of motion.   Neurological: He is alert and oriented to person, place, and time.   Skin: Skin is warm and dry.   Subcutaneous mass located at the base of the skull. About 2-3 cm. Mild tenderness to palpation. Firm.    Psychiatric: Affect and judgment normal.           PERTINENT LABS:  None      PERTINENT IMAGING:  None      ASSESSMENT/PLAN:  Roni Espinoza is a 59 y.o.  male with Hx of HTN and AVM who presents to clinic for a cyst on his neck. Discussed the risks/benefits of cyst removal in clinic. We discussed that it is unlikely his headaches are associated with his cyst and that he should follow with his PCP to discuss them. Pt has elected to have his cyst removed and will schedule a time to return for removal in clinic.   - cyst removal        Gwendolyn Shine MD  [facility] PGY1  Pager: [phone]    I have personally taken the history and examined this patient and agree with the resident's note as stated above.         Logan Suárez MD

## 2020-07-24 ENCOUNTER — PROCEDURE VISIT (OUTPATIENT)
Dept: SURGERY | Facility: CLINIC | Age: 60
End: 2020-07-24
Payer: MEDICARE

## 2020-07-24 VITALS
HEART RATE: 62 BPM | WEIGHT: 198 LBS | DIASTOLIC BLOOD PRESSURE: 96 MMHG | HEIGHT: 70 IN | BODY MASS INDEX: 28.35 KG/M2 | SYSTOLIC BLOOD PRESSURE: 155 MMHG

## 2020-07-24 DIAGNOSIS — L72.3 SEBACEOUS CYST: Primary | ICD-10-CM

## 2020-07-24 PROCEDURE — 88307 TISSUE EXAM BY PATHOLOGIST: CPT | Mod: 26,HCNC,, | Performed by: PATHOLOGY

## 2020-07-24 PROCEDURE — 11423 EXC H-F-NK-SP B9+MARG 2.1-3: CPT | Mod: HCNC,S$GLB,, | Performed by: SURGERY

## 2020-07-24 PROCEDURE — 88307 PR  SURG PATH,LEVEL V: ICD-10-PCS | Mod: 26,HCNC,, | Performed by: PATHOLOGY

## 2020-07-24 PROCEDURE — 11423 EXC, CYST: ICD-10-PCS | Mod: HCNC,S$GLB,, | Performed by: SURGERY

## 2020-07-24 PROCEDURE — 12032 INTMD RPR S/A/T/EXT 2.6-7.5: CPT | Mod: 51,,, | Performed by: SURGERY

## 2020-07-24 PROCEDURE — 88307 TISSUE EXAM BY PATHOLOGIST: CPT | Mod: HCNC | Performed by: PATHOLOGY

## 2020-07-24 PROCEDURE — 12032 PR LAYR CLOS WND TRUNK,ARM,LEG 2.6-7.5 CM: ICD-10-PCS | Mod: 51,,, | Performed by: SURGERY

## 2020-07-24 NOTE — PROCEDURES
"Roni Espinoza is a 59 y.o. male patient.    Pulse: 62 (07/24/20 1054)  BP: (!) 155/96 (07/24/20 1054)  Weight: 89.8 kg (198 lb) (07/24/20 1054)  Height: 5' 10" (177.8 cm) (07/24/20 1054)       Cyst Removal     Date/Time: 7/24/2020 11:00 AM  Performed by: Logan Suárez Jr., MD  Authorized by: Logan Suárez Jr., MD     Time out: Immediately prior to procedure a "time out" was called to verify the correct patient, procedure, equipment, support staff and site/side marked as required.    Consent Done?:  Yes (Written)    Type:  Cyst  Body area:  Head/neck  Location details:  Neck  Anesthesia:  Local infiltration  Local anesthetic: lidocaine 1% with epinephrine  Anesthetic total (ml):  3  Incision type:  Elliptical  Complexity:  Simple  Drainage:  Pus  Drainage amount:  Scant  Wound treatment:  Incision and removal of cyst capsule  Packing material:  None  Patient tolerance:  Patient tolerated the procedure well with no immediate complications        Gwendolyn Shine  7/24/2020  "

## 2020-07-24 NOTE — PROCEDURES
"Exc, Cyst    Date/Time: 7/24/2020 11:00 AM  Performed by: Logan Suárez Jr., MD  Authorized by: Logan Suárez Jr., MD     Consent Done?:  Yes (Written)  Time out: Immediately prior to procedure a "time out" was called to verify the correct patient, procedure, equipment, support staff and site/side marked as required.      STAFF:  Assistants?: No    I was present for the entire procedure.  INDICATIONS:cyst  LOCATION:  Body area:  Scalp    PREP:  Position:  Lateral    ANESTHESIA:  Anesthesia:  Local infiltration  Local anesthetic:  Lidocaine 1% with epinephrine    PROCEDURE DETAILS:  Excision type:  Skin  Malignancy:  Benign  Excision size (cm):  3  Scalpel size:  15  Incision type:  Elliptical  Specimens?: Yes    Specimens submitted to pathology. (cyst)  Hemostasis was obtained.  Estimated blood loss (cc):  2  Wound closure:  Intermediate layered  Wound repair size (cm):  3  Sutures:  3-0 Vicryl and 4-0 Monocryl  Sterile dressings:  Other (comments) (Dermabond)  Post-op diagnosis: Same as pre-op diagnosis.  Needle, instrument, and sponge counts were correct.  Patient tolerated the procedure well with no immediate complications.  Post-operative instructions were provided for the patient.  Patient was discharged and will follow up for wound check and pathology results. and Patient was discharged and will follow up for wound check.      "

## 2020-07-28 ENCOUNTER — OFFICE VISIT (OUTPATIENT)
Dept: PSYCHIATRY | Facility: CLINIC | Age: 60
End: 2020-07-28
Payer: MEDICARE

## 2020-07-28 VITALS
HEIGHT: 69 IN | WEIGHT: 200.38 LBS | SYSTOLIC BLOOD PRESSURE: 151 MMHG | BODY MASS INDEX: 29.68 KG/M2 | HEART RATE: 79 BPM | DIASTOLIC BLOOD PRESSURE: 88 MMHG

## 2020-07-28 DIAGNOSIS — F39 MOOD DISORDER: ICD-10-CM

## 2020-07-28 PROCEDURE — 3077F SYST BP >= 140 MM HG: CPT | Mod: HCNC,CPTII,S$GLB, | Performed by: PSYCHIATRY & NEUROLOGY

## 2020-07-28 PROCEDURE — 99214 OFFICE O/P EST MOD 30 MIN: CPT | Mod: GT,HCNC,S$GLB, | Performed by: PSYCHIATRY & NEUROLOGY

## 2020-07-28 PROCEDURE — 99214 PR OFFICE/OUTPT VISIT, EST, LEVL IV, 30-39 MIN: ICD-10-PCS | Mod: GT,HCNC,S$GLB, | Performed by: PSYCHIATRY & NEUROLOGY

## 2020-07-28 PROCEDURE — 3079F PR MOST RECENT DIASTOLIC BLOOD PRESSURE 80-89 MM HG: ICD-10-PCS | Mod: HCNC,CPTII,S$GLB, | Performed by: PSYCHIATRY & NEUROLOGY

## 2020-07-28 PROCEDURE — 99999 PR PBB SHADOW E&M-EST. PATIENT-LVL II: CPT | Mod: PBBFAC,HCNC,, | Performed by: PSYCHIATRY & NEUROLOGY

## 2020-07-28 PROCEDURE — 99999 PR PBB SHADOW E&M-EST. PATIENT-LVL II: ICD-10-PCS | Mod: PBBFAC,HCNC,, | Performed by: PSYCHIATRY & NEUROLOGY

## 2020-07-28 PROCEDURE — 3079F DIAST BP 80-89 MM HG: CPT | Mod: HCNC,CPTII,S$GLB, | Performed by: PSYCHIATRY & NEUROLOGY

## 2020-07-28 PROCEDURE — 3077F PR MOST RECENT SYSTOLIC BLOOD PRESSURE >= 140 MM HG: ICD-10-PCS | Mod: HCNC,CPTII,S$GLB, | Performed by: PSYCHIATRY & NEUROLOGY

## 2020-07-28 RX ORDER — SERTRALINE HYDROCHLORIDE 100 MG/1
100 TABLET, FILM COATED ORAL DAILY
Qty: 90 TABLET | Refills: 1 | Status: SHIPPED | OUTPATIENT
Start: 2020-07-28 | End: 2020-12-03 | Stop reason: SDUPTHER

## 2020-07-28 RX ORDER — LAMOTRIGINE 200 MG/1
TABLET ORAL
Qty: 90 TABLET | Refills: 1 | Status: SHIPPED | OUTPATIENT
Start: 2020-07-28 | End: 2020-12-03 | Stop reason: SDUPTHER

## 2020-07-28 RX ORDER — RISPERIDONE 1 MG/1
1 TABLET ORAL NIGHTLY
Qty: 90 TABLET | Refills: 1 | Status: SHIPPED | OUTPATIENT
Start: 2020-07-28 | End: 2020-12-03 | Stop reason: SDUPTHER

## 2020-07-28 NOTE — PROGRESS NOTES
"ESTABLISHED OUTPATIENT VISIT   E/M LEVEL 4: [id number]    ENCOUNTER DATE: 7/28/2020  SITE: [facility]    HISTORY    CHIEF COMPLAINT   Roni Espinoza is a 59 y.o. male who presents for follow up of Cognitive d/o N.O.S., Mood d/o N.O.S., Psychosis N.O.S.  .    HPI     Came to today's visit alone via bus.    No EPS noted on exam today.    Denies recent seizure.    Overall appears to be doing reasonably well.    Watches movies.    Psychiatric Review Of Systems - Is patient experiencing or having changes in:  sleep: adequate  appetite: no  Weight: no  energy/anergy: no  interest/pleasure/anhedonia: no  somatic symptoms: no  libido: no  anxiety/panic: no  guilty/hopelessness: no  concentration: no  S.I.B.s/risky behavior: no  Irritability: no  Racing thoughts: no  Impulsive behaviors: no  Paranoia:no  AVH:no    Recent alcohol: denies  Recent drug: no    Medical ROS   knee pain     PAST MEDICAL, FAMILY AND SOCIAL HISTORY: The patient's past medical, family and social history have been reviewed and updated as appropriate within the electronic medical record - see encounter notes.    PSYCHOTROPIC MEDICATIONS   Lamictal 200 mg qam, Zoloft 100 mg qam, Risperdal 1 mg at bedtime, Folbic at bedtime    EXAMINATION    Vitals:    07/28/20 1312   BP: (!) 151/88   Pulse: 79     BP (!) 151/88   Pulse 79   Ht 5' 9" (1.753 m)   Wt 90.9 kg (200 lb 6.4 oz)   BMI 29.59 kg/m²     CONSTITUTIONAL  General Appearance: well nourished    MUSCULOSKELETAL  Muscle Strength and Tone: normal strength and tone  Abnormal Involuntary Movements: no abnormal movement noted  Gait and Station: normal gait    PSYCHIATRIC   Level of Consciousness: alert  Orientation: states, that today is November 19th 2019  Grooming: well groomed  Psychomotor Behavior: no restlessness/agitation  Speech: normal in rate, rhythm and volume  Language: normal vocabulary  Mood: steady  Affect: appropriate  Thought Process: some " disorganization  Associations: intact associations  Thought Content: no SI/HI  Memory: grossly intact  Attention: intact to content of interview  Fund of Knowledge: appears adequate  Insight: good  Judgement: good    MEDICAL DECISION MAKING    DIAGNOSES  Cognitive d/o N.O.S., Depressive d/o unspecified, Psychosis N.O.S.    PROBLEM LIST AND MANAGEMENT PLANS    - depressive d/o, psychosis: continue above meds  - rtc 3 months     Time with patient: 15 min    LABORATORY DATA  Lab Visit on 07/18/2020   Component Date Value Ref Range Status    WBC 07/18/2020 6.75  3.90 - 12.70 K/uL Final    RBC 07/18/2020 4.68  4.60 - 6.20 M/uL Final    Hemoglobin 07/18/2020 14.4  14.0 - 18.0 g/dL Final    Hematocrit 07/18/2020 44.7  40.0 - 54.0 % Final    Mean Corpuscular Volume 07/18/2020 96  82 - 98 fL Final    Mean Corpuscular Hemoglobin 07/18/2020 30.8  27.0 - 31.0 pg Final    Mean Corpuscular Hemoglobin Conc 07/18/2020 32.2  32.0 - 36.0 g/dL Final    RDW 07/18/2020 13.8  11.5 - 14.5 % Final    Platelets 07/18/2020 192  150 - 350 K/uL Final    MPV 07/18/2020 11.6  9.2 - 12.9 fL Final    Sodium 07/18/2020 140  136 - 145 mmol/L Final    Potassium 07/18/2020 3.4* 3.5 - 5.1 mmol/L Final    Chloride 07/18/2020 105  95 - 110 mmol/L Final    CO2 07/18/2020 27  23 - 29 mmol/L Final    Glucose 07/18/2020 73  70 - 110 mg/dL Final    BUN, Bld 07/18/2020 15  8 - 23 mg/dL Final    Creatinine 07/18/2020 1.2  0.5 - 1.4 mg/dL Final    Calcium 07/18/2020 9.2  8.7 - 10.5 mg/dL Final    Total Protein 07/18/2020 7.7  6.0 - 8.4 g/dL Final    Albumin 07/18/2020 4.1  3.5 - 5.2 g/dL Final    Total Bilirubin 07/18/2020 0.3  0.1 - 1.0 mg/dL Final    Comment: For infants and newborns, interpretation of results should be based  on gestational age, weight and in agreement with clinical  observations.  Premature Infant recommended reference ranges:  Up to 24 hours.............<8.0 mg/dL  Up to 48 hours............<12.0 mg/dL  3-5  days..................<15.0 mg/dL  6-29 days.................<15.0 mg/dL      Alkaline Phosphatase 07/18/2020 94  55 - 135 U/L Final    AST 07/18/2020 23  10 - 40 U/L Final    ALT 07/18/2020 17  10 - 44 U/L Final    Anion Gap 07/18/2020 8  8 - 16 mmol/L Final    eGFR if African American 07/18/2020 >60.0  >60 mL/min/1.73 m^2 Final    eGFR if non African American 07/18/2020 >60.0  >60 mL/min/1.73 m^2 Final    Comment: Calculation used to obtain the estimated glomerular filtration  rate (eGFR) is the CKD-EPI equation.       Ferritin 07/18/2020 87  20.0 - 300.0 ng/mL Final    Iron 07/18/2020 61  45 - 160 ug/dL Final    Transferrin 07/18/2020 186* 200 - 375 mg/dL Final    TIBC 07/18/2020 275  250 - 450 ug/dL Final    Saturated Iron 07/18/2020 22  20 - 50 % Final    Cholesterol 07/18/2020 252* 120 - 199 mg/dL Final    Comment: The National Cholesterol Education Program (NCEP) has set the  following guidelines (reference ranges) for Cholesterol:  Optimal.....................<200 mg/dL  Borderline High.............200-239 mg/dL  High........................> or = 240 mg/dL      Triglycerides 07/18/2020 108  30 - 150 mg/dL Final    Comment: The National Cholesterol Education Program (NCEP) has set the  following guidelines (reference values) for triglycerides:  Normal......................<150 mg/dL  Borderline High.............150-199 mg/dL  High........................200-499 mg/dL      HDL 07/18/2020 59  40 - 75 mg/dL Final    Comment: The National Cholesterol Education Program (NCEP) has set the  following guidelines (reference values) for HDL Cholesterol:  Low...............<40 mg/dL  Optimal...........>60 mg/dL      LDL Cholesterol 07/18/2020 171.4* 63.0 - 159.0 mg/dL Final    Comment: The National Cholesterol Education Program (NCEP) has set the  following guidelines (reference values) for LDL Cholesterol:  Optimal.......................<130 mg/dL  Borderline High...............130-159  mg/dL  High..........................160-189 mg/dL  Very High.....................>190 mg/dL      Hdl/Cholesterol Ratio 07/18/2020 23.4  20.0 - 50.0 % Final    Total Cholesterol/HDL Ratio 07/18/2020 4.3  2.0 - 5.0 Final    Non-HDL Cholesterol 07/18/2020 193  mg/dL Final    Comment: Risk category and Non-HDL cholesterol goals:  Coronary heart disease (CHD)or equivalent (10-year risk of CHD >20%):  Non-HDL cholesterol goal     <130 mg/dL  Two or more CHD risk factors and 10-year risk of CHD <= 20%:  Non-HDL cholesterol goal     <160 mg/dL  0 to 1 CHD risk factor:  Non-HDL cholesterol goal     <190 mg/dL      Magnesium 07/18/2020 2.1  1.6 - 2.6 mg/dL Final    PSA, SCREEN 07/18/2020 1.0  0.00 - 4.00 ng/mL Final    Comment: PSA Expected levels:  Hormonal Therapy: <0.05 ng/ml  Prostatectomy: <0.01 ng/ml  Radiation Therapy: <1.00 ng/ml      Uric Acid 07/18/2020 8.2* 3.4 - 7.0 mg/dL Final    Vitamin B-12 07/18/2020 1458* 210 - 950 pg/mL Final           Vinnie Cotter

## 2020-07-30 LAB — FINAL PATHOLOGIC DIAGNOSIS: NORMAL

## 2020-08-07 ENCOUNTER — OFFICE VISIT (OUTPATIENT)
Dept: SURGERY | Facility: CLINIC | Age: 60
End: 2020-08-07
Payer: MEDICARE

## 2020-08-07 VITALS
WEIGHT: 199.06 LBS | TEMPERATURE: 98 F | HEART RATE: 85 BPM | HEIGHT: 69 IN | BODY MASS INDEX: 29.48 KG/M2 | DIASTOLIC BLOOD PRESSURE: 82 MMHG | SYSTOLIC BLOOD PRESSURE: 136 MMHG

## 2020-08-07 DIAGNOSIS — Z09 POSTOP CHECK: Primary | ICD-10-CM

## 2020-08-07 PROCEDURE — 99999 PR PBB SHADOW E&M-EST. PATIENT-LVL III: ICD-10-PCS | Mod: PBBFAC,HCNC,, | Performed by: SURGERY

## 2020-08-07 PROCEDURE — 99024 POSTOP FOLLOW-UP VISIT: CPT | Mod: HCNC,S$GLB,, | Performed by: SURGERY

## 2020-08-07 PROCEDURE — 99024 PR POST-OP FOLLOW-UP VISIT: ICD-10-PCS | Mod: HCNC,S$GLB,, | Performed by: SURGERY

## 2020-08-07 PROCEDURE — 99999 PR PBB SHADOW E&M-EST. PATIENT-LVL III: CPT | Mod: PBBFAC,HCNC,, | Performed by: SURGERY

## 2020-08-07 NOTE — PROGRESS NOTES
"HPI: Patient is a 58 yo M who presents to clinic s/p cyst excision on his neck. Today, he reports resolution of pain. Denies fever, chills, sweating, chest pain, SOB, and purulent drainage from the incision.     O:  Vitals:    08/07/20 1104   BP: 136/82   Pulse: 85   Temp: 98.4 °F (36.9 °C)   Weight: 90.3 kg (199 lb 1.2 oz)   Height: 5' 9" (1.753 m)   PainSc:   9       Physical Exam:  Gen: WDWN, NAD  HEENT: Neck incision with small area of dehiscence, mild erythema, no fluctuance  Cards: RRR, intact distal pulses  Pulm: Normal rate, not in respiratory distress  Neuro: AAO x 3    Path: Epidermoid Inclusion Cyst    A: Doing well.    P: Suture removed in clinic. Return to clinic in couple weeks to eval wound    Cam Adrian MD  Surgery, PGYIII  [phone]    "

## 2020-09-20 ENCOUNTER — TELEPHONE (OUTPATIENT)
Dept: NEUROLOGY | Facility: CLINIC | Age: 60
End: 2020-09-20

## 2020-09-20 NOTE — TELEPHONE ENCOUNTER
Pt not available until after 3:00pm per SO. I advised SO that his EMG appt would need to be cancelled and r/s for an alternate date/time. I asked to have him contact the clinic tomorrow. His SO acknowledged these directions.

## 2020-09-29 ENCOUNTER — PATIENT MESSAGE (OUTPATIENT)
Dept: INTERNAL MEDICINE | Facility: CLINIC | Age: 60
End: 2020-09-29

## 2020-11-03 ENCOUNTER — PATIENT MESSAGE (OUTPATIENT)
Dept: INTERNAL MEDICINE | Facility: CLINIC | Age: 60
End: 2020-11-03

## 2020-11-03 ENCOUNTER — TELEPHONE (OUTPATIENT)
Dept: INTERNAL MEDICINE | Facility: CLINIC | Age: 60
End: 2020-11-03

## 2020-11-03 DIAGNOSIS — R35.0 URINARY FREQUENCY: ICD-10-CM

## 2020-11-03 DIAGNOSIS — R63.4 WEIGHT LOSS: Primary | ICD-10-CM

## 2020-11-03 DIAGNOSIS — Z12.5 PROSTATE CANCER SCREENING: ICD-10-CM

## 2020-11-03 NOTE — TELEPHONE ENCOUNTER
Please call patient's wife to see if the symptoms reported in the past of weight loss and change in bathroom habits are persisting. Is he having urinary frequency or bowel frequency? I would also like to check labs and urine; Please schedule this appointment with the patient or provide the number for the scheduling desk.

## 2020-11-03 NOTE — TELEPHONE ENCOUNTER
I tried calling both numbers [phone] and [phone]. There was no answer and I was unable to leave a message. The wife ordinally sent this message through the Portal so I will send Dr Cason's message back through he Portal.

## 2020-12-03 ENCOUNTER — OFFICE VISIT (OUTPATIENT)
Dept: PSYCHIATRY | Facility: CLINIC | Age: 60
End: 2020-12-03
Payer: MEDICARE

## 2020-12-03 VITALS
SYSTOLIC BLOOD PRESSURE: 148 MMHG | HEART RATE: 74 BPM | DIASTOLIC BLOOD PRESSURE: 94 MMHG | BODY MASS INDEX: 29.58 KG/M2 | WEIGHT: 200.31 LBS

## 2020-12-03 DIAGNOSIS — F39 MOOD DISORDER: ICD-10-CM

## 2020-12-03 PROCEDURE — 99999 PR PBB SHADOW E&M-EST. PATIENT-LVL I: CPT | Mod: PBBFAC,HCNC,, | Performed by: PSYCHIATRY & NEUROLOGY

## 2020-12-03 PROCEDURE — 99499 RISK ADDL DX/OHS AUDIT: ICD-10-PCS | Mod: S$GLB,,, | Performed by: PSYCHIATRY & NEUROLOGY

## 2020-12-03 PROCEDURE — 99999 PR PBB SHADOW E&M-EST. PATIENT-LVL I: ICD-10-PCS | Mod: PBBFAC,HCNC,, | Performed by: PSYCHIATRY & NEUROLOGY

## 2020-12-03 PROCEDURE — 3008F BODY MASS INDEX DOCD: CPT | Mod: HCNC,CPTII,S$GLB, | Performed by: PSYCHIATRY & NEUROLOGY

## 2020-12-03 PROCEDURE — 99214 PR OFFICE/OUTPT VISIT, EST, LEVL IV, 30-39 MIN: ICD-10-PCS | Mod: HCNC,S$GLB,, | Performed by: PSYCHIATRY & NEUROLOGY

## 2020-12-03 PROCEDURE — 3077F PR MOST RECENT SYSTOLIC BLOOD PRESSURE >= 140 MM HG: ICD-10-PCS | Mod: HCNC,CPTII,S$GLB, | Performed by: PSYCHIATRY & NEUROLOGY

## 2020-12-03 PROCEDURE — 3008F PR BODY MASS INDEX (BMI) DOCUMENTED: ICD-10-PCS | Mod: HCNC,CPTII,S$GLB, | Performed by: PSYCHIATRY & NEUROLOGY

## 2020-12-03 PROCEDURE — 3080F DIAST BP >= 90 MM HG: CPT | Mod: HCNC,CPTII,S$GLB, | Performed by: PSYCHIATRY & NEUROLOGY

## 2020-12-03 PROCEDURE — 99214 OFFICE O/P EST MOD 30 MIN: CPT | Mod: HCNC,S$GLB,, | Performed by: PSYCHIATRY & NEUROLOGY

## 2020-12-03 PROCEDURE — 99499 UNLISTED E&M SERVICE: CPT | Mod: S$GLB,,, | Performed by: PSYCHIATRY & NEUROLOGY

## 2020-12-03 PROCEDURE — 3080F PR MOST RECENT DIASTOLIC BLOOD PRESSURE >= 90 MM HG: ICD-10-PCS | Mod: HCNC,CPTII,S$GLB, | Performed by: PSYCHIATRY & NEUROLOGY

## 2020-12-03 PROCEDURE — 3077F SYST BP >= 140 MM HG: CPT | Mod: HCNC,CPTII,S$GLB, | Performed by: PSYCHIATRY & NEUROLOGY

## 2020-12-03 RX ORDER — FOLIC ACID-PYRIDOXINE-CYANOCOBALAMIN TAB 2.5-25-2 MG 2.5-25-2 MG
1 TAB ORAL DAILY
Qty: 90 TABLET | Refills: 0 | Status: SHIPPED | OUTPATIENT
Start: 2020-12-03 | End: 2021-01-01

## 2020-12-03 RX ORDER — LAMOTRIGINE 200 MG/1
TABLET ORAL
Qty: 90 TABLET | Refills: 1 | Status: SHIPPED | OUTPATIENT
Start: 2020-12-03 | End: 2021-03-03 | Stop reason: SDUPTHER

## 2020-12-03 RX ORDER — SERTRALINE HYDROCHLORIDE 100 MG/1
100 TABLET, FILM COATED ORAL DAILY
Qty: 90 TABLET | Refills: 1 | Status: SHIPPED | OUTPATIENT
Start: 2020-12-03 | End: 2021-03-03 | Stop reason: SDUPTHER

## 2020-12-03 RX ORDER — RISPERIDONE 1 MG/1
1 TABLET ORAL NIGHTLY
Qty: 90 TABLET | Refills: 1 | Status: SHIPPED | OUTPATIENT
Start: 2020-12-03 | End: 2021-03-03 | Stop reason: SDUPTHER

## 2020-12-03 NOTE — PROGRESS NOTES
ESTABLISHED OUTPATIENT VISIT   E/M LEVEL 4: [id number]    ENCOUNTER DATE: 12/3/2020  SITE: [facility], [address]    HISTORY    CHIEF COMPLAINT   Roni Espinoza is a 59 y.o. male who presents for follow up of Cognitive d/o N.O.S., Mood d/o N.O.S., Psychosis N.O.S.  .    HPI     Came to today's visit alone via bus.    No EPS noted on exam today.    Denies recent seizure.    No AH's. No SI.    Overall appears to be doing reasonably well.    Watches movies, e.g. Perfect Audience movies..    At times socializes with neighbor.    Psychiatric Review Of Systems - Is patient experiencing or having changes in:  sleep: adequate  appetite: no  Weight: no  energy/anergy: no  interest/pleasure/anhedonia: no  somatic symptoms: no  libido: no  anxiety/panic: no  guilty/hopelessness: no  concentration: no  S.I.B.s/risky behavior: no  Irritability: no  Racing thoughts: no  Impulsive behaviors: no  Paranoia:no  AVH:no    Recent alcohol: some wine recently on one occasion  Recent drug: no    Medical ROS   knee pain     PAST MEDICAL, FAMILY AND SOCIAL HISTORY: The patient's past medical, family and social history have been reviewed and updated as appropriate within the electronic medical record - see encounter notes.    PSYCHOTROPIC MEDICATIONS   Lamictal 200 mg qam, Zoloft 100 mg qam, Risperdal 1 mg at bedtime, Folbic at bedtime    EXAMINATION    Vitals:    12/03/20 1311   BP: (!) 148/94   Pulse: 74     BP (!) 148/94   Pulse 74   Wt 90.9 kg (200 lb 4.6 oz)   BMI 29.58 kg/m²     CONSTITUTIONAL  General Appearance: well nourished    MUSCULOSKELETAL  Muscle Strength and Tone: normal strength and tone  Abnormal Involuntary Movements: no abnormal movement noted  Gait and Station: normal gait    PSYCHIATRIC   Level of Consciousness: alert  Orientation: states, that today is December 2nd 2020, knows today is Thursday  Grooming: well groomed  Psychomotor Behavior: no restlessness/agitation  Speech: normal in rate, rhythm and  volume  Language: normal vocabulary  Mood: steady  Affect: appropriate  Thought Process: some disorganization  Associations: intact associations  Thought Content: no SI/HI  Memory: grossly intact  Attention: intact to content of interview  Fund of Knowledge: appears adequate  Insight: good  Judgement: good    MEDICAL DECISION MAKING    DIAGNOSES  Cognitive d/o N.O.S., Depressive d/o unspecified, Psychosis N.O.S.    PROBLEM LIST AND MANAGEMENT PLANS    - depressive d/o, psychosis: continue above meds  - rtc 3 months     Time with patient: 20 min    LABORATORY DATA  No visits with results within 3 Month(s) from this visit.   Latest known visit with results is:   Procedure visit on 07/24/2020   Component Date Value Ref Range Status    Final Pathologic Diagnosis 07/24/2020    Final                    Value:[facility] DIAGNOSIS:  POSTERIOR NECK, BIOPSY:  - Epidermal inclusion cyst.  Janis Cee M.D.  Report attached.    Performing site:  [place] [address]      Interpreted by: Balwinder Adler M.D., Signed on 07/30/2020 at 09:04           Vinnie Cotter

## 2020-12-11 ENCOUNTER — PATIENT MESSAGE (OUTPATIENT)
Dept: OTHER | Facility: OTHER | Age: 60
End: 2020-12-11

## 2020-12-31 ENCOUNTER — TELEPHONE (OUTPATIENT)
Dept: PRIMARY CARE CLINIC | Facility: CLINIC | Age: 60
End: 2020-12-31

## 2020-12-31 NOTE — TELEPHONE ENCOUNTER
Spoke with pt wife, Ms. Rodriguez. She stated that her  would like to be seen in this clinic.  He has [employer].    Please advise.

## 2021-01-04 NOTE — TELEPHONE ENCOUNTER
Dr Cason- Your patient has a low hospital readmission risk score but has a complex medical profile, and psychiatric issues. He also has ACO plan of [employer]. He is appropriate for a referral to [facility].     If you are in agreement with transferring this patient to [facility], could you reach out to family to explain that you recommend the clinic, and describe to them why the services of the clinic would benefit their loved one (care coordination, home visits, mobile NPs, addressing barriers to care)? If they agree, please place a referral to [facility] ([id number]).     Thank you,  Yareli Hui MD/MPH  [facility]  [phone] [email or web address]

## 2021-01-04 NOTE — TELEPHONE ENCOUNTER
Please contact patient and family notifying them that I am recommending ongoing primary care with the [facility].  It looks like they were already contacted but if they have additional questions you can relay the information below along with your own knowledge of the clinic. Thanks!    After talking with them if they are in agreement, let them know I'll be placing the referral and someone will call them to get set up for an appointment. Send me the message with the pended [facility] referral ([id number]).    [facility]: The clinic is designed for patients with many chronic conditions.  The providers have a smaller patient panel, and are able to spend more time on illness management and coordination of care.  There is no extra cost associated with being in the program, and the copays are the same standard amount for all routine Internal Medicine visits at [facility].  The doctor in the [facility] program will take over as your primary care provider instead of me, but I will make sure to update him/her with all your important medical information.

## 2021-01-04 NOTE — TELEPHONE ENCOUNTER
Spoke with pt's wife, Ms. Rodriguez. They wanted to see Dr. Hui  I explained that Dr. Hui's panel is very full and is now utilizing an NP.     Pt will remain with Dr. Cason for the time being.

## 2021-01-05 NOTE — TELEPHONE ENCOUNTER
Please get patient scheduled with me, I am taking new patients and he fits the model of the clinic.    Thanks,  KJ

## 2021-02-09 ENCOUNTER — PES CALL (OUTPATIENT)
Dept: ADMINISTRATIVE | Facility: CLINIC | Age: 61
End: 2021-02-09

## 2021-02-19 ENCOUNTER — PES CALL (OUTPATIENT)
Dept: ADMINISTRATIVE | Facility: CLINIC | Age: 61
End: 2021-02-19

## 2021-02-20 ENCOUNTER — PATIENT MESSAGE (OUTPATIENT)
Dept: NEUROSURGERY | Facility: CLINIC | Age: 61
End: 2021-02-20

## 2021-03-03 ENCOUNTER — OFFICE VISIT (OUTPATIENT)
Dept: PSYCHIATRY | Facility: CLINIC | Age: 61
End: 2021-03-03
Payer: MEDICARE

## 2021-03-03 VITALS
DIASTOLIC BLOOD PRESSURE: 77 MMHG | SYSTOLIC BLOOD PRESSURE: 136 MMHG | HEART RATE: 84 BPM | WEIGHT: 201.63 LBS | BODY MASS INDEX: 29.77 KG/M2

## 2021-03-03 DIAGNOSIS — F39 MOOD DISORDER: ICD-10-CM

## 2021-03-03 DIAGNOSIS — R20.2 PARESTHESIA OF SKIN: ICD-10-CM

## 2021-03-03 PROCEDURE — 99499 UNLISTED E&M SERVICE: CPT | Mod: S$GLB,,, | Performed by: PSYCHIATRY & NEUROLOGY

## 2021-03-03 PROCEDURE — 3008F BODY MASS INDEX DOCD: CPT | Mod: CPTII,S$GLB,, | Performed by: PSYCHIATRY & NEUROLOGY

## 2021-03-03 PROCEDURE — 3008F PR BODY MASS INDEX (BMI) DOCUMENTED: ICD-10-PCS | Mod: CPTII,S$GLB,, | Performed by: PSYCHIATRY & NEUROLOGY

## 2021-03-03 PROCEDURE — 99499 RISK ADDL DX/OHS AUDIT: ICD-10-PCS | Mod: S$GLB,,, | Performed by: PSYCHIATRY & NEUROLOGY

## 2021-03-03 PROCEDURE — 3078F DIAST BP <80 MM HG: CPT | Mod: CPTII,S$GLB,, | Performed by: PSYCHIATRY & NEUROLOGY

## 2021-03-03 PROCEDURE — 3075F PR MOST RECENT SYSTOLIC BLOOD PRESS GE 130-139MM HG: ICD-10-PCS | Mod: CPTII,S$GLB,, | Performed by: PSYCHIATRY & NEUROLOGY

## 2021-03-03 PROCEDURE — 99999 PR PBB SHADOW E&M-EST. PATIENT-LVL I: CPT | Mod: PBBFAC,,, | Performed by: PSYCHIATRY & NEUROLOGY

## 2021-03-03 PROCEDURE — 99999 PR PBB SHADOW E&M-EST. PATIENT-LVL I: ICD-10-PCS | Mod: PBBFAC,,, | Performed by: PSYCHIATRY & NEUROLOGY

## 2021-03-03 PROCEDURE — 99214 PR OFFICE/OUTPT VISIT, EST, LEVL IV, 30-39 MIN: ICD-10-PCS | Mod: S$GLB,,, | Performed by: PSYCHIATRY & NEUROLOGY

## 2021-03-03 PROCEDURE — 3078F PR MOST RECENT DIASTOLIC BLOOD PRESSURE < 80 MM HG: ICD-10-PCS | Mod: CPTII,S$GLB,, | Performed by: PSYCHIATRY & NEUROLOGY

## 2021-03-03 PROCEDURE — 3075F SYST BP GE 130 - 139MM HG: CPT | Mod: CPTII,S$GLB,, | Performed by: PSYCHIATRY & NEUROLOGY

## 2021-03-03 PROCEDURE — 99214 OFFICE O/P EST MOD 30 MIN: CPT | Mod: S$GLB,,, | Performed by: PSYCHIATRY & NEUROLOGY

## 2021-03-03 RX ORDER — LAMOTRIGINE 200 MG/1
TABLET ORAL
Qty: 90 TABLET | Refills: 1 | Status: SHIPPED | OUTPATIENT
Start: 2021-03-03 | End: 2021-08-26 | Stop reason: SDUPTHER

## 2021-03-03 RX ORDER — FOLIC ACID-PYRIDOXINE-CYANOCOBALAMIN TAB 2.5-25-2 MG 2.5-25-2 MG
1 TAB ORAL DAILY
Qty: 90 TABLET | Refills: 0 | Status: SHIPPED | OUTPATIENT
Start: 2021-03-03 | End: 2021-05-19 | Stop reason: SDUPTHER

## 2021-03-03 RX ORDER — HYDROXYZINE PAMOATE 25 MG/1
CAPSULE ORAL
Qty: 60 CAPSULE | Refills: 0 | Status: SHIPPED | OUTPATIENT
Start: 2021-03-03 | End: 2021-05-19 | Stop reason: SDUPTHER

## 2021-03-03 RX ORDER — SERTRALINE HYDROCHLORIDE 100 MG/1
100 TABLET, FILM COATED ORAL DAILY
Qty: 90 TABLET | Refills: 1 | Status: SHIPPED | OUTPATIENT
Start: 2021-03-03 | End: 2021-08-26 | Stop reason: SDUPTHER

## 2021-03-03 RX ORDER — RISPERIDONE 1 MG/1
1 TABLET ORAL NIGHTLY
Qty: 90 TABLET | Refills: 1 | Status: SHIPPED | OUTPATIENT
Start: 2021-03-03 | End: 2021-11-30

## 2021-05-19 ENCOUNTER — OFFICE VISIT (OUTPATIENT)
Dept: PSYCHIATRY | Facility: CLINIC | Age: 61
End: 2021-05-19
Payer: MEDICARE

## 2021-05-19 VITALS
SYSTOLIC BLOOD PRESSURE: 124 MMHG | HEART RATE: 92 BPM | DIASTOLIC BLOOD PRESSURE: 90 MMHG | BODY MASS INDEX: 29.16 KG/M2 | WEIGHT: 196.88 LBS | HEIGHT: 69 IN

## 2021-05-19 DIAGNOSIS — R41.89 COGNITIVE CHANGE: Primary | ICD-10-CM

## 2021-05-19 PROCEDURE — 3008F BODY MASS INDEX DOCD: CPT | Mod: CPTII,S$GLB,, | Performed by: PSYCHIATRY & NEUROLOGY

## 2021-05-19 PROCEDURE — 99999 PR PBB SHADOW E&M-EST. PATIENT-LVL II: ICD-10-PCS | Mod: PBBFAC,,, | Performed by: PSYCHIATRY & NEUROLOGY

## 2021-05-19 PROCEDURE — 99214 PR OFFICE/OUTPT VISIT, EST, LEVL IV, 30-39 MIN: ICD-10-PCS | Mod: S$GLB,,, | Performed by: PSYCHIATRY & NEUROLOGY

## 2021-05-19 PROCEDURE — 99214 OFFICE O/P EST MOD 30 MIN: CPT | Mod: S$GLB,,, | Performed by: PSYCHIATRY & NEUROLOGY

## 2021-05-19 PROCEDURE — 99999 PR PBB SHADOW E&M-EST. PATIENT-LVL II: CPT | Mod: PBBFAC,,, | Performed by: PSYCHIATRY & NEUROLOGY

## 2021-05-19 PROCEDURE — 3008F PR BODY MASS INDEX (BMI) DOCUMENTED: ICD-10-PCS | Mod: CPTII,S$GLB,, | Performed by: PSYCHIATRY & NEUROLOGY

## 2021-05-19 RX ORDER — HYDROXYZINE PAMOATE 25 MG/1
CAPSULE ORAL
Qty: 180 CAPSULE | Refills: 0 | Status: SHIPPED | OUTPATIENT
Start: 2021-05-19 | End: 2021-09-09

## 2021-05-19 RX ORDER — FOLIC ACID-PYRIDOXINE-CYANOCOBALAMIN TAB 2.5-25-2 MG 2.5-25-2 MG
1 TAB ORAL DAILY
Qty: 90 TABLET | Refills: 0 | Status: SHIPPED | OUTPATIENT
Start: 2021-05-19 | End: 2022-01-06

## 2021-05-24 ENCOUNTER — TELEPHONE (OUTPATIENT)
Dept: INTERNAL MEDICINE | Facility: CLINIC | Age: 61
End: 2021-05-24

## 2021-06-03 ENCOUNTER — TELEPHONE (OUTPATIENT)
Dept: INTERNAL MEDICINE | Facility: CLINIC | Age: 61
End: 2021-06-03

## 2021-06-04 ENCOUNTER — OFFICE VISIT (OUTPATIENT)
Dept: INTERNAL MEDICINE | Facility: CLINIC | Age: 61
End: 2021-06-04
Payer: MEDICARE

## 2021-06-04 ENCOUNTER — LAB VISIT (OUTPATIENT)
Dept: LAB | Facility: HOSPITAL | Age: 61
End: 2021-06-04
Attending: INTERNAL MEDICINE
Payer: MEDICARE

## 2021-06-04 VITALS
DIASTOLIC BLOOD PRESSURE: 84 MMHG | SYSTOLIC BLOOD PRESSURE: 122 MMHG | WEIGHT: 197.56 LBS | HEIGHT: 69 IN | OXYGEN SATURATION: 99 % | BODY MASS INDEX: 29.26 KG/M2 | HEART RATE: 100 BPM

## 2021-06-04 DIAGNOSIS — R55 SYNCOPE, UNSPECIFIED SYNCOPE TYPE: Primary | ICD-10-CM

## 2021-06-04 DIAGNOSIS — R55 SYNCOPE, UNSPECIFIED SYNCOPE TYPE: ICD-10-CM

## 2021-06-04 DIAGNOSIS — G89.29 CHRONIC MIDLINE LOW BACK PAIN WITHOUT SCIATICA: ICD-10-CM

## 2021-06-04 DIAGNOSIS — M54.50 CHRONIC MIDLINE LOW BACK PAIN WITHOUT SCIATICA: ICD-10-CM

## 2021-06-04 DIAGNOSIS — R63.4 WEIGHT LOSS: ICD-10-CM

## 2021-06-04 LAB
ALBUMIN SERPL BCP-MCNC: 4 G/DL (ref 3.5–5.2)
ALP SERPL-CCNC: 118 U/L (ref 55–135)
ALT SERPL W/O P-5'-P-CCNC: 24 U/L (ref 10–44)
ANION GAP SERPL CALC-SCNC: 9 MMOL/L (ref 8–16)
AST SERPL-CCNC: 30 U/L (ref 10–40)
BASOPHILS # BLD AUTO: 0.02 K/UL (ref 0–0.2)
BASOPHILS NFR BLD: 0.3 % (ref 0–1.9)
BILIRUB SERPL-MCNC: 0.5 MG/DL (ref 0.1–1)
BUN SERPL-MCNC: 11 MG/DL (ref 8–23)
CALCIUM SERPL-MCNC: 9.3 MG/DL (ref 8.7–10.5)
CHLORIDE SERPL-SCNC: 107 MMOL/L (ref 95–110)
CO2 SERPL-SCNC: 25 MMOL/L (ref 23–29)
CREAT SERPL-MCNC: 1.1 MG/DL (ref 0.5–1.4)
DIFFERENTIAL METHOD: ABNORMAL
EOSINOPHIL # BLD AUTO: 0.1 K/UL (ref 0–0.5)
EOSINOPHIL NFR BLD: 1.3 % (ref 0–8)
ERYTHROCYTE [DISTWIDTH] IN BLOOD BY AUTOMATED COUNT: 14.4 % (ref 11.5–14.5)
EST. GFR  (AFRICAN AMERICAN): >60 ML/MIN/1.73 M^2
EST. GFR  (NON AFRICAN AMERICAN): >60 ML/MIN/1.73 M^2
GLUCOSE SERPL-MCNC: 87 MG/DL (ref 70–110)
HCT VFR BLD AUTO: 43.1 % (ref 40–54)
HGB BLD-MCNC: 13.9 G/DL (ref 14–18)
IMM GRANULOCYTES # BLD AUTO: 0.02 K/UL (ref 0–0.04)
IMM GRANULOCYTES NFR BLD AUTO: 0.3 % (ref 0–0.5)
LYMPHOCYTES # BLD AUTO: 1.3 K/UL (ref 1–4.8)
LYMPHOCYTES NFR BLD: 18.7 % (ref 18–48)
MCH RBC QN AUTO: 29.9 PG (ref 27–31)
MCHC RBC AUTO-ENTMCNC: 32.3 G/DL (ref 32–36)
MCV RBC AUTO: 93 FL (ref 82–98)
MONOCYTES # BLD AUTO: 0.8 K/UL (ref 0.3–1)
MONOCYTES NFR BLD: 11.4 % (ref 4–15)
NEUTROPHILS # BLD AUTO: 4.7 K/UL (ref 1.8–7.7)
NEUTROPHILS NFR BLD: 68 % (ref 38–73)
NRBC BLD-RTO: 0 /100 WBC
PLATELET # BLD AUTO: 202 K/UL (ref 150–450)
PMV BLD AUTO: 11 FL (ref 9.2–12.9)
POTASSIUM SERPL-SCNC: 4 MMOL/L (ref 3.5–5.1)
PROT SERPL-MCNC: 7.5 G/DL (ref 6–8.4)
RBC # BLD AUTO: 4.65 M/UL (ref 4.6–6.2)
SODIUM SERPL-SCNC: 141 MMOL/L (ref 136–145)
T4 SERPL-MCNC: 6.4 UG/DL (ref 4.5–11.5)
TSH SERPL DL<=0.005 MIU/L-ACNC: 0.97 UIU/ML (ref 0.4–4)
WBC # BLD AUTO: 6.91 K/UL (ref 3.9–12.7)

## 2021-06-04 PROCEDURE — 93005 ELECTROCARDIOGRAM TRACING: CPT | Mod: S$GLB,,, | Performed by: INTERNAL MEDICINE

## 2021-06-04 PROCEDURE — 3008F PR BODY MASS INDEX (BMI) DOCUMENTED: ICD-10-PCS | Mod: CPTII,S$GLB,, | Performed by: INTERNAL MEDICINE

## 2021-06-04 PROCEDURE — 99214 PR OFFICE/OUTPT VISIT, EST, LEVL IV, 30-39 MIN: ICD-10-PCS | Mod: S$GLB,,, | Performed by: INTERNAL MEDICINE

## 2021-06-04 PROCEDURE — 3008F BODY MASS INDEX DOCD: CPT | Mod: CPTII,S$GLB,, | Performed by: INTERNAL MEDICINE

## 2021-06-04 PROCEDURE — 84443 ASSAY THYROID STIM HORMONE: CPT | Performed by: INTERNAL MEDICINE

## 2021-06-04 PROCEDURE — 80053 COMPREHEN METABOLIC PANEL: CPT | Performed by: INTERNAL MEDICINE

## 2021-06-04 PROCEDURE — 85025 COMPLETE CBC W/AUTO DIFF WBC: CPT | Performed by: INTERNAL MEDICINE

## 2021-06-04 PROCEDURE — 1125F PR PAIN SEVERITY QUANTIFIED, PAIN PRESENT: ICD-10-PCS | Mod: S$GLB,,, | Performed by: INTERNAL MEDICINE

## 2021-06-04 PROCEDURE — 1125F AMNT PAIN NOTED PAIN PRSNT: CPT | Mod: S$GLB,,, | Performed by: INTERNAL MEDICINE

## 2021-06-04 PROCEDURE — 99214 OFFICE O/P EST MOD 30 MIN: CPT | Mod: S$GLB,,, | Performed by: INTERNAL MEDICINE

## 2021-06-04 PROCEDURE — 93010 EKG 12-LEAD: ICD-10-PCS | Mod: S$GLB,,, | Performed by: INTERNAL MEDICINE

## 2021-06-04 PROCEDURE — 84436 ASSAY OF TOTAL THYROXINE: CPT | Performed by: INTERNAL MEDICINE

## 2021-06-04 PROCEDURE — 93005 EKG 12-LEAD: ICD-10-PCS | Mod: S$GLB,,, | Performed by: INTERNAL MEDICINE

## 2021-06-04 PROCEDURE — 99999 PR PBB SHADOW E&M-EST. PATIENT-LVL IV: ICD-10-PCS | Mod: PBBFAC,,, | Performed by: INTERNAL MEDICINE

## 2021-06-04 PROCEDURE — 99999 PR PBB SHADOW E&M-EST. PATIENT-LVL IV: CPT | Mod: PBBFAC,,, | Performed by: INTERNAL MEDICINE

## 2021-06-04 PROCEDURE — 93010 ELECTROCARDIOGRAM REPORT: CPT | Mod: S$GLB,,, | Performed by: INTERNAL MEDICINE

## 2021-06-04 PROCEDURE — 36415 COLL VENOUS BLD VENIPUNCTURE: CPT | Performed by: INTERNAL MEDICINE

## 2021-06-05 ENCOUNTER — PATIENT MESSAGE (OUTPATIENT)
Dept: PSYCHIATRY | Facility: CLINIC | Age: 61
End: 2021-06-05

## 2021-06-10 ENCOUNTER — PATIENT MESSAGE (OUTPATIENT)
Dept: ORTHOPEDICS | Facility: CLINIC | Age: 61
End: 2021-06-10

## 2021-06-14 ENCOUNTER — OFFICE VISIT (OUTPATIENT)
Dept: ORTHOPEDICS | Facility: CLINIC | Age: 61
End: 2021-06-14
Payer: MEDICARE

## 2021-06-14 ENCOUNTER — CLINICAL SUPPORT (OUTPATIENT)
Dept: CARDIOLOGY | Facility: HOSPITAL | Age: 61
End: 2021-06-14
Attending: INTERNAL MEDICINE
Payer: MEDICARE

## 2021-06-14 ENCOUNTER — HOSPITAL ENCOUNTER (OUTPATIENT)
Dept: RADIOLOGY | Facility: HOSPITAL | Age: 61
Discharge: HOME | End: 2021-06-14
Attending: ORTHOPAEDIC SURGERY
Payer: MEDICARE

## 2021-06-14 ENCOUNTER — HOSPITAL ENCOUNTER (OUTPATIENT)
Dept: CARDIOLOGY | Facility: CLINIC | Age: 61
Discharge: HOME | End: 2021-06-14
Attending: INTERNAL MEDICINE
Payer: MEDICARE

## 2021-06-14 VITALS — HEIGHT: 69 IN | WEIGHT: 199.88 LBS | BODY MASS INDEX: 29.6 KG/M2

## 2021-06-14 DIAGNOSIS — M51.36 DDD (DEGENERATIVE DISC DISEASE), LUMBAR: ICD-10-CM

## 2021-06-14 DIAGNOSIS — R55 SYNCOPE, UNSPECIFIED SYNCOPE TYPE: ICD-10-CM

## 2021-06-14 DIAGNOSIS — G89.29 CHRONIC MIDLINE LOW BACK PAIN WITHOUT SCIATICA: ICD-10-CM

## 2021-06-14 DIAGNOSIS — R55 SYNCOPE, UNSPECIFIED SYNCOPE TYPE: Primary | ICD-10-CM

## 2021-06-14 DIAGNOSIS — M54.50 CHRONIC MIDLINE LOW BACK PAIN WITHOUT SCIATICA: ICD-10-CM

## 2021-06-14 PROCEDURE — 99214 PR OFFICE/OUTPT VISIT, EST, LEVL IV, 30-39 MIN: ICD-10-PCS | Mod: S$GLB,,, | Performed by: ORTHOPAEDIC SURGERY

## 2021-06-14 PROCEDURE — 72110 X-RAY EXAM L-2 SPINE 4/>VWS: CPT | Mod: 26,,, | Performed by: RADIOLOGY

## 2021-06-14 PROCEDURE — 99214 OFFICE O/P EST MOD 30 MIN: CPT | Mod: S$GLB,,, | Performed by: ORTHOPAEDIC SURGERY

## 2021-06-14 PROCEDURE — 93226 XTRNL ECG REC<48 HR SCAN A/R: CPT

## 2021-06-14 PROCEDURE — 93000 ELECTROCARDIOGRAM COMPLETE: CPT | Mod: S$GLB,,, | Performed by: INTERNAL MEDICINE

## 2021-06-14 PROCEDURE — 1125F AMNT PAIN NOTED PAIN PRSNT: CPT | Mod: S$GLB,,, | Performed by: ORTHOPAEDIC SURGERY

## 2021-06-14 PROCEDURE — 72110 XR LUMBAR SPINE AP AND LAT WITH FLEX/EXT: ICD-10-PCS | Mod: 26,,, | Performed by: RADIOLOGY

## 2021-06-14 PROCEDURE — 93000 EKG 12-LEAD: ICD-10-PCS | Mod: S$GLB,,, | Performed by: INTERNAL MEDICINE

## 2021-06-14 PROCEDURE — 99999 PR PBB SHADOW E&M-EST. PATIENT-LVL III: CPT | Mod: PBBFAC,,, | Performed by: ORTHOPAEDIC SURGERY

## 2021-06-14 PROCEDURE — 93227 XTRNL ECG REC<48 HR R&I: CPT | Mod: ,,, | Performed by: STUDENT IN AN ORGANIZED HEALTH CARE EDUCATION/TRAINING PROGRAM

## 2021-06-14 PROCEDURE — 72110 X-RAY EXAM L-2 SPINE 4/>VWS: CPT | Mod: TC

## 2021-06-14 PROCEDURE — 99999 PR PBB SHADOW E&M-EST. PATIENT-LVL III: ICD-10-PCS | Mod: PBBFAC,,, | Performed by: ORTHOPAEDIC SURGERY

## 2021-06-14 PROCEDURE — 3008F PR BODY MASS INDEX (BMI) DOCUMENTED: ICD-10-PCS | Mod: CPTII,S$GLB,, | Performed by: ORTHOPAEDIC SURGERY

## 2021-06-14 PROCEDURE — 3008F BODY MASS INDEX DOCD: CPT | Mod: CPTII,S$GLB,, | Performed by: ORTHOPAEDIC SURGERY

## 2021-06-14 PROCEDURE — 93227 HOLTER MONITOR - 48 HOUR (CUPID ONLY): ICD-10-PCS | Mod: ,,, | Performed by: STUDENT IN AN ORGANIZED HEALTH CARE EDUCATION/TRAINING PROGRAM

## 2021-06-14 PROCEDURE — 1125F PR PAIN SEVERITY QUANTIFIED, PAIN PRESENT: ICD-10-PCS | Mod: S$GLB,,, | Performed by: ORTHOPAEDIC SURGERY

## 2021-06-14 RX ORDER — MELOXICAM 15 MG/1
15 TABLET ORAL DAILY
Qty: 30 TABLET | Refills: 0 | Status: SHIPPED | OUTPATIENT
Start: 2021-06-14 | End: 2021-06-14

## 2021-06-21 ENCOUNTER — PES CALL (OUTPATIENT)
Dept: ADMINISTRATIVE | Facility: CLINIC | Age: 61
End: 2021-06-21

## 2021-06-23 ENCOUNTER — TELEPHONE (OUTPATIENT)
Dept: CARDIOLOGY | Facility: HOSPITAL | Age: 61
End: 2021-06-23

## 2021-06-24 LAB
OHS CV EVENT MONITOR DAY: 0
OHS CV HOLTER LENGTH DECIMAL HOURS: 48
OHS CV HOLTER LENGTH HOURS: 48
OHS CV HOLTER LENGTH MINUTES: 0

## 2021-07-07 ENCOUNTER — PATIENT MESSAGE (OUTPATIENT)
Dept: PSYCHIATRY | Facility: CLINIC | Age: 61
End: 2021-07-07

## 2021-07-08 ENCOUNTER — PATIENT MESSAGE (OUTPATIENT)
Dept: PSYCHIATRY | Facility: CLINIC | Age: 61
End: 2021-07-08

## 2021-07-08 ENCOUNTER — CLINICAL SUPPORT (OUTPATIENT)
Dept: REHABILITATION | Facility: OTHER | Age: 61
End: 2021-07-08
Payer: MEDICARE

## 2021-07-08 ENCOUNTER — PATIENT MESSAGE (OUTPATIENT)
Dept: NEUROSURGERY | Facility: CLINIC | Age: 61
End: 2021-07-08

## 2021-07-08 DIAGNOSIS — G89.29 CHRONIC LEFT-SIDED LOW BACK PAIN WITHOUT SCIATICA: ICD-10-CM

## 2021-07-08 DIAGNOSIS — M54.50 CHRONIC LEFT-SIDED LOW BACK PAIN WITHOUT SCIATICA: ICD-10-CM

## 2021-07-08 DIAGNOSIS — M53.86 DECREASED RANGE OF MOTION OF LUMBAR SPINE: ICD-10-CM

## 2021-07-08 PROCEDURE — 97162 PT EVAL MOD COMPLEX 30 MIN: CPT | Mod: PN | Performed by: PHYSICAL THERAPIST

## 2021-07-09 PROBLEM — G89.29 CHRONIC LEFT-SIDED LOW BACK PAIN WITHOUT SCIATICA: Status: ACTIVE | Noted: 2021-07-09

## 2021-07-09 PROBLEM — M53.86 DECREASED RANGE OF MOTION OF LUMBAR SPINE: Status: ACTIVE | Noted: 2021-07-09

## 2021-07-09 PROBLEM — M54.50 CHRONIC LEFT-SIDED LOW BACK PAIN WITHOUT SCIATICA: Status: ACTIVE | Noted: 2021-07-09

## 2021-08-04 ENCOUNTER — CLINICAL SUPPORT (OUTPATIENT)
Dept: REHABILITATION | Facility: OTHER | Age: 61
End: 2021-08-04
Payer: MEDICARE

## 2021-08-04 DIAGNOSIS — M53.86 DECREASED RANGE OF MOTION OF LUMBAR SPINE: ICD-10-CM

## 2021-08-04 DIAGNOSIS — M54.50 CHRONIC LEFT-SIDED LOW BACK PAIN WITHOUT SCIATICA: Primary | ICD-10-CM

## 2021-08-04 DIAGNOSIS — G89.29 CHRONIC LEFT-SIDED LOW BACK PAIN WITHOUT SCIATICA: Primary | ICD-10-CM

## 2021-08-04 PROCEDURE — 97530 THERAPEUTIC ACTIVITIES: CPT | Mod: PN

## 2021-08-04 PROCEDURE — 97110 THERAPEUTIC EXERCISES: CPT | Mod: PN

## 2021-08-06 ENCOUNTER — CLINICAL SUPPORT (OUTPATIENT)
Dept: REHABILITATION | Facility: OTHER | Age: 61
End: 2021-08-06
Payer: MEDICARE

## 2021-08-06 DIAGNOSIS — G89.29 CHRONIC LEFT-SIDED LOW BACK PAIN WITHOUT SCIATICA: ICD-10-CM

## 2021-08-06 DIAGNOSIS — M53.86 DECREASED RANGE OF MOTION OF LUMBAR SPINE: ICD-10-CM

## 2021-08-06 DIAGNOSIS — M54.50 CHRONIC LEFT-SIDED LOW BACK PAIN WITHOUT SCIATICA: ICD-10-CM

## 2021-08-06 PROCEDURE — 97530 THERAPEUTIC ACTIVITIES: CPT | Mod: PN,CQ

## 2021-08-06 PROCEDURE — 97110 THERAPEUTIC EXERCISES: CPT | Mod: PN,CQ

## 2021-08-11 ENCOUNTER — CLINICAL SUPPORT (OUTPATIENT)
Dept: REHABILITATION | Facility: OTHER | Age: 61
End: 2021-08-11
Payer: MEDICARE

## 2021-08-11 DIAGNOSIS — M54.50 CHRONIC LEFT-SIDED LOW BACK PAIN WITHOUT SCIATICA: ICD-10-CM

## 2021-08-11 DIAGNOSIS — G89.29 CHRONIC LEFT-SIDED LOW BACK PAIN WITHOUT SCIATICA: ICD-10-CM

## 2021-08-11 DIAGNOSIS — M53.86 DECREASED RANGE OF MOTION OF LUMBAR SPINE: ICD-10-CM

## 2021-08-11 PROCEDURE — 97110 THERAPEUTIC EXERCISES: CPT | Mod: PN,CQ

## 2021-08-11 PROCEDURE — 97530 THERAPEUTIC ACTIVITIES: CPT | Mod: PN,CQ

## 2021-08-13 ENCOUNTER — PATIENT MESSAGE (OUTPATIENT)
Dept: REHABILITATION | Facility: OTHER | Age: 61
End: 2021-08-13

## 2021-08-18 ENCOUNTER — PATIENT MESSAGE (OUTPATIENT)
Dept: REHABILITATION | Facility: OTHER | Age: 61
End: 2021-08-18

## 2021-08-20 ENCOUNTER — CLINICAL SUPPORT (OUTPATIENT)
Dept: REHABILITATION | Facility: OTHER | Age: 61
End: 2021-08-20
Payer: MEDICARE

## 2021-08-20 DIAGNOSIS — G89.29 CHRONIC LEFT-SIDED LOW BACK PAIN WITHOUT SCIATICA: Primary | ICD-10-CM

## 2021-08-20 DIAGNOSIS — M54.50 CHRONIC LEFT-SIDED LOW BACK PAIN WITHOUT SCIATICA: Primary | ICD-10-CM

## 2021-08-20 DIAGNOSIS — M53.86 DECREASED RANGE OF MOTION OF LUMBAR SPINE: ICD-10-CM

## 2021-08-20 PROCEDURE — 97530 THERAPEUTIC ACTIVITIES: CPT | Mod: PN

## 2021-08-20 PROCEDURE — 97110 THERAPEUTIC EXERCISES: CPT | Mod: PN

## 2021-08-26 ENCOUNTER — OFFICE VISIT (OUTPATIENT)
Dept: PSYCHIATRY | Facility: CLINIC | Age: 61
End: 2021-08-26
Payer: MEDICARE

## 2021-08-26 VITALS
DIASTOLIC BLOOD PRESSURE: 88 MMHG | WEIGHT: 213.31 LBS | BODY MASS INDEX: 31.5 KG/M2 | SYSTOLIC BLOOD PRESSURE: 136 MMHG | HEART RATE: 88 BPM

## 2021-08-26 DIAGNOSIS — F39 MOOD DISORDER: ICD-10-CM

## 2021-08-26 PROCEDURE — 3075F PR MOST RECENT SYSTOLIC BLOOD PRESS GE 130-139MM HG: ICD-10-PCS | Mod: CPTII,S$GLB,, | Performed by: PSYCHIATRY & NEUROLOGY

## 2021-08-26 PROCEDURE — 3079F DIAST BP 80-89 MM HG: CPT | Mod: CPTII,S$GLB,, | Performed by: PSYCHIATRY & NEUROLOGY

## 2021-08-26 PROCEDURE — 3008F PR BODY MASS INDEX (BMI) DOCUMENTED: ICD-10-PCS | Mod: CPTII,S$GLB,, | Performed by: PSYCHIATRY & NEUROLOGY

## 2021-08-26 PROCEDURE — 99999 PR PBB SHADOW E&M-EST. PATIENT-LVL I: ICD-10-PCS | Mod: PBBFAC,,, | Performed by: PSYCHIATRY & NEUROLOGY

## 2021-08-26 PROCEDURE — 99499 UNLISTED E&M SERVICE: CPT | Mod: HCNC,S$GLB,, | Performed by: PSYCHIATRY & NEUROLOGY

## 2021-08-26 PROCEDURE — 99499 RISK ADDL DX/OHS AUDIT: ICD-10-PCS | Mod: HCNC,S$GLB,, | Performed by: PSYCHIATRY & NEUROLOGY

## 2021-08-26 PROCEDURE — 3075F SYST BP GE 130 - 139MM HG: CPT | Mod: CPTII,S$GLB,, | Performed by: PSYCHIATRY & NEUROLOGY

## 2021-08-26 PROCEDURE — 99999 PR PBB SHADOW E&M-EST. PATIENT-LVL I: CPT | Mod: PBBFAC,,, | Performed by: PSYCHIATRY & NEUROLOGY

## 2021-08-26 PROCEDURE — 99214 PR OFFICE/OUTPT VISIT, EST, LEVL IV, 30-39 MIN: ICD-10-PCS | Mod: S$GLB,,, | Performed by: PSYCHIATRY & NEUROLOGY

## 2021-08-26 PROCEDURE — 99214 OFFICE O/P EST MOD 30 MIN: CPT | Mod: S$GLB,,, | Performed by: PSYCHIATRY & NEUROLOGY

## 2021-08-26 PROCEDURE — 3079F PR MOST RECENT DIASTOLIC BLOOD PRESSURE 80-89 MM HG: ICD-10-PCS | Mod: CPTII,S$GLB,, | Performed by: PSYCHIATRY & NEUROLOGY

## 2021-08-26 PROCEDURE — 3008F BODY MASS INDEX DOCD: CPT | Mod: CPTII,S$GLB,, | Performed by: PSYCHIATRY & NEUROLOGY

## 2021-08-26 RX ORDER — SERTRALINE HYDROCHLORIDE 100 MG/1
100 TABLET, FILM COATED ORAL DAILY
Qty: 90 TABLET | Refills: 1 | Status: SHIPPED | OUTPATIENT
Start: 2021-08-26 | End: 2021-12-10 | Stop reason: SDUPTHER

## 2021-08-26 RX ORDER — LAMOTRIGINE 200 MG/1
TABLET ORAL
Qty: 90 TABLET | Refills: 1 | Status: SHIPPED | OUTPATIENT
Start: 2021-08-26 | End: 2021-12-10 | Stop reason: SDUPTHER

## 2021-09-15 ENCOUNTER — TELEPHONE (OUTPATIENT)
Dept: REHABILITATION | Facility: OTHER | Age: 61
End: 2021-09-15

## 2021-10-11 ENCOUNTER — CLINICAL SUPPORT (OUTPATIENT)
Dept: REHABILITATION | Facility: OTHER | Age: 61
End: 2021-10-11
Payer: MEDICARE

## 2021-10-11 DIAGNOSIS — M53.86 DECREASED RANGE OF MOTION OF LUMBAR SPINE: ICD-10-CM

## 2021-10-11 DIAGNOSIS — M54.50 CHRONIC LEFT-SIDED LOW BACK PAIN WITHOUT SCIATICA: Primary | ICD-10-CM

## 2021-10-11 DIAGNOSIS — G89.29 CHRONIC LEFT-SIDED LOW BACK PAIN WITHOUT SCIATICA: Primary | ICD-10-CM

## 2021-10-11 PROCEDURE — 97110 THERAPEUTIC EXERCISES: CPT | Mod: PN

## 2021-10-11 PROCEDURE — 97530 THERAPEUTIC ACTIVITIES: CPT | Mod: PN

## 2021-10-11 PROCEDURE — 97112 NEUROMUSCULAR REEDUCATION: CPT | Mod: PN

## 2021-11-02 ENCOUNTER — IMMUNIZATION (OUTPATIENT)
Dept: PHARMACY | Facility: CLINIC | Age: 61
End: 2021-11-02
Payer: MEDICARE

## 2021-11-02 DIAGNOSIS — Z23 NEED FOR VACCINATION: Primary | ICD-10-CM

## 2021-12-10 ENCOUNTER — OFFICE VISIT (OUTPATIENT)
Dept: PSYCHIATRY | Facility: CLINIC | Age: 61
End: 2021-12-10
Payer: MEDICARE

## 2021-12-10 VITALS
HEART RATE: 86 BPM | DIASTOLIC BLOOD PRESSURE: 94 MMHG | SYSTOLIC BLOOD PRESSURE: 142 MMHG | WEIGHT: 210.31 LBS | BODY MASS INDEX: 31.06 KG/M2

## 2021-12-10 DIAGNOSIS — F39 MOOD DISORDER: ICD-10-CM

## 2021-12-10 PROCEDURE — 99214 OFFICE O/P EST MOD 30 MIN: CPT | Mod: HCNC,S$GLB,, | Performed by: PSYCHIATRY & NEUROLOGY

## 2021-12-10 PROCEDURE — 99499 RISK ADDL DX/OHS AUDIT: ICD-10-PCS | Mod: S$GLB,,, | Performed by: PSYCHIATRY & NEUROLOGY

## 2021-12-10 PROCEDURE — 99214 PR OFFICE/OUTPT VISIT, EST, LEVL IV, 30-39 MIN: ICD-10-PCS | Mod: HCNC,S$GLB,, | Performed by: PSYCHIATRY & NEUROLOGY

## 2021-12-10 PROCEDURE — 99499 UNLISTED E&M SERVICE: CPT | Mod: S$GLB,,, | Performed by: PSYCHIATRY & NEUROLOGY

## 2021-12-10 RX ORDER — SERTRALINE HYDROCHLORIDE 100 MG/1
100 TABLET, FILM COATED ORAL DAILY
Qty: 90 TABLET | Refills: 1 | Status: SHIPPED | OUTPATIENT
Start: 2021-12-10 | End: 2022-03-22 | Stop reason: SDUPTHER

## 2021-12-10 RX ORDER — LAMOTRIGINE 200 MG/1
TABLET ORAL
Qty: 90 TABLET | Refills: 1 | Status: SHIPPED | OUTPATIENT
Start: 2021-12-10 | End: 2022-03-22 | Stop reason: SDUPTHER

## 2021-12-10 RX ORDER — MELOXICAM 15 MG/1
15 TABLET ORAL DAILY
Qty: 30 TABLET | Refills: 0 | Status: SHIPPED | OUTPATIENT
Start: 2021-12-10 | End: 2021-12-10

## 2022-01-05 ENCOUNTER — PATIENT MESSAGE (OUTPATIENT)
Dept: PSYCHIATRY | Facility: CLINIC | Age: 62
End: 2022-01-05
Payer: MEDICARE

## 2022-03-22 ENCOUNTER — OFFICE VISIT (OUTPATIENT)
Dept: PSYCHIATRY | Facility: CLINIC | Age: 62
End: 2022-03-22
Payer: MEDICARE

## 2022-03-22 ENCOUNTER — PATIENT MESSAGE (OUTPATIENT)
Dept: PSYCHIATRY | Facility: CLINIC | Age: 62
End: 2022-03-22
Payer: MEDICARE

## 2022-03-22 DIAGNOSIS — F39 MOOD DISORDER: ICD-10-CM

## 2022-03-22 PROCEDURE — 99214 OFFICE O/P EST MOD 30 MIN: CPT | Mod: 95,,, | Performed by: PSYCHIATRY & NEUROLOGY

## 2022-03-22 PROCEDURE — 99214 PR OFFICE/OUTPT VISIT, EST, LEVL IV, 30-39 MIN: ICD-10-PCS | Mod: 95,,, | Performed by: PSYCHIATRY & NEUROLOGY

## 2022-03-22 RX ORDER — HYDROXYZINE PAMOATE 25 MG/1
CAPSULE ORAL
Qty: 180 CAPSULE | Refills: 0 | Status: SHIPPED | OUTPATIENT
Start: 2022-03-22 | End: 2022-08-09

## 2022-03-22 RX ORDER — RISPERIDONE 1 MG/1
1 TABLET ORAL NIGHTLY
Qty: 90 TABLET | Refills: 1 | Status: SHIPPED | OUTPATIENT
Start: 2022-03-22 | End: 2022-07-26

## 2022-03-22 RX ORDER — FOLIC ACID-PYRIDOXINE-CYANOCOBALAMIN TAB 2.5-25-2 MG 2.5-25-2 MG
1 TAB ORAL DAILY
Qty: 90 TABLET | Refills: 1 | Status: SHIPPED | OUTPATIENT
Start: 2022-03-22 | End: 2022-08-10 | Stop reason: SDUPTHER

## 2022-03-22 RX ORDER — LAMOTRIGINE 200 MG/1
TABLET ORAL
Qty: 90 TABLET | Refills: 1 | Status: SHIPPED | OUTPATIENT
Start: 2022-03-22 | End: 2022-08-10 | Stop reason: SDUPTHER

## 2022-03-22 RX ORDER — SERTRALINE HYDROCHLORIDE 100 MG/1
100 TABLET, FILM COATED ORAL DAILY
Qty: 90 TABLET | Refills: 1 | Status: SHIPPED | OUTPATIENT
Start: 2022-03-22 | End: 2022-08-10 | Stop reason: SDUPTHER

## 2022-03-22 NOTE — PROGRESS NOTES
The patient location is: [place], [place]  The chief complaint leading to consultation is: depression, psychosis    Visit type: audiovisual    Face to Face time with patient: 15 min  15 minutes of total time spent on the encounter, which includes face to face time and non-face to face time preparing to see the patient (eg, review of tests), Obtaining and/or reviewing separately obtained history, Documenting clinical information in the electronic or other health record, Independently interpreting results (not separately reported) and communicating results to the patient/family/caregiver, or Care coordination (not separately reported).         Each patient to whom he or she provides medical services by telemedicine is:  (1) informed of the relationship between the physician and patient and the respective role of any other health care provider with respect to management of the patient; and (2) notified that he or she may decline to receive medical services by telemedicine and may withdraw from such care at any time.    Notes:     ESTABLISHED OUTPATIENT VISIT   E/M LEVEL 4: [id number]    ENCOUNTER DATE: 3/22/2022  SITE: [facility] [address]    HISTORY    CHIEF COMPLAINT   Roni Espinoza is a 61 y.o. male who presents for follow up of Cognitive d/o N.O.S., Mood d/o N.O.S., Psychosis N.O.S.  .    HPI     No recent definite seizure.    Hydroxyzine remains helpful for sleep.    Pt. Reports feeling paranoid at times.    He appears psychiatrically stable during today's visit. Shows a sense of humor.    Wife present during part of today's visit.    Psychiatric Review Of Systems - Is patient experiencing or having changes in:  sleep: good, takes Hydroxyzine prn  appetite: no  Weight: no  energy/anergy: no  interest/pleasure/anhedonia: no  somatic symptoms: no  libido: no  anxiety/panic: no  guilty/hopelessness: no  concentration: no  S.I.B.s/risky behavior: no  Irritability: no  Racing thoughts: no  Impulsive  behaviors: no  Paranoia:no  AVH:no    Recent alcohol: rare small amount  Recent drug: no    Medical ROS   knee pain     PAST MEDICAL, FAMILY AND SOCIAL HISTORY: The patient's past medical, family and social history have been reviewed and updated as appropriate within the electronic medical record - see encounter notes.    PSYCHOTROPIC MEDICATIONS   Lamictal 200 mg qam, Zoloft 100 mg qam, Risperdal 1 mg at bedtime, Hydroxyzine 50 mg at bedtime prn[takes occasionally]    EXAMINATION    There were no vitals filed for this visit.  There were no vitals taken for this visit.    CONSTITUTIONAL  General Appearance: well nourished    MUSCULOSKELETAL  Muscle Strength and Tone: normal strength and tone  Abnormal Involuntary Movements: no abnormal movement noted  Gait and Station: normal gait    PSYCHIATRIC   Level of Consciousness: alert  Orientation: knows correct day of the week, knows the month, does not know the day of the month  Grooming: well groomed  Psychomotor Behavior: no restlessness/agitation  Speech: normal in rate, rhythm and volume  Language: normal vocabulary  Mood: steady  Affect: appropriate  Thought Process: some disorganization  Associations: intact associations  Thought Content: no SI/HI  Memory: grossly intact  Attention: intact to content of interview  Fund of Knowledge: appears adequate  Insight: good  Judgement: good    MEDICAL DECISION MAKING    DIAGNOSES  Cognitive d/o N.O.S., Depressive d/o unspecified, Psychosis N.O.S.    PROBLEM LIST AND MANAGEMENT PLANS    - depressive d/o, psychosis: continue above meds  - rtc 3 months     Time with patient: 15 min    LABORATORY DATA  No visits with results within 3 Month(s) from this visit.   Latest known visit with results is:   Clinical Support on 06/14/2021   Component Date Value Ref Range Status    Holter length hours 06/14/2021 48   Final    holter length minutes 06/14/2021 0   Final    holter length dec hours 06/14/2021 48.00   Final    Event Monitor  Day 06/14/2021 0   Final           Vinnie Cotter

## 2022-04-21 ENCOUNTER — IMMUNIZATION (OUTPATIENT)
Dept: PHARMACY | Facility: CLINIC | Age: 62
End: 2022-04-21
Payer: MEDICARE

## 2022-04-21 DIAGNOSIS — Z23 NEED FOR VACCINATION: Primary | ICD-10-CM

## 2022-08-10 ENCOUNTER — OFFICE VISIT (OUTPATIENT)
Dept: PSYCHIATRY | Facility: CLINIC | Age: 62
End: 2022-08-10
Payer: MEDICARE

## 2022-08-10 VITALS
HEART RATE: 71 BPM | DIASTOLIC BLOOD PRESSURE: 83 MMHG | WEIGHT: 201.81 LBS | BODY MASS INDEX: 29.81 KG/M2 | SYSTOLIC BLOOD PRESSURE: 130 MMHG

## 2022-08-10 DIAGNOSIS — F39 MOOD DISORDER: ICD-10-CM

## 2022-08-10 PROCEDURE — 99999 PR PBB SHADOW E&M-EST. PATIENT-LVL I: ICD-10-PCS | Mod: PBBFAC,,, | Performed by: PSYCHIATRY & NEUROLOGY

## 2022-08-10 PROCEDURE — 3075F PR MOST RECENT SYSTOLIC BLOOD PRESS GE 130-139MM HG: ICD-10-PCS | Mod: CPTII,S$GLB,, | Performed by: PSYCHIATRY & NEUROLOGY

## 2022-08-10 PROCEDURE — 99214 PR OFFICE/OUTPT VISIT, EST, LEVL IV, 30-39 MIN: ICD-10-PCS | Mod: S$GLB,,, | Performed by: PSYCHIATRY & NEUROLOGY

## 2022-08-10 PROCEDURE — 99214 OFFICE O/P EST MOD 30 MIN: CPT | Mod: S$GLB,,, | Performed by: PSYCHIATRY & NEUROLOGY

## 2022-08-10 PROCEDURE — 3075F SYST BP GE 130 - 139MM HG: CPT | Mod: CPTII,S$GLB,, | Performed by: PSYCHIATRY & NEUROLOGY

## 2022-08-10 PROCEDURE — 3079F DIAST BP 80-89 MM HG: CPT | Mod: CPTII,S$GLB,, | Performed by: PSYCHIATRY & NEUROLOGY

## 2022-08-10 PROCEDURE — 3079F PR MOST RECENT DIASTOLIC BLOOD PRESSURE 80-89 MM HG: ICD-10-PCS | Mod: CPTII,S$GLB,, | Performed by: PSYCHIATRY & NEUROLOGY

## 2022-08-10 PROCEDURE — 99499 RISK ADDL DX/OHS AUDIT: ICD-10-PCS | Mod: S$GLB,,, | Performed by: PSYCHIATRY & NEUROLOGY

## 2022-08-10 PROCEDURE — 3008F PR BODY MASS INDEX (BMI) DOCUMENTED: ICD-10-PCS | Mod: CPTII,S$GLB,, | Performed by: PSYCHIATRY & NEUROLOGY

## 2022-08-10 PROCEDURE — 3008F BODY MASS INDEX DOCD: CPT | Mod: CPTII,S$GLB,, | Performed by: PSYCHIATRY & NEUROLOGY

## 2022-08-10 PROCEDURE — 99999 PR PBB SHADOW E&M-EST. PATIENT-LVL I: CPT | Mod: PBBFAC,,, | Performed by: PSYCHIATRY & NEUROLOGY

## 2022-08-10 PROCEDURE — 99499 UNLISTED E&M SERVICE: CPT | Mod: S$GLB,,, | Performed by: PSYCHIATRY & NEUROLOGY

## 2022-08-10 RX ORDER — SERTRALINE HYDROCHLORIDE 100 MG/1
100 TABLET, FILM COATED ORAL DAILY
Qty: 90 TABLET | Refills: 1 | Status: SHIPPED | OUTPATIENT
Start: 2022-08-10 | End: 2022-11-15 | Stop reason: SDUPTHER

## 2022-08-10 RX ORDER — FOLIC ACID-PYRIDOXINE-CYANOCOBALAMIN TAB 2.5-25-2 MG 2.5-25-2 MG
1 TAB ORAL DAILY
Qty: 90 TABLET | Refills: 1 | Status: SHIPPED | OUTPATIENT
Start: 2022-08-10 | End: 2022-11-15 | Stop reason: SDUPTHER

## 2022-08-10 RX ORDER — LAMOTRIGINE 200 MG/1
TABLET ORAL
Qty: 90 TABLET | Refills: 1 | Status: SHIPPED | OUTPATIENT
Start: 2022-08-10 | End: 2022-11-15 | Stop reason: SDUPTHER

## 2022-08-10 NOTE — PROGRESS NOTES
ESTABLISHED OUTPATIENT VISIT   E/M LEVEL 4: [id number]    ENCOUNTER DATE: 8/10/2022  SITE: [facility] [address]    HISTORY    CHIEF COMPLAINT   Roni Espinoza is a 61 y.o. male who presents for follow up of Cognitive d/o N.O.S., Mood d/o N.O.S., Psychosis N.O.S.  .    HPI     No recent definite seizure.    Hydroxyzine remains helpful for sleep.    Pt. Reports feeling paranoid at times.    He appears psychiatrically stable during today's visit. Shows a sense of humor.    Psychiatric Review Of Systems - Is patient experiencing or having changes in:  sleep: good, takes Hydroxyzine prn  appetite: no  Weight: no  energy/anergy: no  interest/pleasure/anhedonia: no  somatic symptoms: no  libido: no  anxiety/panic: no  guilty/hopelessness: no  concentration: no  S.I.B.s/risky behavior: no  Irritability: no  Racing thoughts: no  Impulsive behaviors: no  Paranoia:no  AVH:no    Recent alcohol: rare small amount  Recent drug: no    Medical ROS   knee pain improved     PAST MEDICAL, FAMILY AND SOCIAL HISTORY: The patient's past medical, family and social history have been reviewed and updated as appropriate within the electronic medical record - see encounter notes.    PSYCHOTROPIC MEDICATIONS   Lamictal 200 mg qam, Zoloft 100 mg qam, Risperdal 1 mg at bedtime, Hydroxyzine 50 mg at bedtime prn[takes almost every night], Folbic    EXAMINATION    Vitals:    08/10/22 0925   BP: 130/83   Pulse: 71     /83   Pulse 71   Wt 91.6 kg (201 lb 13.3 oz)   BMI 29.81 kg/m²     CONSTITUTIONAL  General Appearance: well nourished    MUSCULOSKELETAL  Muscle Strength and Tone: normal strength and tone  Abnormal Involuntary Movements: no abnormal movement noted  Gait and Station: normal gait    PSYCHIATRIC   Level of Consciousness: alert  Orientation: knows correct day of the week, knows the month, does not know the day of the month  Grooming: well groomed  Psychomotor Behavior: no restlessness/agitation  Speech: normal in  rate, rhythm and volume  Language: normal vocabulary  Mood: steady  Affect: appropriate  Thought Process: some disorganization  Associations: intact associations  Thought Content: no SI/HI  Memory: grossly intact  Attention: intact to content of interview  Fund of Knowledge: appears adequate  Insight: good  Judgement: good    MEDICAL DECISION MAKING    DIAGNOSES  Cognitive d/o N.O.S., Depressive d/o unspecified, Psychosis N.O.S.    PROBLEM LIST AND MANAGEMENT PLANS    - depressive d/o, psychosis: continue above meds  - rtc 3 months     Time with patient: 20 min    LABORATORY DATA  No visits with results within 3 Month(s) from this visit.   Latest known visit with results is:   Clinical Support on 06/14/2021   Component Date Value Ref Range Status    Holter length hours 06/14/2021 48   Final    holter length minutes 06/14/2021 0   Final    holter length dec hours 06/14/2021 48.00   Final    Event Monitor Day 06/14/2021 0   Final           Vinnie Cotter

## 2022-08-31 ENCOUNTER — HOSPITAL ENCOUNTER (OUTPATIENT)
Dept: RADIOLOGY | Facility: HOSPITAL | Age: 62
Discharge: HOME | End: 2022-08-31
Attending: STUDENT IN AN ORGANIZED HEALTH CARE EDUCATION/TRAINING PROGRAM
Payer: MEDICARE

## 2022-08-31 ENCOUNTER — PATIENT MESSAGE (OUTPATIENT)
Dept: PHARMACY | Facility: CLINIC | Age: 62
End: 2022-08-31
Payer: MEDICARE

## 2022-08-31 ENCOUNTER — OFFICE VISIT (OUTPATIENT)
Dept: INTERNAL MEDICINE | Facility: CLINIC | Age: 62
End: 2022-08-31
Payer: MEDICARE

## 2022-08-31 VITALS
HEART RATE: 86 BPM | OXYGEN SATURATION: 96 % | SYSTOLIC BLOOD PRESSURE: 122 MMHG | WEIGHT: 199.5 LBS | BODY MASS INDEX: 29.55 KG/M2 | DIASTOLIC BLOOD PRESSURE: 80 MMHG | HEIGHT: 69 IN

## 2022-08-31 DIAGNOSIS — E78.5 HYPERLIPIDEMIA, UNSPECIFIED HYPERLIPIDEMIA TYPE: ICD-10-CM

## 2022-08-31 DIAGNOSIS — I10 ESSENTIAL HYPERTENSION: Primary | ICD-10-CM

## 2022-08-31 DIAGNOSIS — M54.2 NECK PAIN: ICD-10-CM

## 2022-08-31 DIAGNOSIS — K57.30 DIVERTICULOSIS OF LARGE INTESTINE WITHOUT PERFORATION OR ABSCESS WITHOUT BLEEDING: ICD-10-CM

## 2022-08-31 DIAGNOSIS — Z12.11 SCREEN FOR COLON CANCER: ICD-10-CM

## 2022-08-31 DIAGNOSIS — G89.29 CHRONIC PAIN OF LEFT KNEE: ICD-10-CM

## 2022-08-31 DIAGNOSIS — M25.562 CHRONIC PAIN OF LEFT KNEE: ICD-10-CM

## 2022-08-31 PROCEDURE — 72050 X-RAY EXAM NECK SPINE 4/5VWS: CPT | Mod: 26,,, | Performed by: RADIOLOGY

## 2022-08-31 PROCEDURE — 99999 PR PBB SHADOW E&M-EST. PATIENT-LVL IV: ICD-10-PCS | Mod: PBBFAC,GC,, | Performed by: STUDENT IN AN ORGANIZED HEALTH CARE EDUCATION/TRAINING PROGRAM

## 2022-08-31 PROCEDURE — 3079F DIAST BP 80-89 MM HG: CPT | Mod: CPTII,GC,S$GLB, | Performed by: STUDENT IN AN ORGANIZED HEALTH CARE EDUCATION/TRAINING PROGRAM

## 2022-08-31 PROCEDURE — 99499 UNLISTED E&M SERVICE: CPT | Mod: HCNC,S$GLB,, | Performed by: STUDENT IN AN ORGANIZED HEALTH CARE EDUCATION/TRAINING PROGRAM

## 2022-08-31 PROCEDURE — 3074F SYST BP LT 130 MM HG: CPT | Mod: CPTII,GC,S$GLB, | Performed by: STUDENT IN AN ORGANIZED HEALTH CARE EDUCATION/TRAINING PROGRAM

## 2022-08-31 PROCEDURE — 3008F PR BODY MASS INDEX (BMI) DOCUMENTED: ICD-10-PCS | Mod: CPTII,GC,S$GLB, | Performed by: STUDENT IN AN ORGANIZED HEALTH CARE EDUCATION/TRAINING PROGRAM

## 2022-08-31 PROCEDURE — 72050 X-RAY EXAM NECK SPINE 4/5VWS: CPT | Mod: TC

## 2022-08-31 PROCEDURE — 3074F PR MOST RECENT SYSTOLIC BLOOD PRESSURE < 130 MM HG: ICD-10-PCS | Mod: CPTII,GC,S$GLB, | Performed by: STUDENT IN AN ORGANIZED HEALTH CARE EDUCATION/TRAINING PROGRAM

## 2022-08-31 PROCEDURE — 99213 PR OFFICE/OUTPT VISIT, EST, LEVL III, 20-29 MIN: ICD-10-PCS | Mod: GC,S$GLB,, | Performed by: STUDENT IN AN ORGANIZED HEALTH CARE EDUCATION/TRAINING PROGRAM

## 2022-08-31 PROCEDURE — 3008F BODY MASS INDEX DOCD: CPT | Mod: CPTII,GC,S$GLB, | Performed by: STUDENT IN AN ORGANIZED HEALTH CARE EDUCATION/TRAINING PROGRAM

## 2022-08-31 PROCEDURE — 3079F PR MOST RECENT DIASTOLIC BLOOD PRESSURE 80-89 MM HG: ICD-10-PCS | Mod: CPTII,GC,S$GLB, | Performed by: STUDENT IN AN ORGANIZED HEALTH CARE EDUCATION/TRAINING PROGRAM

## 2022-08-31 PROCEDURE — 99213 OFFICE O/P EST LOW 20 MIN: CPT | Mod: GC,S$GLB,, | Performed by: STUDENT IN AN ORGANIZED HEALTH CARE EDUCATION/TRAINING PROGRAM

## 2022-08-31 PROCEDURE — 99999 PR PBB SHADOW E&M-EST. PATIENT-LVL IV: CPT | Mod: PBBFAC,GC,, | Performed by: STUDENT IN AN ORGANIZED HEALTH CARE EDUCATION/TRAINING PROGRAM

## 2022-08-31 PROCEDURE — 72050 XR CERVICAL SPINE COMPLETE 5 VIEW: ICD-10-PCS | Mod: 26,,, | Performed by: RADIOLOGY

## 2022-08-31 PROCEDURE — 99499 RISK ADDL DX/OHS AUDIT: ICD-10-PCS | Mod: HCNC,S$GLB,, | Performed by: STUDENT IN AN ORGANIZED HEALTH CARE EDUCATION/TRAINING PROGRAM

## 2022-08-31 RX ORDER — AMLODIPINE BESYLATE 5 MG/1
5 TABLET ORAL DAILY
Qty: 90 TABLET | Refills: 3 | Status: SHIPPED | OUTPATIENT
Start: 2022-08-31 | End: 2023-10-18 | Stop reason: SDUPTHER

## 2022-08-31 RX ORDER — ROSUVASTATIN CALCIUM 20 MG/1
20 TABLET, COATED ORAL DAILY
Qty: 90 TABLET | Refills: 3 | Status: SHIPPED | OUTPATIENT
Start: 2022-08-31 | End: 2023-08-31

## 2022-08-31 RX ORDER — MELOXICAM 15 MG/1
15 TABLET ORAL DAILY
Qty: 30 TABLET | Refills: 0 | Status: SHIPPED | OUTPATIENT
Start: 2022-08-31

## 2022-09-01 NOTE — PROGRESS NOTES
Subjective     Chief Complaint: Annual exam    History of Present Illness:  Mr. Roni Espinoza is a 61 y.o. male with history of hypertension, hyperlipidemia and seizures presenting for his annual exam. He reports chronic back and knee pain. He was seen by orthopedics last year who prescribed mobic and physical therapy. He is interested in resuming therapy. Notes his pain improves when lying flat on the floor and worsens with movement. Has tried heat, ice and anti-inflammatories with minimal relief. He is able to ambulate on his own and denies any falls.   HTN- Readings at home are well controlled, systolics less than 130.  Denies any alcohol, tobacco or recreational drug use.     Review of Systems   Constitutional:  Negative for chills, fever and weight loss.   HENT:  Negative for congestion and sore throat.    Eyes:  Negative for blurred vision and double vision.   Respiratory:  Negative for cough and shortness of breath.    Cardiovascular:  Negative for chest pain, palpitations and leg swelling.   Gastrointestinal:  Negative for abdominal pain and nausea.   Genitourinary:  Negative for dysuria and hematuria.   Musculoskeletal:  Positive for back pain and joint pain. Negative for myalgias.   Skin:  Negative for rash.   Neurological:  Negative for dizziness and weakness.   Psychiatric/Behavioral:  Negative for depression. The patient is not nervous/anxious.      PAST HISTORY:     Past Medical History:   Diagnosis Date    Allergy     Anxiety     Depression     Headache(784.0)     History of psychiatric hospitalization     Hx of psychiatric care     Hypertension     Psychiatric problem     Seizures     last seizure 2006    Therapy        Past Surgical History:   Procedure Laterality Date    BRAIN SURGERY  1990    AV malformation repair    COLONOSCOPY N/A 10/3/2016    Procedure: COLONOSCOPY;  Surgeon: Ehsan Odom MD;  Location: [facility] ([address] [place] [address]);  Service: Endoscopy;  Laterality: N/A;  ok for me or wes to  "do colonoscopy per patient would like asap       Family History   Problem Relation Age of Onset    Heart attack Brother 62    Cancer Brother     Cancer Mother     Thyroid disease Daughter     Heart disease Daughter     No Known Problems Son     Cancer Brother     Alcohol abuse Maternal Uncle     Depression Maternal Uncle     Celiac disease Neg Hx     Colon cancer Neg Hx     Esophageal cancer Neg Hx        Social History     Socioeconomic History    Marital status:    Tobacco Use    Smoking status: Never    Smokeless tobacco: Never   Substance and Sexual Activity    Alcohol use: No     Alcohol/week: 0.0 standard drinks    Drug use: No    Sexual activity: Yes     Partners: Female     Birth control/protection: Post-menopausal       MEDICATIONS & ALLERGIES:     Current Outpatient Medications on File Prior to Visit   Medication Sig    folic acid-vit B6-vit B12 2.5-25-2 mg (FOLBIC) 2.5-25-2 mg Tab Take 1 tablet by mouth once daily.    hydrOXYzine pamoate (VISTARIL) 25 MG Cap TAKE 1 OR 2 CAPSULES AT BEDTIME AS NEEDED FOR SLEEP    lamoTRIgine (LAMICTAL) 200 MG tablet TAKE 1 TABLET ONE TIME DAILY    pantoprazole (PROTONIX) 40 MG tablet take 1 tablet by mouth every morning BEFORE BREAKFAST    risperiDONE (RISPERDAL) 1 MG tablet TAKE 1 TABLET EVERY EVENING    sars-cov-2, covid-19, (MODERNA COVID-19) 50 mcg/0.25 ml injection (BOOSTER) Inject into the muscle.    sars-cov-2, covid-19, (MODERNA COVID-19) 50 mcg/0.25 ml injection (BOOSTER) Inject into the muscle.    sertraline (ZOLOFT) 100 MG tablet Take 1 tablet (100 mg total) by mouth once daily.     No current facility-administered medications on file prior to visit.       Review of patient's allergies indicates:  No Known Allergies    OBJECTIVE:     Vital Signs:  Vitals:    08/31/22 1541   BP: 122/80   BP Location: Right arm   Patient Position: Sitting   BP Method: Large (Manual)   Pulse: 86   SpO2: 96%   Weight: 90.5 kg (199 lb 8.3 oz)   Height: 5' 9" (1.753 m) "       Body mass index is 29.46 kg/m².     Physical Exam  HENT:      Head: Normocephalic and atraumatic.   Eyes:      Extraocular Movements: Extraocular movements intact.   Cardiovascular:      Rate and Rhythm: Normal rate and regular rhythm.      Pulses: Normal pulses.      Heart sounds: Normal heart sounds.   Pulmonary:      Effort: Pulmonary effort is normal.      Breath sounds: Normal breath sounds. No wheezing or rales.   Abdominal:      General: Bowel sounds are normal.      Palpations: Abdomen is soft.      Tenderness: There is no abdominal tenderness.   Musculoskeletal:         General: Swelling (Left knee, boggy) and tenderness present. Normal range of motion.      Cervical back: Normal range of motion.      Right lower leg: No edema.      Left lower leg: No edema.   Skin:     General: Skin is warm and dry.   Neurological:      Mental Status: He is alert and oriented to person, place, and time.      Comments: 5/5 strength in all extremities, except left knee flexion/extension 4/5   Psychiatric:         Mood and Affect: Mood normal.         ASSESSMENT & PLAN:     Essential hypertension  Well controlled, continue current regimen.  -     amLODIPine (NORVASC) 5 MG tablet; Take 1 tablet (5 mg total) by mouth once daily.  Dispense: 90 tablet; Refill: 3  -     CBC W/ AUTO DIFFERENTIAL; Future; Expected date: 08/31/2022  -     COMPREHENSIVE METABOLIC PANEL; Future; Expected date: 08/31/2022  -     LIPID PANEL; Future; Expected date: 08/31/2022    Hyperlipidemia, unspecified hyperlipidemia type  -     rosuvastatin (CRESTOR) 20 MG tablet; Take 1 tablet (20 mg total) by mouth once daily.  Dispense: 90 tablet; Refill: 3  -     LIPID PANEL; Future; Expected date: 08/31/2022    Screen for colon cancer  Diverticulosis of large intestine without perforation or abscess without bleeding  -     Case Request Endoscopy: COLONOSCOPY    Neck pain  Chronic pain of left knee  -     meloxicam (MOBIC) 15 MG tablet; Take 1 tablet (15  mg total) by mouth once daily.  Dispense: 30 tablet; Refill: 0  -     Ambulatory referral/consult to Orthopedics; Future; Expected date: 09/07/2022  -     X-Ray Cervical Spine Complete 5 view; Future; Expected date: 08/31/2022  -     Ambulatory referral/consult to Physical/Occupational Therapy; Future; Expected date: 09/07/2022      Discussed with Dr. Thompson  - staff attestation to follow      Мария España DO  Internal Medicine PGY3  [facility]  [address] [place] [address]

## 2022-09-12 ENCOUNTER — LAB VISIT (OUTPATIENT)
Dept: LAB | Facility: OTHER | Age: 62
End: 2022-09-12
Attending: STUDENT IN AN ORGANIZED HEALTH CARE EDUCATION/TRAINING PROGRAM
Payer: MEDICARE

## 2022-09-12 DIAGNOSIS — E78.5 HYPERLIPIDEMIA, UNSPECIFIED HYPERLIPIDEMIA TYPE: ICD-10-CM

## 2022-09-12 DIAGNOSIS — I10 ESSENTIAL HYPERTENSION: ICD-10-CM

## 2022-09-12 LAB
ALBUMIN SERPL BCP-MCNC: 4.1 G/DL (ref 3.5–5.2)
ALP SERPL-CCNC: 107 U/L (ref 55–135)
ALT SERPL W/O P-5'-P-CCNC: 20 U/L (ref 10–44)
ANION GAP SERPL CALC-SCNC: 8 MMOL/L (ref 8–16)
AST SERPL-CCNC: 26 U/L (ref 10–40)
BASOPHILS # BLD AUTO: 0.02 K/UL (ref 0–0.2)
BASOPHILS NFR BLD: 0.3 % (ref 0–1.9)
BILIRUB SERPL-MCNC: 0.6 MG/DL (ref 0.1–1)
BUN SERPL-MCNC: 9 MG/DL (ref 8–23)
CALCIUM SERPL-MCNC: 9.2 MG/DL (ref 8.7–10.5)
CHLORIDE SERPL-SCNC: 109 MMOL/L (ref 95–110)
CHOLEST SERPL-MCNC: 193 MG/DL (ref 120–199)
CHOLEST/HDLC SERPL: 2.7 {RATIO} (ref 2–5)
CO2 SERPL-SCNC: 25 MMOL/L (ref 23–29)
CREAT SERPL-MCNC: 1 MG/DL (ref 0.5–1.4)
DIFFERENTIAL METHOD: ABNORMAL
EOSINOPHIL # BLD AUTO: 0.1 K/UL (ref 0–0.5)
EOSINOPHIL NFR BLD: 1.6 % (ref 0–8)
ERYTHROCYTE [DISTWIDTH] IN BLOOD BY AUTOMATED COUNT: 15.7 % (ref 11.5–14.5)
EST. GFR  (NO RACE VARIABLE): >60 ML/MIN/1.73 M^2
GLUCOSE SERPL-MCNC: 90 MG/DL (ref 70–110)
HCT VFR BLD AUTO: 43.5 % (ref 40–54)
HDLC SERPL-MCNC: 71 MG/DL (ref 40–75)
HDLC SERPL: 36.8 % (ref 20–50)
HGB BLD-MCNC: 14.5 G/DL (ref 14–18)
IMM GRANULOCYTES # BLD AUTO: 0.01 K/UL (ref 0–0.04)
IMM GRANULOCYTES NFR BLD AUTO: 0.1 % (ref 0–0.5)
LDLC SERPL CALC-MCNC: 103.2 MG/DL (ref 63–159)
LYMPHOCYTES # BLD AUTO: 1.5 K/UL (ref 1–4.8)
LYMPHOCYTES NFR BLD: 19.1 % (ref 18–48)
MCH RBC QN AUTO: 30.9 PG (ref 27–31)
MCHC RBC AUTO-ENTMCNC: 33.3 G/DL (ref 32–36)
MCV RBC AUTO: 93 FL (ref 82–98)
MONOCYTES # BLD AUTO: 0.7 K/UL (ref 0.3–1)
MONOCYTES NFR BLD: 9.3 % (ref 4–15)
NEUTROPHILS # BLD AUTO: 5.3 K/UL (ref 1.8–7.7)
NEUTROPHILS NFR BLD: 69.6 % (ref 38–73)
NONHDLC SERPL-MCNC: 122 MG/DL
NRBC BLD-RTO: 0 /100 WBC
PLATELET # BLD AUTO: 205 K/UL (ref 150–450)
PMV BLD AUTO: 10.2 FL (ref 9.2–12.9)
POTASSIUM SERPL-SCNC: 3.8 MMOL/L (ref 3.5–5.1)
PROT SERPL-MCNC: 7.5 G/DL (ref 6–8.4)
RBC # BLD AUTO: 4.69 M/UL (ref 4.6–6.2)
SODIUM SERPL-SCNC: 142 MMOL/L (ref 136–145)
TRIGL SERPL-MCNC: 94 MG/DL (ref 30–150)
WBC # BLD AUTO: 7.64 K/UL (ref 3.9–12.7)

## 2022-09-12 PROCEDURE — 36415 COLL VENOUS BLD VENIPUNCTURE: CPT | Performed by: STUDENT IN AN ORGANIZED HEALTH CARE EDUCATION/TRAINING PROGRAM

## 2022-09-12 PROCEDURE — 80061 LIPID PANEL: CPT | Performed by: STUDENT IN AN ORGANIZED HEALTH CARE EDUCATION/TRAINING PROGRAM

## 2022-09-12 PROCEDURE — 80053 COMPREHEN METABOLIC PANEL: CPT | Performed by: STUDENT IN AN ORGANIZED HEALTH CARE EDUCATION/TRAINING PROGRAM

## 2022-09-12 PROCEDURE — 85025 COMPLETE CBC W/AUTO DIFF WBC: CPT | Performed by: STUDENT IN AN ORGANIZED HEALTH CARE EDUCATION/TRAINING PROGRAM

## 2022-09-13 ENCOUNTER — IMMUNIZATION (OUTPATIENT)
Dept: PHARMACY | Facility: CLINIC | Age: 62
End: 2022-09-13
Payer: MEDICARE

## 2022-09-22 ENCOUNTER — TELEPHONE (OUTPATIENT)
Dept: CRITICAL CARE MEDICINE | Facility: HOSPITAL | Age: 62
End: 2022-09-22
Payer: MEDICARE

## 2022-09-22 NOTE — TELEPHONE ENCOUNTER
Called patient to discuss labs and imaging findings. Recommended he made an appointment with orthopedics to further discuss his neck, back and knee pain.     Мария España, DO  Internal Medicine, PGY-3  [facility]

## 2022-10-22 DIAGNOSIS — Z12.11 COLON CANCER SCREENING: ICD-10-CM

## 2022-10-22 DIAGNOSIS — K57.30 DIVERTICULOSIS LARGE INTESTINE W/O PERFORATION OR ABSCESS W/O BLEEDING: Primary | ICD-10-CM

## 2022-11-15 ENCOUNTER — OFFICE VISIT (OUTPATIENT)
Dept: PSYCHIATRY | Facility: CLINIC | Age: 62
End: 2022-11-15
Payer: MEDICARE

## 2022-11-15 VITALS
BODY MASS INDEX: 29.72 KG/M2 | SYSTOLIC BLOOD PRESSURE: 135 MMHG | HEART RATE: 69 BPM | DIASTOLIC BLOOD PRESSURE: 96 MMHG | WEIGHT: 201.25 LBS

## 2022-11-15 DIAGNOSIS — F39 MOOD DISORDER: ICD-10-CM

## 2022-11-15 PROCEDURE — 3080F PR MOST RECENT DIASTOLIC BLOOD PRESSURE >= 90 MM HG: ICD-10-PCS | Mod: CPTII,S$GLB,, | Performed by: PSYCHIATRY & NEUROLOGY

## 2022-11-15 PROCEDURE — 99214 PR OFFICE/OUTPT VISIT, EST, LEVL IV, 30-39 MIN: ICD-10-PCS | Mod: S$GLB,,, | Performed by: PSYCHIATRY & NEUROLOGY

## 2022-11-15 PROCEDURE — 3077F SYST BP >= 140 MM HG: CPT | Mod: CPTII,S$GLB,, | Performed by: PSYCHIATRY & NEUROLOGY

## 2022-11-15 PROCEDURE — 99214 OFFICE O/P EST MOD 30 MIN: CPT | Mod: S$GLB,,, | Performed by: PSYCHIATRY & NEUROLOGY

## 2022-11-15 PROCEDURE — 3008F BODY MASS INDEX DOCD: CPT | Mod: CPTII,S$GLB,, | Performed by: PSYCHIATRY & NEUROLOGY

## 2022-11-15 PROCEDURE — 3080F DIAST BP >= 90 MM HG: CPT | Mod: CPTII,S$GLB,, | Performed by: PSYCHIATRY & NEUROLOGY

## 2022-11-15 PROCEDURE — 99999 PR PBB SHADOW E&M-EST. PATIENT-LVL I: ICD-10-PCS | Mod: PBBFAC,,, | Performed by: PSYCHIATRY & NEUROLOGY

## 2022-11-15 PROCEDURE — 3008F PR BODY MASS INDEX (BMI) DOCUMENTED: ICD-10-PCS | Mod: CPTII,S$GLB,, | Performed by: PSYCHIATRY & NEUROLOGY

## 2022-11-15 PROCEDURE — 99999 PR PBB SHADOW E&M-EST. PATIENT-LVL I: CPT | Mod: PBBFAC,,, | Performed by: PSYCHIATRY & NEUROLOGY

## 2022-11-15 PROCEDURE — 3077F PR MOST RECENT SYSTOLIC BLOOD PRESSURE >= 140 MM HG: ICD-10-PCS | Mod: CPTII,S$GLB,, | Performed by: PSYCHIATRY & NEUROLOGY

## 2022-11-15 RX ORDER — LAMOTRIGINE 200 MG/1
TABLET ORAL
Qty: 90 TABLET | Refills: 1 | Status: SHIPPED | OUTPATIENT
Start: 2022-11-15 | End: 2023-10-10 | Stop reason: SDUPTHER

## 2022-11-15 RX ORDER — RISPERIDONE 1 MG/1
1 TABLET ORAL NIGHTLY
Qty: 90 TABLET | Refills: 1 | Status: SHIPPED | OUTPATIENT
Start: 2022-11-15 | End: 2023-10-06

## 2022-11-15 RX ORDER — FOLIC ACID-PYRIDOXINE-CYANOCOBALAMIN TAB 2.5-25-2 MG 2.5-25-2 MG
1 TAB ORAL DAILY
Qty: 90 TABLET | Refills: 1 | Status: SHIPPED | OUTPATIENT
Start: 2022-11-15 | End: 2023-06-21

## 2022-11-15 RX ORDER — HYDROXYZINE PAMOATE 25 MG/1
CAPSULE ORAL
Qty: 180 CAPSULE | Refills: 0 | Status: SHIPPED | OUTPATIENT
Start: 2022-11-15 | End: 2023-03-13

## 2022-11-15 RX ORDER — SERTRALINE HYDROCHLORIDE 100 MG/1
100 TABLET, FILM COATED ORAL DAILY
Qty: 90 TABLET | Refills: 1 | Status: SHIPPED | OUTPATIENT
Start: 2022-11-15 | End: 2023-10-10 | Stop reason: SDUPTHER

## 2022-11-15 NOTE — PROGRESS NOTES
ESTABLISHED OUTPATIENT VISIT   E/M LEVEL 4: [id number]    ENCOUNTER DATE: 11/15/2022  SITE: [facility], [address]    HISTORY    CHIEF COMPLAINT   Roni Espinoza is a 61 y.o. male who presents for follow up of Cognitive d/o N.O.S., Mood d/o N.O.S., Psychosis N.O.S.  .    HPI     Denies recent seizure.    Hydroxyzine remains helpful for sleep.    Pt. Reports feeling paranoid at times.    He again appears psychiatrically stable during today's visit. Shows a sense of humor.    Psychiatric Review Of Systems - Is patient experiencing or having changes in:  sleep: good, takes Hydroxyzine prn  appetite: no  Weight: no  energy/anergy: no  interest/pleasure/anhedonia: no  somatic symptoms: no  libido: no  anxiety/panic: no  guilty/hopelessness: no  concentration: no  S.I.B.s/risky behavior: no  Irritability: no  Racing thoughts: no  Impulsive behaviors: no  Paranoia:no  AVH:no    Recent alcohol: rare small amount  Recent drug: no    Medical ROS   Left knee pain     PAST MEDICAL, FAMILY AND SOCIAL HISTORY: The patient's past medical, family and social history have been reviewed and updated as appropriate within the electronic medical record - see encounter notes.    PSYCHOTROPIC MEDICATIONS   Lamictal 200 mg qam, Zoloft 100 mg qam, Risperdal 1 mg at bedtime, Hydroxyzine 50 mg at bedtime prn[takes almost every night], Folbic    EXAMINATION    Vitals:    11/15/22 1419   BP: (!) 155/93   Pulse: 87     BP (!) 155/93   Pulse 87   Wt 91.3 kg (201 lb 4.5 oz)   BMI 29.72 kg/m²     CONSTITUTIONAL  General Appearance: well nourished    MUSCULOSKELETAL  Muscle Strength and Tone: normal strength and tone  Abnormal Involuntary Movements: no abnormal movement noted  Gait and Station: normal gait    PSYCHIATRIC   Level of Consciousness: alert  Orientation: knows correct day of the week, knows the month, does not know the day of the month  Grooming: well groomed  Psychomotor Behavior: no restlessness/agitation  Speech: normal  in rate, rhythm and volume  Language: normal vocabulary  Mood: steady  Affect: appropriate  Thought Process: some disorganization  Associations: intact associations  Thought Content: no SI/HI  Memory: grossly intact  Attention: intact to content of interview  Fund of Knowledge: appears adequate  Insight: good  Judgement: good    MEDICAL DECISION MAKING    DIAGNOSES  Cognitive d/o N.O.S., Depressive d/o unspecified, Psychosis N.O.S.    PROBLEM LIST AND MANAGEMENT PLANS    - depressive d/o, psychosis: continue above meds  - rtc 3 months     Time with patient: 20 min    LABORATORY DATA  Lab Visit on 09/12/2022   Component Date Value Ref Range Status    WBC 09/12/2022 7.64  3.90 - 12.70 K/uL Final    RBC 09/12/2022 4.69  4.60 - 6.20 M/uL Final    Hemoglobin 09/12/2022 14.5  14.0 - 18.0 g/dL Final    Hematocrit 09/12/2022 43.5  40.0 - 54.0 % Final    MCV 09/12/2022 93  82 - 98 fL Final    MCH 09/12/2022 30.9  27.0 - 31.0 pg Final    MCHC 09/12/2022 33.3  32.0 - 36.0 g/dL Final    RDW 09/12/2022 15.7 (H)  11.5 - 14.5 % Final    Platelets 09/12/2022 205  150 - 450 K/uL Final    MPV 09/12/2022 10.2  9.2 - 12.9 fL Final    Immature Granulocytes 09/12/2022 0.1  0.0 - 0.5 % Final    Gran # (ANC) 09/12/2022 5.3  1.8 - 7.7 K/uL Final    Immature Grans (Abs) 09/12/2022 0.01  0.00 - 0.04 K/uL Final    Comment: Mild elevation in immature granulocytes is non specific and   can be seen in a variety of conditions including stress response,   acute inflammation, trauma and pregnancy. Correlation with other   laboratory and clinical findings is essential.      Lymph # 09/12/2022 1.5  1.0 - 4.8 K/uL Final    Mono # 09/12/2022 0.7  0.3 - 1.0 K/uL Final    Eos # 09/12/2022 0.1  0.0 - 0.5 K/uL Final    Baso # 09/12/2022 0.02  0.00 - 0.20 K/uL Final    nRBC 09/12/2022 0  0 /100 WBC Final    Gran % 09/12/2022 69.6  38.0 - 73.0 % Final    Lymph % 09/12/2022 19.1  18.0 - 48.0 % Final    Mono % 09/12/2022 9.3  4.0 - 15.0 % Final    Eosinophil %  09/12/2022 1.6  0.0 - 8.0 % Final    Basophil % 09/12/2022 0.3  0.0 - 1.9 % Final    Differential Method 09/12/2022 Automated   Final    Sodium 09/12/2022 142  136 - 145 mmol/L Final    Potassium 09/12/2022 3.8  3.5 - 5.1 mmol/L Final    Chloride 09/12/2022 109  95 - 110 mmol/L Final    CO2 09/12/2022 25  23 - 29 mmol/L Final    Glucose 09/12/2022 90  70 - 110 mg/dL Final    BUN 09/12/2022 9  8 - 23 mg/dL Final    Creatinine 09/12/2022 1.0  0.5 - 1.4 mg/dL Final    Calcium 09/12/2022 9.2  8.7 - 10.5 mg/dL Final    Total Protein 09/12/2022 7.5  6.0 - 8.4 g/dL Final    Albumin 09/12/2022 4.1  3.5 - 5.2 g/dL Final    Total Bilirubin 09/12/2022 0.6  0.1 - 1.0 mg/dL Final    Comment: For infants and newborns, interpretation of results should be based  on gestational age, weight and in agreement with clinical  observations.    Premature Infant recommended reference ranges:  Up to 24 hours.............<8.0 mg/dL  Up to 48 hours............<12.0 mg/dL  3-5 days..................<15.0 mg/dL  6-29 days.................<15.0 mg/dL      Alkaline Phosphatase 09/12/2022 107  55 - 135 U/L Final    AST 09/12/2022 26  10 - 40 U/L Final    ALT 09/12/2022 20  10 - 44 U/L Final    Anion Gap 09/12/2022 8  8 - 16 mmol/L Final    eGFR 09/12/2022 >60  >60 mL/min/1.73 m^2 Final    Cholesterol 09/12/2022 193  120 - 199 mg/dL Final    Comment: The National Cholesterol Education Program (NCEP) has set the  following guidelines (reference ranges) for Cholesterol:  Optimal.....................<200 mg/dL  Borderline High.............200-239 mg/dL  High........................> or = 240 mg/dL      Triglycerides 09/12/2022 94  30 - 150 mg/dL Final    Comment: The National Cholesterol Education Program (NCEP) has set the  following guidelines (reference values) for triglycerides:  Normal......................<150 mg/dL  Borderline High.............150-199 mg/dL  High........................200-499 mg/dL      HDL 09/12/2022 71  40 - 75 mg/dL Final     Comment: The National Cholesterol Education Program (NCEP) has set the  following guidelines (reference values) for HDL Cholesterol:  Low...............<40 mg/dL  Optimal...........>60 mg/dL      LDL Cholesterol 09/12/2022 103.2  63.0 - 159.0 mg/dL Final    Comment: The National Cholesterol Education Program (NCEP) has set the  following guidelines (reference values) for LDL Cholesterol:  Optimal.......................<130 mg/dL  Borderline High...............130-159 mg/dL  High..........................160-189 mg/dL  Very High.....................>190 mg/dL      HDL/Cholesterol Ratio 09/12/2022 36.8  20.0 - 50.0 % Final    Total Cholesterol/HDL Ratio 09/12/2022 2.7  2.0 - 5.0 Final    Non-HDL Cholesterol 09/12/2022 122  mg/dL Final    Comment: Risk category and Non-HDL cholesterol goals:  Coronary heart disease (CHD)or equivalent (10-year risk of CHD >20%):  Non-HDL cholesterol goal     <130 mg/dL  Two or more CHD risk factors and 10-year risk of CHD <= 20%:  Non-HDL cholesterol goal     <160 mg/dL  0 to 1 CHD risk factor:  Non-HDL cholesterol goal     <190 mg/dL             Vinnie Cotter

## 2023-01-19 ENCOUNTER — PES CALL (OUTPATIENT)
Dept: ADMINISTRATIVE | Facility: OTHER | Age: 63
End: 2023-01-19
Payer: MEDICARE

## 2023-05-04 ENCOUNTER — PES CALL (OUTPATIENT)
Dept: ADMINISTRATIVE | Facility: CLINIC | Age: 63
End: 2023-05-04
Payer: MEDICARE

## 2023-06-15 ENCOUNTER — OFFICE VISIT (OUTPATIENT)
Dept: URGENT CARE | Facility: CLINIC | Age: 63
End: 2023-06-15
Payer: MEDICARE

## 2023-06-15 VITALS
RESPIRATION RATE: 16 BRPM | HEIGHT: 69 IN | WEIGHT: 180 LBS | HEART RATE: 98 BPM | SYSTOLIC BLOOD PRESSURE: 132 MMHG | OXYGEN SATURATION: 98 % | DIASTOLIC BLOOD PRESSURE: 86 MMHG | TEMPERATURE: 99 F | BODY MASS INDEX: 26.66 KG/M2

## 2023-06-15 DIAGNOSIS — R05.1 ACUTE COUGH: ICD-10-CM

## 2023-06-15 DIAGNOSIS — J20.8 ACUTE BACTERIAL BRONCHITIS: Primary | ICD-10-CM

## 2023-06-15 DIAGNOSIS — J02.9 SORE THROAT: ICD-10-CM

## 2023-06-15 DIAGNOSIS — B96.89 ACUTE BACTERIAL BRONCHITIS: Primary | ICD-10-CM

## 2023-06-15 LAB
CTP QC/QA: YES
SARS-COV-2 AG RESP QL IA.RAPID: NEGATIVE

## 2023-06-15 PROCEDURE — 87811 SARS-COV-2 COVID19 W/OPTIC: CPT | Mod: QW,S$GLB,,

## 2023-06-15 PROCEDURE — 99214 OFFICE O/P EST MOD 30 MIN: CPT | Mod: S$GLB,,,

## 2023-06-15 PROCEDURE — 71046 XR CHEST PA AND LATERAL: ICD-10-PCS | Mod: S$GLB,,, | Performed by: RADIOLOGY

## 2023-06-15 PROCEDURE — 71046 X-RAY EXAM CHEST 2 VIEWS: CPT | Mod: S$GLB,,, | Performed by: RADIOLOGY

## 2023-06-15 PROCEDURE — 99214 PR OFFICE/OUTPT VISIT, EST, LEVL IV, 30-39 MIN: ICD-10-PCS | Mod: S$GLB,,,

## 2023-06-15 PROCEDURE — 87811 SARS CORONAVIRUS 2 ANTIGEN POCT, MANUAL READ: ICD-10-PCS | Mod: QW,S$GLB,,

## 2023-06-15 RX ORDER — AZITHROMYCIN 250 MG/1
TABLET, FILM COATED ORAL
Qty: 6 TABLET | Refills: 0 | Status: SHIPPED | OUTPATIENT
Start: 2023-06-15 | End: 2023-06-20

## 2023-06-15 RX ORDER — FLUTICASONE PROPIONATE 50 MCG
1 SPRAY, SUSPENSION (ML) NASAL DAILY
Qty: 9.9 ML | Refills: 0 | Status: SHIPPED | OUTPATIENT
Start: 2023-06-15 | End: 2023-07-15

## 2023-06-15 RX ORDER — CETIRIZINE HYDROCHLORIDE 10 MG/1
10 TABLET ORAL DAILY
Qty: 30 TABLET | Refills: 0 | Status: SHIPPED | OUTPATIENT
Start: 2023-06-15 | End: 2023-07-15

## 2023-06-15 RX ORDER — PROMETHAZINE HYDROCHLORIDE AND DEXTROMETHORPHAN HYDROBROMIDE 6.25; 15 MG/5ML; MG/5ML
5 SYRUP ORAL EVERY 4 HOURS PRN
Qty: 180 ML | Refills: 0 | Status: SHIPPED | OUTPATIENT
Start: 2023-06-15 | End: 2023-06-22

## 2023-06-15 NOTE — PROGRESS NOTES
"Subjective:      Patient ID: Roni Espinoza is a 62 y.o. male.    Vitals:  height is 5' 9" (1.753 m) and weight is 81.6 kg (180 lb). His oral temperature is 99.3 °F (37.4 °C). His blood pressure is 132/86 and his pulse is 98. His respiration is 16 and oxygen saturation is 98%.     Chief Complaint: Cough    62 y.o male c/o cough and chest congestion x3 weeks ago. Patient states that he been coughing and feels pain in his chest. Patient also states that he having an hard time breathing. Patient states that he been taking promazine but has no relief.  Patient reports nasal congestion     Cough  This is a new problem. The current episode started 1 to 4 weeks ago. The problem has been unchanged. The problem occurs constantly. The cough is Productive of sputum. Associated symptoms include chest pain, headaches, nasal congestion, postnasal drip, shortness of breath and wheezing. Pertinent negatives include no ear congestion, ear pain, fever, sore throat or weight loss. The symptoms are aggravated by lying down. There is no history of asthma, bronchitis, environmental allergies or pneumonia.     Constitution: Negative for fever.   HENT:  Positive for postnasal drip. Negative for ear pain and sore throat.    Cardiovascular:  Positive for chest pain.   Respiratory:  Positive for cough, shortness of breath and wheezing.    Allergic/Immunologic: Negative for environmental allergies.   Neurological:  Positive for headaches.    Objective:     Physical Exam   Constitutional: He is oriented to person, place, and time. He appears well-developed. He is cooperative.  Non-toxic appearance. He does not appear ill. No distress.      Comments:Patient sits comfortably in exam chair. Answers questions in complete sentences. Does not show any signs of distress or discoloration.        HENT:   Head: Normocephalic and atraumatic.   Ears:   Right Ear: Hearing, tympanic membrane, external ear and ear canal normal. There is cerumen present. " impacted cerumen  Left Ear: Hearing, tympanic membrane, external ear and ear canal normal. There is cerumen present. impacted cerumen  Nose: Rhinorrhea and congestion present. No mucosal edema or nasal deformity. No epistaxis. Right sinus exhibits no maxillary sinus tenderness and no frontal sinus tenderness. Left sinus exhibits no maxillary sinus tenderness and no frontal sinus tenderness.   Mouth/Throat: Uvula is midline and mucous membranes are normal. No trismus in the jaw. Normal dentition. No uvula swelling. Posterior oropharyngeal erythema present. No oropharyngeal exudate or posterior oropharyngeal edema.   Eyes: Conjunctivae and lids are normal. No scleral icterus.   Neck: Trachea normal and phonation normal. Neck supple. No edema present. No erythema present. No neck rigidity present.   Cardiovascular: Normal rate, regular rhythm, normal heart sounds and normal pulses.   Pulmonary/Chest: Effort normal and breath sounds normal. No stridor. No respiratory distress. He has no decreased breath sounds. He has no wheezes. He has no rhonchi. He has no rales.   Abdominal: Normal appearance.   Musculoskeletal: Normal range of motion.         General: No deformity. Normal range of motion.   Lymphadenopathy:     He has no cervical adenopathy.        Right cervical: No superficial cervical, no deep cervical and no posterior cervical adenopathy present.       Left cervical: No superficial cervical, no deep cervical and no posterior cervical adenopathy present.   Neurological: He is alert and oriented to person, place, and time. He exhibits normal muscle tone. Coordination normal.   Skin: Skin is warm, dry, intact, not diaphoretic and not pale.   Psychiatric: His speech is normal and behavior is normal. Judgment and thought content normal.   Nursing note and vitals reviewed.  Results for orders placed or performed in visit on 06/15/23   SARS Coronavirus 2 Antigen, POCT Manual Read   Result Value Ref Range    SARS  Coronavirus 2 Antigen Negative Negative     Acceptable Yes      XR CHEST PA AND LATERAL    Result Date: 6/15/2023  EXAMINATION: XR CHEST PA AND LATERAL CLINICAL HISTORY: Acute cough TECHNIQUE: PA and lateral views of the chest were performed. COMPARISON: Cervical spine series 08/31/2022, lumbar spine series 06/14/2021 FINDINGS: The lungs are symmetrically well expanded and clear, with normal appearance of pulmonary vasculature and no pleural effusion or pneumothorax. The cardiac silhouette is normal in size. Slight tortuosity of the aorta.  Hilar contours are within normal limits. Osseous structures show minimal degenerative change without acute process seen.     No radiographic evidence of pneumonia or other source of acute cough, noting that early/mild viral pneumonia or nonspecific bronchitis may be radiographically occult. Electronically signed by: Shelton Albarran MD Date:    06/15/2023 Time:    14:45     Assessment:     1. Acute bacterial bronchitis    2. Acute cough    3. Sore throat        Plan:       Acute bacterial bronchitis  -     azithromycin (Z-NURIA) 250 MG tablet; Take 2 tablets by mouth on day 1; Take 1 tablet by mouth on days 2-5  Dispense: 6 tablet; Refill: 0  -     cetirizine (ZYRTEC) 10 MG tablet; Take 1 tablet (10 mg total) by mouth once daily.  Dispense: 30 tablet; Refill: 0  -     fluticasone propionate (FLONASE) 50 mcg/actuation nasal spray; 1 spray (50 mcg total) by Each Nostril route once daily.  Dispense: 9.9 mL; Refill: 0  -     promethazine-dextromethorphan (PROMETHAZINE-DM) 6.25-15 mg/5 mL Syrp; Take 5 mLs by mouth every 4 (four) hours as needed (cough).  Dispense: 180 mL; Refill: 0    Acute cough  -     XR CHEST PA AND LATERAL; Future; Expected date: 06/15/2023  -     SARS Coronavirus 2 Antigen, POCT Manual Read    Sore throat  -     (Magic mouthwash) 1:1:1 diphenhydrAMINE(Benadryl) 12.5mg/5ml liq, aluminum & magnesium hydroxide-simethicone (Maalox), LIDOcaine viscous 2%;  Swish and spit 15 mLs every 4 (four) hours as needed (sore throat). Sore throat  Dispense: 180 mL; Refill: 0                  Patient Instructions   - You have been given an antibiotic to treat your condition today.    - Please complete the antibiotic as directed on the bottle.   - you can take over-the-counter probiotics during and after antibiotic use to help preserve gut nate and reduce gastrointestinal symptoms    - Rest.    - Drink plenty of fluids.    - You can take over-the-counter claritin, zyrtec, allegra, or xyzal as directed. These are antihistamines that can help with runny nose, nasal congestion, sneezing, and helps to dry up post-nasal drip, which usually causes sore throat and cough.    - You can take plain Mucinex (guaifenesin) 1200 mg twice a day to help loosen mucous.      - You can use Flonase (fluticasone) nasal spray as directed for sinus congestion and postnasal drip. This is a steroid nasal spray that works locally over time to decrease the inflammation in your nose/sinuses and help with allergic symptoms. This is not an quick- relief spray like afrin, but it works well if used daily.  Discontinue if you develop nose bleed  - Use nasal saline prior to Flonase.  - Use Ocean Spray Nasal Saline 1-3 puffs each nostril every 2-3 hours then blow out onto tissue. This is to irrigate the nasal passage way to clear the sinus openings. Use until sinus problem resolved.    - A Neti Pot with sterile saline can help break up nasal congestion and give relief.      - Warm salt water gargles can help with sore throat     - Warm tea with honey can help with sore throat and cough. Honey is a natural cough suppressant.     - Rest.    - Drink plenty of fluids.    - Acetaminophen (tylenol) or Ibuprofen (advil,motrin) as directed as needed for fever/pain. Avoid tylenol if you have a history of liver disease. Do not take ibuprofen if you have a history of GI bleeding, kidney disease, or if you take blood thinners.      - You must understand that you have received an Urgent Care treatment only and that you may be released before all of your medical problems are known or treated.   - You, the patient, will arrange for follow up care as instructed.   - If your condition worsens or fails to improve we recommend that you receive another evaluation at the ER immediately or contact your PCP to discuss your concerns or return here.   - Follow up with your PCP or specialty clinic as directed in the next 1-2 weeks if not improved or as needed.  You can call [phone] to schedule an appointment with the appropriate provider.    If your symptoms do not improve or worsen, go to the emergency room immediately.

## 2023-06-15 NOTE — PATIENT INSTRUCTIONS
- You have been given an antibiotic to treat your condition today.    - Please complete the antibiotic as directed on the bottle.   - you can take over-the-counter probiotics during and after antibiotic use to help preserve gut nate and reduce gastrointestinal symptoms    - Rest.    - Drink plenty of fluids.    - You can take over-the-counter claritin, zyrtec, allegra, or xyzal as directed. These are antihistamines that can help with runny nose, nasal congestion, sneezing, and helps to dry up post-nasal drip, which usually causes sore throat and cough.    - You can take plain Mucinex (guaifenesin) 1200 mg twice a day to help loosen mucous.      - You can use Flonase (fluticasone) nasal spray as directed for sinus congestion and postnasal drip. This is a steroid nasal spray that works locally over time to decrease the inflammation in your nose/sinuses and help with allergic symptoms. This is not an quick- relief spray like afrin, but it works well if used daily.  Discontinue if you develop nose bleed  - Use nasal saline prior to Flonase.  - Use Ocean Spray Nasal Saline 1-3 puffs each nostril every 2-3 hours then blow out onto tissue. This is to irrigate the nasal passage way to clear the sinus openings. Use until sinus problem resolved.    - A Neti Pot with sterile saline can help break up nasal congestion and give relief.      - Warm salt water gargles can help with sore throat     - Warm tea with honey can help with sore throat and cough. Honey is a natural cough suppressant.     - Rest.    - Drink plenty of fluids.    - Acetaminophen (tylenol) or Ibuprofen (advil,motrin) as directed as needed for fever/pain. Avoid tylenol if you have a history of liver disease. Do not take ibuprofen if you have a history of GI bleeding, kidney disease, or if you take blood thinners.     - You must understand that you have received an Urgent Care treatment only and that you may be released before all of your medical problems are  known or treated.   - You, the patient, will arrange for follow up care as instructed.   - If your condition worsens or fails to improve we recommend that you receive another evaluation at the ER immediately or contact your PCP to discuss your concerns or return here.   - Follow up with your PCP or specialty clinic as directed in the next 1-2 weeks if not improved or as needed.  You can call [phone] to schedule an appointment with the appropriate provider.    If your symptoms do not improve or worsen, go to the emergency room immediately.

## 2023-06-21 ENCOUNTER — PES CALL (OUTPATIENT)
Dept: ADMINISTRATIVE | Facility: CLINIC | Age: 63
End: 2023-06-21
Payer: MEDICARE

## 2023-06-21 RX ORDER — FOLIC ACID-PYRIDOXINE-CYANOCOBALAMIN TAB 2.5-25-2 MG 2.5-25-2 MG
TAB ORAL
Qty: 90 TABLET | Refills: 1 | Status: SHIPPED | OUTPATIENT
Start: 2023-06-21 | End: 2023-10-10 | Stop reason: SDUPTHER

## 2023-07-25 ENCOUNTER — OFFICE VISIT (OUTPATIENT)
Dept: INTERNAL MEDICINE | Facility: CLINIC | Age: 63
End: 2023-07-25
Payer: MEDICARE

## 2023-07-25 ENCOUNTER — LAB VISIT (OUTPATIENT)
Dept: LAB | Facility: HOSPITAL | Age: 63
End: 2023-07-25
Payer: MEDICARE

## 2023-07-25 VITALS
DIASTOLIC BLOOD PRESSURE: 80 MMHG | SYSTOLIC BLOOD PRESSURE: 120 MMHG | WEIGHT: 194 LBS | OXYGEN SATURATION: 98 % | BODY MASS INDEX: 28.73 KG/M2 | HEIGHT: 69 IN | HEART RATE: 83 BPM

## 2023-07-25 DIAGNOSIS — E66.3 OVERWEIGHT: ICD-10-CM

## 2023-07-25 DIAGNOSIS — E78.2 MIXED HYPERLIPIDEMIA: ICD-10-CM

## 2023-07-25 DIAGNOSIS — R79.9 ABNORMAL FINDING OF BLOOD CHEMISTRY, UNSPECIFIED: ICD-10-CM

## 2023-07-25 DIAGNOSIS — Z01.818 PREOPERATIVE CLEARANCE: ICD-10-CM

## 2023-07-25 DIAGNOSIS — F33.3 SEVERE EPISODE OF RECURRENT MAJOR DEPRESSIVE DISORDER, WITH PSYCHOTIC FEATURES: ICD-10-CM

## 2023-07-25 DIAGNOSIS — Z12.5 ENCOUNTER FOR SCREENING FOR MALIGNANT NEOPLASM OF PROSTATE: ICD-10-CM

## 2023-07-25 DIAGNOSIS — I10 ESSENTIAL HYPERTENSION: ICD-10-CM

## 2023-07-25 DIAGNOSIS — M17.12 PRIMARY OSTEOARTHRITIS OF LEFT KNEE: ICD-10-CM

## 2023-07-25 DIAGNOSIS — Z00.00 HEALTHCARE MAINTENANCE: ICD-10-CM

## 2023-07-25 DIAGNOSIS — Z00.00 HEALTHCARE MAINTENANCE: Primary | ICD-10-CM

## 2023-07-25 DIAGNOSIS — G40.309 GENERALIZED CONVULSIVE EPILEPSY WITHOUT INTRACTABLE EPILEPSY: ICD-10-CM

## 2023-07-25 LAB
ALBUMIN SERPL BCP-MCNC: 3.7 G/DL (ref 3.5–5.2)
ALP SERPL-CCNC: 94 U/L (ref 55–135)
ALT SERPL W/O P-5'-P-CCNC: 21 U/L (ref 10–44)
ANION GAP SERPL CALC-SCNC: 11 MMOL/L (ref 8–16)
AST SERPL-CCNC: 27 U/L (ref 10–40)
BASOPHILS # BLD AUTO: 0.02 K/UL (ref 0–0.2)
BASOPHILS NFR BLD: 0.4 % (ref 0–1.9)
BILIRUB SERPL-MCNC: 0.3 MG/DL (ref 0.1–1)
BUN SERPL-MCNC: 9 MG/DL (ref 8–23)
CALCIUM SERPL-MCNC: 9 MG/DL (ref 8.7–10.5)
CHLORIDE SERPL-SCNC: 105 MMOL/L (ref 95–110)
CHOLEST SERPL-MCNC: 163 MG/DL (ref 120–199)
CHOLEST/HDLC SERPL: 2.1 {RATIO} (ref 2–5)
CO2 SERPL-SCNC: 25 MMOL/L (ref 23–29)
COMPLEXED PSA SERPL-MCNC: 1.2 NG/ML (ref 0–4)
CREAT SERPL-MCNC: 1.1 MG/DL (ref 0.5–1.4)
DIFFERENTIAL METHOD: ABNORMAL
EOSINOPHIL # BLD AUTO: 0.1 K/UL (ref 0–0.5)
EOSINOPHIL NFR BLD: 1.6 % (ref 0–8)
ERYTHROCYTE [DISTWIDTH] IN BLOOD BY AUTOMATED COUNT: 14.3 % (ref 11.5–14.5)
EST. GFR  (NO RACE VARIABLE): >60 ML/MIN/1.73 M^2
ESTIMATED AVG GLUCOSE: 108 MG/DL (ref 68–131)
GLUCOSE SERPL-MCNC: 81 MG/DL (ref 70–110)
HBA1C MFR BLD: 5.4 % (ref 4–5.6)
HCT VFR BLD AUTO: 40.7 % (ref 40–54)
HDLC SERPL-MCNC: 77 MG/DL (ref 40–75)
HDLC SERPL: 47.2 % (ref 20–50)
HGB BLD-MCNC: 12.7 G/DL (ref 14–18)
IMM GRANULOCYTES # BLD AUTO: 0.01 K/UL (ref 0–0.04)
IMM GRANULOCYTES NFR BLD AUTO: 0.2 % (ref 0–0.5)
LDLC SERPL CALC-MCNC: 73.8 MG/DL (ref 63–159)
LYMPHOCYTES # BLD AUTO: 1.2 K/UL (ref 1–4.8)
LYMPHOCYTES NFR BLD: 23.1 % (ref 18–48)
MCH RBC QN AUTO: 29.2 PG (ref 27–31)
MCHC RBC AUTO-ENTMCNC: 31.2 G/DL (ref 32–36)
MCV RBC AUTO: 94 FL (ref 82–98)
MONOCYTES # BLD AUTO: 0.5 K/UL (ref 0.3–1)
MONOCYTES NFR BLD: 10.5 % (ref 4–15)
NEUTROPHILS # BLD AUTO: 3.3 K/UL (ref 1.8–7.7)
NEUTROPHILS NFR BLD: 64.2 % (ref 38–73)
NONHDLC SERPL-MCNC: 86 MG/DL
NRBC BLD-RTO: 0 /100 WBC
PLATELET # BLD AUTO: 182 K/UL (ref 150–450)
PMV BLD AUTO: 10.8 FL (ref 9.2–12.9)
POTASSIUM SERPL-SCNC: 4 MMOL/L (ref 3.5–5.1)
PROT SERPL-MCNC: 7.2 G/DL (ref 6–8.4)
RBC # BLD AUTO: 4.35 M/UL (ref 4.6–6.2)
SODIUM SERPL-SCNC: 141 MMOL/L (ref 136–145)
TRIGL SERPL-MCNC: 61 MG/DL (ref 30–150)
WBC # BLD AUTO: 5.15 K/UL (ref 3.9–12.7)

## 2023-07-25 PROCEDURE — 84153 ASSAY OF PSA TOTAL: CPT | Mod: HCNC

## 2023-07-25 PROCEDURE — 85025 COMPLETE CBC W/AUTO DIFF WBC: CPT | Mod: HCNC

## 2023-07-25 PROCEDURE — 83036 HEMOGLOBIN GLYCOSYLATED A1C: CPT | Mod: HCNC

## 2023-07-25 PROCEDURE — 80061 LIPID PANEL: CPT | Mod: HCNC

## 2023-07-25 PROCEDURE — 1160F RVW MEDS BY RX/DR IN RCRD: CPT | Mod: HCNC,CPTII,GC,S$GLB

## 2023-07-25 PROCEDURE — 3008F PR BODY MASS INDEX (BMI) DOCUMENTED: ICD-10-PCS | Mod: HCNC,CPTII,GC,S$GLB

## 2023-07-25 PROCEDURE — 99999 PR PBB SHADOW E&M-EST. PATIENT-LVL IV: CPT | Mod: PBBFAC,HCNC,GC,

## 2023-07-25 PROCEDURE — 1159F MED LIST DOCD IN RCRD: CPT | Mod: HCNC,CPTII,GC,S$GLB

## 2023-07-25 PROCEDURE — 3079F DIAST BP 80-89 MM HG: CPT | Mod: HCNC,CPTII,GC,S$GLB

## 2023-07-25 PROCEDURE — 36415 COLL VENOUS BLD VENIPUNCTURE: CPT | Mod: HCNC

## 2023-07-25 PROCEDURE — 99999 PR PBB SHADOW E&M-EST. PATIENT-LVL IV: ICD-10-PCS | Mod: PBBFAC,HCNC,GC,

## 2023-07-25 PROCEDURE — 3008F BODY MASS INDEX DOCD: CPT | Mod: HCNC,CPTII,GC,S$GLB

## 2023-07-25 PROCEDURE — 1160F PR REVIEW ALL MEDS BY PRESCRIBER/CLIN PHARMACIST DOCUMENTED: ICD-10-PCS | Mod: HCNC,CPTII,GC,S$GLB

## 2023-07-25 PROCEDURE — 3044F PR MOST RECENT HEMOGLOBIN A1C LEVEL <7.0%: ICD-10-PCS | Mod: HCNC,CPTII,GC,S$GLB

## 2023-07-25 PROCEDURE — 99396 PREV VISIT EST AGE 40-64: CPT | Mod: HCNC,GC,S$GLB,

## 2023-07-25 PROCEDURE — 3044F HG A1C LEVEL LT 7.0%: CPT | Mod: HCNC,CPTII,GC,S$GLB

## 2023-07-25 PROCEDURE — 3074F SYST BP LT 130 MM HG: CPT | Mod: HCNC,CPTII,GC,S$GLB

## 2023-07-25 PROCEDURE — 3079F PR MOST RECENT DIASTOLIC BLOOD PRESSURE 80-89 MM HG: ICD-10-PCS | Mod: HCNC,CPTII,GC,S$GLB

## 2023-07-25 PROCEDURE — 3074F PR MOST RECENT SYSTOLIC BLOOD PRESSURE < 130 MM HG: ICD-10-PCS | Mod: HCNC,CPTII,GC,S$GLB

## 2023-07-25 PROCEDURE — 1159F PR MEDICATION LIST DOCUMENTED IN MEDICAL RECORD: ICD-10-PCS | Mod: HCNC,CPTII,GC,S$GLB

## 2023-07-25 PROCEDURE — 99396 PR PREVENTIVE VISIT,EST,40-64: ICD-10-PCS | Mod: HCNC,GC,S$GLB,

## 2023-07-25 PROCEDURE — 80053 COMPREHEN METABOLIC PANEL: CPT | Mod: HCNC

## 2023-07-25 NOTE — PROGRESS NOTES
"  Subjective     Chief Complaint: "annual physical"    History of Present Illness:  Mr. Roni Espinoza is a 62 y.o. male with hx of HTN, HLD, seizure disorder on lamictal, MDD on risperdal who presents for his annual physical exam. Patient reports he is overall doing well today. He reports chronic L knee pain due to severe osteoarthritis. He is following with an orthopedic surgeon at [facility] and is scheduled for L TKA on 8/14.    Otherwise patient has no acute complaints. He is compliant with his medications. No seizures in "many many years" patient can't remember when his last seizure was, though he states it has been several years. His mood is well controlled on Risperdal and sertraline. He follows with Dr. Cotter in Psychiatry and those sessions are going well per patient. He lives at home with his wife. He tries to eat a healthy diet overall, however admits he sometimes eats more fried chicken than he should.     Review of Systems   Constitutional:  Negative for chills and fever.   HENT:  Negative for congestion and sore throat.    Respiratory:  Negative for cough and shortness of breath.    Cardiovascular:  Negative for chest pain and leg swelling.   Gastrointestinal:  Negative for abdominal pain, constipation, diarrhea, nausea and vomiting.   Musculoskeletal:  Positive for joint pain (L knee pain). Negative for myalgias.   Neurological:  Negative for dizziness and loss of consciousness.   Psychiatric/Behavioral:  Negative for depression.      PAST HISTORY:     Past Medical History:   Diagnosis Date    Allergy     Anxiety     Depression     Headache(784.0)     History of psychiatric hospitalization     Hx of psychiatric care     Hypertension     Psychiatric problem     Seizures     last seizure 2006    Therapy        Past Surgical History:   Procedure Laterality Date    BRAIN SURGERY  1990    AV malformation repair    COLONOSCOPY N/A 10/3/2016    Procedure: COLONOSCOPY;  Surgeon: Ehsan Odom MD;  Location: " [facility] (4TH FLR);  Service: Endoscopy;  Laterality: N/A;  ok for me or wes to do colonoscopy per patient would like asap       Family History   Problem Relation Age of Onset    Heart attack Brother 62    Cancer Brother     Cancer Mother     Thyroid disease Daughter     Heart disease Daughter     No Known Problems Son     Cancer Brother     Alcohol abuse Maternal Uncle     Depression Maternal Uncle     Celiac disease Neg Hx     Colon cancer Neg Hx     Esophageal cancer Neg Hx        Social History     Socioeconomic History    Marital status:    Tobacco Use    Smoking status: Never    Smokeless tobacco: Never   Substance and Sexual Activity    Alcohol use: No     Alcohol/week: 0.0 standard drinks    Drug use: No    Sexual activity: Yes     Partners: Female     Birth control/protection: Post-menopausal       MEDICATIONS & ALLERGIES:     Current Outpatient Medications on File Prior to Visit   Medication Sig    amLODIPine (NORVASC) 5 MG tablet Take 1 tablet (5 mg total) by mouth once daily.    cetirizine (ZYRTEC) 10 MG tablet Take 1 tablet (10 mg total) by mouth once daily.    FOLBIC 2.5-25-2 mg Tab TAKE 1 TABLET EVERY DAY    hydrOXYzine pamoate (VISTARIL) 25 MG Cap TAKE 1 OR 2 CAPSULES AT BEDTIME AS NEEDED FOR SLEEP    lamoTRIgine (LAMICTAL) 200 MG tablet TAKE 1 TABLET ONE TIME DAILY    meloxicam (MOBIC) 15 MG tablet Take 1 tablet (15 mg total) by mouth once daily.    pantoprazole (PROTONIX) 40 MG tablet take 1 tablet by mouth every morning BEFORE BREAKFAST    risperiDONE (RISPERDAL) 1 MG tablet Take 1 tablet (1 mg total) by mouth every evening.    rosuvastatin (CRESTOR) 20 MG tablet Take 1 tablet (20 mg total) by mouth once daily.    sars-cov-2, covid-19, (MODERNA COVID-19) 50 mcg/0.25 ml injection (BOOSTER) Inject into the muscle.    sars-cov-2, covid-19, (MODERNA COVID-19) 50 mcg/0.25 ml injection (BOOSTER) Inject into the muscle.    sertraline (ZOLOFT) 100 MG tablet Take 1 tablet (100 mg total) by  "mouth once daily.     No current facility-administered medications on file prior to visit.       Review of patient's allergies indicates:  No Known Allergies    OBJECTIVE:     Vital Signs:  Vitals:    07/25/23 1037   BP: 120/80   Pulse: 83   SpO2: 98%   Weight: 88 kg (194 lb 0.1 oz)   Height: 5' 9" (1.753 m)       Body mass index is 28.65 kg/m².     Physical Exam  Vitals and nursing note reviewed.   Constitutional:       General: He is not in acute distress.     Appearance: Normal appearance. He is not ill-appearing, toxic-appearing or diaphoretic.   HENT:      Head: Normocephalic and atraumatic.      Nose: Nose normal. No congestion.      Mouth/Throat:      Mouth: Mucous membranes are moist.      Pharynx: Oropharynx is clear.   Eyes:      Extraocular Movements: Extraocular movements intact.      Conjunctiva/sclera: Conjunctivae normal.      Pupils: Pupils are equal, round, and reactive to light.   Cardiovascular:      Rate and Rhythm: Normal rate and regular rhythm.      Pulses: Normal pulses.      Heart sounds: Normal heart sounds. No murmur heard.    No friction rub. No gallop.   Pulmonary:      Effort: Pulmonary effort is normal. No respiratory distress.      Breath sounds: Normal breath sounds.   Abdominal:      General: Abdomen is flat. Bowel sounds are normal. There is no distension.      Palpations: Abdomen is soft.      Tenderness: There is no abdominal tenderness.   Musculoskeletal:         General: Swelling and tenderness present. Normal range of motion.      Cervical back: Normal range of motion and neck supple.   Skin:     General: Skin is warm and dry.   Neurological:      General: No focal deficit present.      Mental Status: He is alert and oriented to person, place, and time.   Psychiatric:         Mood and Affect: Mood normal.         Behavior: Behavior normal.       Laboratory  No results found for this or any previous visit (from the past 24 hour(s)).    Diagnostic Results:      Health " Maintenance Due   Topic Date Due    Hemoglobin A1c (Diabetic Prevention Screening)  04/21/2019    Colorectal Cancer Screening  10/03/2021    COVID-19 Vaccine (5 - Booster for Moderna series) 06/16/2022         ASSESSMENT & PLAN:     Mr. Roni Espinoza is a 62 y.o. male with hx of HTN, HLD, MDD, seizure disorder who presents to [facility] care.     1. Healthcare maintenance  - Last Colonoscopy 2016 with recommendation to repeat in 5 years.   -     COMPREHENSIVE METABOLIC PANEL; Future; Expected date: 07/25/2023  -     PSA, SCREENING; Future; Expected date: 07/25/2023  -     Ambulatory referral/consult to Endo Procedure ; Future; Expected date: 07/26/2023  -     CBC W/ AUTO DIFFERENTIAL; Future; Expected date: 07/25/2023  -     HEMOGLOBIN A1C; Future; Expected date: 07/25/2023    2. Preoperative Risk assessment  -     CBC W/ AUTO DIFFERENTIAL; Future; Expected date: 07/25/2023    3. Essential hypertension         - BP well controlled on amlodipine 5mg today. Will continue current dose. Advised healthy diet options and avoiding excess salt intake.     4. 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 Has been many years since last seizure.     5. Major depressive disorder, recurrent episode, severe, specified as with psychotic behavior  Overview:  Dx updated per 2019 IMO Load    -- well controlled on Risperdal and sertraline. Follows with Dr. Cotter, has upcoming appt on 10/10/23.       6. Mixed hyperlipidemia  -     LIPID PANEL; Future; Expected date: 07/25/2023  -     COMPREHENSIVE METABOLIC PANEL; Future; Expected date: 07/25/2023  -     HEMOGLOBIN A1C; Future; Expected date: 07/25/2023    7. Primary osteoarthritis of left knee        - Scheduled for TKA of L knee on 8/14/23.     8. Encounter for screening for malignant neoplasm of prostate  -     PSA, SCREENING; Future; Expected date: 07/25/2023    9. Overweight  -     HEMOGLOBIN A1C; Future; Expected  date: 07/25/2023    10. Abnormal finding of blood chemistry, unspecified  -     HEMOGLOBIN A1C; Future; Expected date: 07/25/2023         Surgical Risk Assessment   Active cardiac issues:  Active decompensated heart failure? No   Unstable angina?  No   Significant uncontrolled arrhythmias? No   Severe valvular heart disease-Aortic or Mitral Stenosis? No   Recent MI or coronary revascularization < 30 days? No     Cardiac Risk Factors  History of CAD/ischemic heart disease? No   History of cerebrovascular disease? No   History of compensated heart failure? No   Type 2 diabetes requiring insulin? No   Serum Creatinine > 2? No   Total cardiac risk factors 0     Functional mets >4    < 4* METs -unable to walk > 2 blocks on level ground without stopping due to symptoms  - eating, dressing, toileting, walking indoors, light housework. POOR   > 4* METs -climbing > 1 flight of stairs without stopping  -walking up hill > 1-2 blocks  -scrubbing floors  -moving furniture  - golf, bowling, dancing or tennis  -running short distance MODERATE to EXCELLENT   * performance of any one of the activities     Assessment/Plan:   Cardiovascular Risk Assessment:  Non-emergent surgery.  No active cardiac problems (such as unstable angina, decompensated heart failure, significant uncontrolled arrhythmias or severe valvular disease).  Surgery is low risk  Functional Status: able to climb a flight of stairs (> 4 METS)    Revised Cardiac Risk Index   1. History of ischemic heart disease   2. History of congestive heart failure   3. History of cerebrovascular disease (stroke or transient ischemic attack)   4. History of diabetes requiring preoperative insulin use 5. Chronic kidney disease (creatinine > 2 mg/dL)   5. Undergoing suprainguinal vascular, intraperitoneal, or intrathoracic surgery Risk for cardiac death, nonfatal myocardial infarction, and nonfatal cardiac arrest     0 predictors = 0.4%, 1 predictor = 0.9%, 2 predictors = 6.6%, ?3  predictors = >11%    RCRI Calculator Class and Risk percentage: 0 predictors; 0.4%    Other Issues: None    -------------------------------------------------------------------------------    RTC in 6 months or sooner if needed.     Discussed with Dr. Thompson  - staff attestation to follow      Laron Kaur MD  Internal Medicine PGY-3  [facility]  [address], [place] [id number] [phone]

## 2023-10-05 DIAGNOSIS — F39 MOOD DISORDER: ICD-10-CM

## 2023-10-06 RX ORDER — RISPERIDONE 1 MG/1
1 TABLET ORAL NIGHTLY
Qty: 90 TABLET | Refills: 0 | Status: SHIPPED | OUTPATIENT
Start: 2023-10-06 | End: 2023-10-10 | Stop reason: SDUPTHER

## 2023-10-10 ENCOUNTER — OFFICE VISIT (OUTPATIENT)
Dept: PSYCHIATRY | Facility: CLINIC | Age: 63
End: 2023-10-10
Payer: MEDICARE

## 2023-10-10 VITALS
BODY MASS INDEX: 27.77 KG/M2 | DIASTOLIC BLOOD PRESSURE: 74 MMHG | WEIGHT: 188.06 LBS | SYSTOLIC BLOOD PRESSURE: 131 MMHG | HEART RATE: 80 BPM

## 2023-10-10 DIAGNOSIS — F39 MOOD DISORDER: ICD-10-CM

## 2023-10-10 PROCEDURE — 3044F HG A1C LEVEL LT 7.0%: CPT | Mod: HCNC,CPTII,S$GLB, | Performed by: PSYCHIATRY & NEUROLOGY

## 2023-10-10 PROCEDURE — 3075F SYST BP GE 130 - 139MM HG: CPT | Mod: HCNC,CPTII,S$GLB, | Performed by: PSYCHIATRY & NEUROLOGY

## 2023-10-10 PROCEDURE — 99999 PR PBB SHADOW E&M-EST. PATIENT-LVL I: ICD-10-PCS | Mod: PBBFAC,HCNC,, | Performed by: PSYCHIATRY & NEUROLOGY

## 2023-10-10 PROCEDURE — 3078F DIAST BP <80 MM HG: CPT | Mod: HCNC,CPTII,S$GLB, | Performed by: PSYCHIATRY & NEUROLOGY

## 2023-10-10 PROCEDURE — 99214 PR OFFICE/OUTPT VISIT, EST, LEVL IV, 30-39 MIN: ICD-10-PCS | Mod: HCNC,95,S$GLB, | Performed by: PSYCHIATRY & NEUROLOGY

## 2023-10-10 PROCEDURE — 3008F BODY MASS INDEX DOCD: CPT | Mod: HCNC,CPTII,S$GLB, | Performed by: PSYCHIATRY & NEUROLOGY

## 2023-10-10 PROCEDURE — 99999 PR PBB SHADOW E&M-EST. PATIENT-LVL I: CPT | Mod: PBBFAC,HCNC,, | Performed by: PSYCHIATRY & NEUROLOGY

## 2023-10-10 PROCEDURE — 3008F PR BODY MASS INDEX (BMI) DOCUMENTED: ICD-10-PCS | Mod: HCNC,CPTII,S$GLB, | Performed by: PSYCHIATRY & NEUROLOGY

## 2023-10-10 PROCEDURE — 3075F PR MOST RECENT SYSTOLIC BLOOD PRESS GE 130-139MM HG: ICD-10-PCS | Mod: HCNC,CPTII,S$GLB, | Performed by: PSYCHIATRY & NEUROLOGY

## 2023-10-10 PROCEDURE — 99214 OFFICE O/P EST MOD 30 MIN: CPT | Mod: HCNC,95,S$GLB, | Performed by: PSYCHIATRY & NEUROLOGY

## 2023-10-10 PROCEDURE — 3078F PR MOST RECENT DIASTOLIC BLOOD PRESSURE < 80 MM HG: ICD-10-PCS | Mod: HCNC,CPTII,S$GLB, | Performed by: PSYCHIATRY & NEUROLOGY

## 2023-10-10 PROCEDURE — 3044F PR MOST RECENT HEMOGLOBIN A1C LEVEL <7.0%: ICD-10-PCS | Mod: HCNC,CPTII,S$GLB, | Performed by: PSYCHIATRY & NEUROLOGY

## 2023-10-10 RX ORDER — SERTRALINE HYDROCHLORIDE 100 MG/1
100 TABLET, FILM COATED ORAL DAILY
Qty: 90 TABLET | Refills: 1 | Status: SHIPPED | OUTPATIENT
Start: 2023-10-10 | End: 2024-01-23 | Stop reason: SDUPTHER

## 2023-10-10 RX ORDER — FOLIC ACID-PYRIDOXINE-CYANOCOBALAMIN TAB 2.5-25-2 MG 2.5-25-2 MG
1 TAB ORAL DAILY
Qty: 90 TABLET | Refills: 1 | Status: SHIPPED | OUTPATIENT
Start: 2023-10-10 | End: 2024-01-23 | Stop reason: SDUPTHER

## 2023-10-10 RX ORDER — RISPERIDONE 1 MG/1
1 TABLET ORAL NIGHTLY
Qty: 90 TABLET | Refills: 1 | Status: SHIPPED | OUTPATIENT
Start: 2023-10-10 | End: 2024-01-23 | Stop reason: SDUPTHER

## 2023-10-10 RX ORDER — LAMOTRIGINE 200 MG/1
TABLET ORAL
Qty: 90 TABLET | Refills: 1 | Status: SHIPPED | OUTPATIENT
Start: 2023-10-10 | End: 2024-01-23 | Stop reason: SDUPTHER

## 2023-10-10 NOTE — PROGRESS NOTES
ESTABLISHED OUTPATIENT VISIT   E/M LEVEL 4: [id number]    ENCOUNTER DATE: 10/10/2023  SITE: [facility], [address]    HISTORY    CHIEF COMPLAINT   Roni Espinoza is a 62 y.o. male who presents for follow up of Cognitive d/o N.O.S., Mood d/o N.O.S., Psychosis N.O.S.  .    HPI     Denies recent seizure.    Hydroxyzine remains helpful for sleep.    Pt. Denies recent paranoid feelings.    He again appears psychiatrically stable during today's visit. Shows a sense of humor.    Wife Jenniefr was called during today's visit: she states, that the patient has been doing very well psychiatrically, has been taking his meds including Lamictal.    On exam today no EPS noted.    Psychiatric Review Of Systems - Is patient experiencing or having changes in:  sleep: good, takes Hydroxyzine prn  appetite: no  Weight: no  energy/anergy: no  interest/pleasure/anhedonia: no  somatic symptoms: no  libido: no  anxiety/panic: no  guilty/hopelessness: no  concentration: no  S.I.B.s/risky behavior: no  Irritability: no  Racing thoughts: no  Impulsive behaviors: no  Paranoia:no  AVH:no    Recent alcohol: occasional small amount  Recent drug: no    Medical ROS   Reports s/p left knee replacement     PAST MEDICAL, FAMILY AND SOCIAL HISTORY: The patient's past medical, family and social history have been reviewed and updated as appropriate within the electronic medical record - see encounter notes.    PSYCHOTROPIC MEDICATIONS   Lamictal 200 mg qam, Zoloft 100 mg qam, Risperdal 1 mg at bedtime, Hydroxyzine 25- 50 mg at bedtime prn[takes occasionally], Folbic    EXAMINATION    Vitals:    10/10/23 1301   BP: 131/74   Pulse: 80     /74   Pulse 80   Wt 85.3 kg (188 lb 0.8 oz)   BMI 27.77 kg/m²     CONSTITUTIONAL  General Appearance: well nourished    MUSCULOSKELETAL  Muscle Strength and Tone: normal strength and tone  Abnormal Involuntary Movements: no abnormal movement noted  Gait and Station: normal gait    PSYCHIATRIC   Level of  Consciousness: alert  Orientation: knows correct day of the week and date  Grooming: well groomed  Psychomotor Behavior: no restlessness/agitation  Speech: normal in rate, rhythm and volume  Language: normal vocabulary  Mood: steady  Affect: appropriate  Thought Process: logical  Associations: intact associations  Thought Content: no SI/HI  Memory: grossly intact  Attention: intact to content of interview  Fund of Knowledge: appears adequate  Insight: good  Judgement: good    MEDICAL DECISION MAKING    DIAGNOSES  Cognitive d/o N.O.S., Depressive d/o unspecified, Psychosis N.O.S.    PROBLEM LIST AND MANAGEMENT PLANS    - depressive d/o, psychosis: continue above meds  - rtc 3 months     Time with patient: 20 min    LABORATORY DATA  Lab Visit on 07/25/2023   Component Date Value Ref Range Status    Cholesterol 07/25/2023 163  120 - 199 mg/dL Final    Comment: The National Cholesterol Education Program (NCEP) has set the  following guidelines (reference ranges) for Cholesterol:  Optimal.....................<200 mg/dL  Borderline High.............200-239 mg/dL  High........................> or = 240 mg/dL      Triglycerides 07/25/2023 61  30 - 150 mg/dL Final    Comment: The National Cholesterol Education Program (NCEP) has set the  following guidelines (reference values) for triglycerides:  Normal......................<150 mg/dL  Borderline High.............150-199 mg/dL  High........................200-499 mg/dL      HDL 07/25/2023 77 (H)  40 - 75 mg/dL Final    Comment: The National Cholesterol Education Program (NCEP) has set the  following guidelines (reference values) for HDL Cholesterol:  Low...............<40 mg/dL  Optimal...........>60 mg/dL      LDL Cholesterol 07/25/2023 73.8  63.0 - 159.0 mg/dL Final    Comment: The National Cholesterol Education Program (NCEP) has set the  following guidelines (reference values) for LDL Cholesterol:  Optimal.......................<130 mg/dL  Borderline  High...............130-159 mg/dL  High..........................160-189 mg/dL  Very High.....................>190 mg/dL      HDL/Cholesterol Ratio 07/25/2023 47.2  20.0 - 50.0 % Final    Total Cholesterol/HDL Ratio 07/25/2023 2.1  2.0 - 5.0 Final    Non-HDL Cholesterol 07/25/2023 86  mg/dL Final    Comment: Risk category and Non-HDL cholesterol goals:  Coronary heart disease (CHD)or equivalent (10-year risk of CHD >20%):  Non-HDL cholesterol goal     <130 mg/dL  Two or more CHD risk factors and 10-year risk of CHD <= 20%:  Non-HDL cholesterol goal     <160 mg/dL  0 to 1 CHD risk factor:  Non-HDL cholesterol goal     <190 mg/dL      Sodium 07/25/2023 141  136 - 145 mmol/L Final    Potassium 07/25/2023 4.0  3.5 - 5.1 mmol/L Final    Chloride 07/25/2023 105  95 - 110 mmol/L Final    CO2 07/25/2023 25  23 - 29 mmol/L Final    Glucose 07/25/2023 81  70 - 110 mg/dL Final    BUN 07/25/2023 9  8 - 23 mg/dL Final    Creatinine 07/25/2023 1.1  0.5 - 1.4 mg/dL Final    Calcium 07/25/2023 9.0  8.7 - 10.5 mg/dL Final    Total Protein 07/25/2023 7.2  6.0 - 8.4 g/dL Final    Albumin 07/25/2023 3.7  3.5 - 5.2 g/dL Final    Total Bilirubin 07/25/2023 0.3  0.1 - 1.0 mg/dL Final    Comment: For infants and newborns, interpretation of results should be based  on gestational age, weight and in agreement with clinical  observations.    Premature Infant recommended reference ranges:  Up to 24 hours.............<8.0 mg/dL  Up to 48 hours............<12.0 mg/dL  3-5 days..................<15.0 mg/dL  6-29 days.................<15.0 mg/dL      Alkaline Phosphatase 07/25/2023 94  55 - 135 U/L Final    AST 07/25/2023 27  10 - 40 U/L Final    ALT 07/25/2023 21  10 - 44 U/L Final    eGFR 07/25/2023 >60.0  >60 mL/min/1.73 m^2 Final    Anion Gap 07/25/2023 11  8 - 16 mmol/L Final    PSA, Screen 07/25/2023 1.2  0.00 - 4.00 ng/mL Final    Comment: The testing method is a chemiluminescent microparticle immunoassay   manufactured by [employer] and performed on the    or   HERMEL DELOR system. Values obtained with different assay manufacturers   for   methods may be different and cannot be used interchangeably.  PSA Expected levels:  Hormonal Therapy: <0.05 ng/ml  Prostatectomy: <0.01 ng/ml  Radiation Therapy: <1.00 ng/ml      WBC 07/25/2023 5.15  3.90 - 12.70 K/uL Final    RBC 07/25/2023 4.35 (L)  4.60 - 6.20 M/uL Final    Hemoglobin 07/25/2023 12.7 (L)  14.0 - 18.0 g/dL Final    Hematocrit 07/25/2023 40.7  40.0 - 54.0 % Final    MCV 07/25/2023 94  82 - 98 fL Final    MCH 07/25/2023 29.2  27.0 - 31.0 pg Final    MCHC 07/25/2023 31.2 (L)  32.0 - 36.0 g/dL Final    RDW 07/25/2023 14.3  11.5 - 14.5 % Final    Platelets 07/25/2023 182  150 - 450 K/uL Final    MPV 07/25/2023 10.8  9.2 - 12.9 fL Final    Immature Granulocytes 07/25/2023 0.2  0.0 - 0.5 % Final    Gran # (ANC) 07/25/2023 3.3  1.8 - 7.7 K/uL Final    Immature Grans (Abs) 07/25/2023 0.01  0.00 - 0.04 K/uL Final    Comment: Mild elevation in immature granulocytes is non specific and   can be seen in a variety of conditions including stress response,   acute inflammation, trauma and pregnancy. Correlation with other   laboratory and clinical findings is essential.      Lymph # 07/25/2023 1.2  1.0 - 4.8 K/uL Final    Mono # 07/25/2023 0.5  0.3 - 1.0 K/uL Final    Eos # 07/25/2023 0.1  0.0 - 0.5 K/uL Final    Baso # 07/25/2023 0.02  0.00 - 0.20 K/uL Final    nRBC 07/25/2023 0  0 /100 WBC Final    Gran % 07/25/2023 64.2  38.0 - 73.0 % Final    Lymph % 07/25/2023 23.1  18.0 - 48.0 % Final    Mono % 07/25/2023 10.5  4.0 - 15.0 % Final    Eosinophil % 07/25/2023 1.6  0.0 - 8.0 % Final    Basophil % 07/25/2023 0.4  0.0 - 1.9 % Final    Differential Method 07/25/2023 Automated   Final    Hemoglobin A1C 07/25/2023 5.4  4.0 - 5.6 % Final    Comment: ADA Screening Guidelines:  5.7-6.4%  Consistent with prediabetes  >or=6.5%  Consistent with diabetes    High levels of fetal hemoglobin interfere with the  HbA1C  assay. Heterozygous hemoglobin variants (HbS, HgC, etc)do  not significantly interfere with this assay.   However, presence of multiple variants may affect accuracy.      Estimated Avg Glucose 07/25/2023 108  68 - 131 mg/dL Final           Vinnie Cotter

## 2023-10-18 ENCOUNTER — PATIENT MESSAGE (OUTPATIENT)
Dept: PSYCHIATRY | Facility: CLINIC | Age: 63
End: 2023-10-18
Payer: MEDICARE

## 2023-10-18 DIAGNOSIS — I10 ESSENTIAL HYPERTENSION: ICD-10-CM

## 2023-10-19 RX ORDER — AMLODIPINE BESYLATE 5 MG/1
5 TABLET ORAL DAILY
Qty: 90 TABLET | Refills: 3 | Status: SHIPPED | OUTPATIENT
Start: 2023-10-19

## 2023-10-27 ENCOUNTER — PATIENT MESSAGE (OUTPATIENT)
Dept: INTERNAL MEDICINE | Facility: CLINIC | Age: 63
End: 2023-10-27
Payer: MEDICARE

## 2023-11-22 ENCOUNTER — CLINICAL SUPPORT (OUTPATIENT)
Dept: ENDOSCOPY | Facility: HOSPITAL | Age: 63
End: 2023-11-22
Payer: MEDICARE

## 2023-11-22 DIAGNOSIS — Z00.00 HEALTHCARE MAINTENANCE: ICD-10-CM

## 2023-11-22 NOTE — PLAN OF CARE
Patient stated he is not ready to schedule at this time.  Main line phone number provided to return call.

## 2023-12-04 RX ORDER — HYDROXYZINE PAMOATE 25 MG/1
CAPSULE ORAL
Qty: 180 CAPSULE | Refills: 0 | Status: SHIPPED | OUTPATIENT
Start: 2023-12-04

## 2023-12-19 ENCOUNTER — TELEPHONE (OUTPATIENT)
Dept: ENDOSCOPY | Facility: HOSPITAL | Age: 63
End: 2023-12-19
Payer: MEDICARE

## 2023-12-19 VITALS — WEIGHT: 187 LBS | HEIGHT: 69 IN | BODY MASS INDEX: 27.7 KG/M2

## 2023-12-19 DIAGNOSIS — Z00.00 HEALTHCARE MAINTENANCE: Primary | ICD-10-CM

## 2023-12-19 DIAGNOSIS — Z12.11 SCREEN FOR COLON CANCER: Primary | ICD-10-CM

## 2023-12-19 DIAGNOSIS — Z12.11 SCREEN FOR COLON CANCER: ICD-10-CM

## 2023-12-19 RX ORDER — SODIUM, POTASSIUM,MAG SULFATES 17.5-3.13G
1 SOLUTION, RECONSTITUTED, ORAL ORAL DAILY
Qty: 1 KIT | Refills: 0 | Status: SHIPPED | OUTPATIENT
Start: 2023-12-19 | End: 2023-12-21

## 2023-12-19 NOTE — TELEPHONE ENCOUNTER
Healthcare maintenance [Z00.00]    Order Class: Internal Referral         Order Specific Questions   What procedure is to be performed?   Screening Colonoscopy            CPT Code:   COLON CA SCRN NOT HI RSK IND []

## 2023-12-19 NOTE — TELEPHONE ENCOUNTER
Spoke to Jennifer pt wife to schedule procedure(s) Colonoscopy       Physician to perform procedure(s) Dr. HUBER Odom  Date of Procedure (s) 03/23/23  Arrival Time 7:45 AM  Time of Procedure(s) 8:45 AM   Location of Procedure(s) [facility] [address] [facility]  Type of Rx Prep sent to patient: Suprep  Instructions provided to patient via [facility]    Patient was informed on the following information and verbalized understanding. Screening questionnaire reviewed with patient and complete. If procedure requires anesthesia, a responsible adult needs to be present to accompany the patient home, patient cannot drive after receiving anesthesia. Appointment details are tentative, especially check-in time. Patient will receive a prep-op call 7 days prior to confirm check-in time for procedure. If applicable the patient should contact their pharmacy to verify Rx for procedure prep is ready for pick-up. Patient was advised to call the scheduling department at [phone] if pharmacy states no Rx is available. Patient was advised to call the endoscopy scheduling department if any questions or concerns arise.      [facility] Scheduling Department

## 2024-01-23 ENCOUNTER — OFFICE VISIT (OUTPATIENT)
Dept: PSYCHIATRY | Facility: CLINIC | Age: 64
End: 2024-01-23
Payer: MEDICARE

## 2024-01-23 VITALS
DIASTOLIC BLOOD PRESSURE: 80 MMHG | HEART RATE: 76 BPM | SYSTOLIC BLOOD PRESSURE: 138 MMHG | WEIGHT: 190.81 LBS | BODY MASS INDEX: 28.18 KG/M2

## 2024-01-23 DIAGNOSIS — F39 MOOD DISORDER: ICD-10-CM

## 2024-01-23 PROCEDURE — 99999 PR PBB SHADOW E&M-EST. PATIENT-LVL I: CPT | Mod: PBBFAC,HCNC,, | Performed by: PSYCHIATRY & NEUROLOGY

## 2024-01-23 PROCEDURE — 3008F BODY MASS INDEX DOCD: CPT | Mod: HCNC,CPTII,S$GLB, | Performed by: PSYCHIATRY & NEUROLOGY

## 2024-01-23 PROCEDURE — 99214 OFFICE O/P EST MOD 30 MIN: CPT | Mod: HCNC,S$GLB,, | Performed by: PSYCHIATRY & NEUROLOGY

## 2024-01-23 PROCEDURE — 3079F DIAST BP 80-89 MM HG: CPT | Mod: HCNC,CPTII,S$GLB, | Performed by: PSYCHIATRY & NEUROLOGY

## 2024-01-23 PROCEDURE — 3075F SYST BP GE 130 - 139MM HG: CPT | Mod: HCNC,CPTII,S$GLB, | Performed by: PSYCHIATRY & NEUROLOGY

## 2024-01-23 RX ORDER — SERTRALINE HYDROCHLORIDE 100 MG/1
100 TABLET, FILM COATED ORAL DAILY
Qty: 90 TABLET | Refills: 1 | Status: SHIPPED | OUTPATIENT
Start: 2024-01-23

## 2024-01-23 RX ORDER — LAMOTRIGINE 200 MG/1
TABLET ORAL
Qty: 90 TABLET | Refills: 1 | Status: SHIPPED | OUTPATIENT
Start: 2024-01-23

## 2024-01-23 RX ORDER — RISPERIDONE 1 MG/1
1 TABLET ORAL NIGHTLY
Qty: 90 TABLET | Refills: 1 | Status: SHIPPED | OUTPATIENT
Start: 2024-01-23

## 2024-01-23 RX ORDER — FOLIC ACID-PYRIDOXINE-CYANOCOBALAMIN TAB 2.5-25-2 MG 2.5-25-2 MG
1 TAB ORAL DAILY
Qty: 90 TABLET | Refills: 1 | Status: SHIPPED | OUTPATIENT
Start: 2024-01-23

## 2024-01-23 NOTE — PROGRESS NOTES
ESTABLISHED OUTPATIENT VISIT   E/M LEVEL 4: [id number]    ENCOUNTER DATE: 1/23/2024  SITE: [facility], [address]    HISTORY    CHIEF COMPLAINT   Roni Espinoza is a 63 y.o. male who presents for follow up of Cognitive d/o N.O.S., Mood d/o N.O.S., Psychosis N.O.S.  .    HPI     Denies recent seizure.    Pt. Denies recent paranoid feelings.    He again appears psychiatrically stable during today's visit. Shows a sense of humor.    Some conflict with daughter.    On exam today no EPS noted.    Psychiatric Review Of Systems - Is patient experiencing or having changes in:  sleep: good, takes Hydroxyzine prn  appetite: no  Weight: no  energy/anergy: no  interest/pleasure/anhedonia: no  somatic symptoms: no  libido: no  anxiety/panic: no  guilty/hopelessness: no  concentration: no  S.I.B.s/risky behavior: no  Irritability: no  Racing thoughts: no  Impulsive behaviors: no  Paranoia:no  AVH:no    Recent alcohol: occasional small amount  Recent drug: no    Medical ROS   Reports s/p left knee replacement     PAST MEDICAL, FAMILY AND SOCIAL HISTORY: The patient's past medical, family and social history have been reviewed and updated as appropriate within the electronic medical record - see encounter notes.    PSYCHOTROPIC MEDICATIONS   Lamictal 200 mg qam, Zoloft 100 mg qam, Risperdal 1 mg at bedtime, Hydroxyzine 25- 50 mg at bedtime prn[takes occasionally], Folbic    EXAMINATION    Vitals:    01/23/24 1342   BP: 138/80   Pulse: 76     /80   Pulse 76   Wt 86.5 kg (190 lb 12.9 oz)   BMI 28.18 kg/m²     CONSTITUTIONAL  General Appearance: well nourished    MUSCULOSKELETAL  Muscle Strength and Tone: normal strength and tone  Abnormal Involuntary Movements: no abnormal movement noted  Gait and Station: normal gait    PSYCHIATRIC   Level of Consciousness: alert  Orientation: knows correct day of the week and date  Grooming: well groomed  Psychomotor Behavior: no restlessness/agitation  Speech: normal in rate,  rhythm and volume  Language: normal vocabulary  Mood: steady  Affect: appropriate  Thought Process: logical  Associations: intact associations  Thought Content: no SI/HI  Memory: grossly intact  Attention: intact to content of interview  Fund of Knowledge: appears adequate  Insight: good  Judgement: good    MEDICAL DECISION MAKING    DIAGNOSES  Cognitive d/o N.O.S., Depressive d/o unspecified, Psychosis N.O.S.    PROBLEM LIST AND MANAGEMENT PLANS    - depressive d/o, psychosis: continue above meds  - rtc 3 months     Time with patient: 20 min    LABORATORY DATA  No visits with results within 3 Month(s) from this visit.   Latest known visit with results is:   Lab Visit on 07/25/2023   Component Date Value Ref Range Status    Cholesterol 07/25/2023 163  120 - 199 mg/dL Final    Comment: The National Cholesterol Education Program (NCEP) has set the  following guidelines (reference ranges) for Cholesterol:  Optimal.....................<200 mg/dL  Borderline High.............200-239 mg/dL  High........................> or = 240 mg/dL      Triglycerides 07/25/2023 61  30 - 150 mg/dL Final    Comment: The National Cholesterol Education Program (NCEP) has set the  following guidelines (reference values) for triglycerides:  Normal......................<150 mg/dL  Borderline High.............150-199 mg/dL  High........................200-499 mg/dL      HDL 07/25/2023 77 (H)  40 - 75 mg/dL Final    Comment: The National Cholesterol Education Program (NCEP) has set the  following guidelines (reference values) for HDL Cholesterol:  Low...............<40 mg/dL  Optimal...........>60 mg/dL      LDL Cholesterol 07/25/2023 73.8  63.0 - 159.0 mg/dL Final    Comment: The National Cholesterol Education Program (NCEP) has set the  following guidelines (reference values) for LDL Cholesterol:  Optimal.......................<130 mg/dL  Borderline High...............130-159 mg/dL  High..........................160-189 mg/dL  Very  High.....................>190 mg/dL      HDL/Cholesterol Ratio 07/25/2023 47.2  20.0 - 50.0 % Final    Total Cholesterol/HDL Ratio 07/25/2023 2.1  2.0 - 5.0 Final    Non-HDL Cholesterol 07/25/2023 86  mg/dL Final    Comment: Risk category and Non-HDL cholesterol goals:  Coronary heart disease (CHD)or equivalent (10-year risk of CHD >20%):  Non-HDL cholesterol goal     <130 mg/dL  Two or more CHD risk factors and 10-year risk of CHD <= 20%:  Non-HDL cholesterol goal     <160 mg/dL  0 to 1 CHD risk factor:  Non-HDL cholesterol goal     <190 mg/dL      Sodium 07/25/2023 141  136 - 145 mmol/L Final    Potassium 07/25/2023 4.0  3.5 - 5.1 mmol/L Final    Chloride 07/25/2023 105  95 - 110 mmol/L Final    CO2 07/25/2023 25  23 - 29 mmol/L Final    Glucose 07/25/2023 81  70 - 110 mg/dL Final    BUN 07/25/2023 9  8 - 23 mg/dL Final    Creatinine 07/25/2023 1.1  0.5 - 1.4 mg/dL Final    Calcium 07/25/2023 9.0  8.7 - 10.5 mg/dL Final    Total Protein 07/25/2023 7.2  6.0 - 8.4 g/dL Final    Albumin 07/25/2023 3.7  3.5 - 5.2 g/dL Final    Total Bilirubin 07/25/2023 0.3  0.1 - 1.0 mg/dL Final    Comment: For infants and newborns, interpretation of results should be based  on gestational age, weight and in agreement with clinical  observations.    Premature Infant recommended reference ranges:  Up to 24 hours.............<8.0 mg/dL  Up to 48 hours............<12.0 mg/dL  3-5 days..................<15.0 mg/dL  6-29 days.................<15.0 mg/dL      Alkaline Phosphatase 07/25/2023 94  55 - 135 U/L Final    AST 07/25/2023 27  10 - 40 U/L Final    ALT 07/25/2023 21  10 - 44 U/L Final    eGFR 07/25/2023 >60.0  >60 mL/min/1.73 m^2 Final    Anion Gap 07/25/2023 11  8 - 16 mmol/L Final    PSA, Screen 07/25/2023 1.2  0.00 - 4.00 ng/mL Final    Comment: The testing method is a chemiluminescent microparticle immunoassay   manufactured by [employer] and performed on the    or   [employer] system. Values obtained with  different assay manufacturers   for   methods may be different and cannot be used interchangeably.  PSA Expected levels:  Hormonal Therapy: <0.05 ng/ml  Prostatectomy: <0.01 ng/ml  Radiation Therapy: <1.00 ng/ml      WBC 07/25/2023 5.15  3.90 - 12.70 K/uL Final    RBC 07/25/2023 4.35 (L)  4.60 - 6.20 M/uL Final    Hemoglobin 07/25/2023 12.7 (L)  14.0 - 18.0 g/dL Final    Hematocrit 07/25/2023 40.7  40.0 - 54.0 % Final    MCV 07/25/2023 94  82 - 98 fL Final    MCH 07/25/2023 29.2  27.0 - 31.0 pg Final    MCHC 07/25/2023 31.2 (L)  32.0 - 36.0 g/dL Final    RDW 07/25/2023 14.3  11.5 - 14.5 % Final    Platelets 07/25/2023 182  150 - 450 K/uL Final    MPV 07/25/2023 10.8  9.2 - 12.9 fL Final    Immature Granulocytes 07/25/2023 0.2  0.0 - 0.5 % Final    Gran # (ANC) 07/25/2023 3.3  1.8 - 7.7 K/uL Final    Immature Grans (Abs) 07/25/2023 0.01  0.00 - 0.04 K/uL Final    Comment: Mild elevation in immature granulocytes is non specific and   can be seen in a variety of conditions including stress response,   acute inflammation, trauma and pregnancy. Correlation with other   laboratory and clinical findings is essential.      Lymph # 07/25/2023 1.2  1.0 - 4.8 K/uL Final    Mono # 07/25/2023 0.5  0.3 - 1.0 K/uL Final    Eos # 07/25/2023 0.1  0.0 - 0.5 K/uL Final    Baso # 07/25/2023 0.02  0.00 - 0.20 K/uL Final    nRBC 07/25/2023 0  0 /100 WBC Final    Gran % 07/25/2023 64.2  38.0 - 73.0 % Final    Lymph % 07/25/2023 23.1  18.0 - 48.0 % Final    Mono % 07/25/2023 10.5  4.0 - 15.0 % Final    Eosinophil % 07/25/2023 1.6  0.0 - 8.0 % Final    Basophil % 07/25/2023 0.4  0.0 - 1.9 % Final    Differential Method 07/25/2023 Automated   Final    Hemoglobin A1C 07/25/2023 5.4  4.0 - 5.6 % Final    Comment: ADA Screening Guidelines:  5.7-6.4%  Consistent with prediabetes  >or=6.5%  Consistent with diabetes    High levels of fetal hemoglobin interfere with the HbA1C  assay. Heterozygous hemoglobin variants (HbS, HgC, etc)do  not  significantly interfere with this assay.   However, presence of multiple variants may affect accuracy.      Estimated Avg Glucose 07/25/2023 108  68 - 131 mg/dL Final           Vinnie Cotter

## 2024-03-18 ENCOUNTER — TELEPHONE (OUTPATIENT)
Dept: ENDOSCOPY | Facility: HOSPITAL | Age: 64
End: 2024-03-18
Payer: MEDICARE

## 2024-03-18 NOTE — TELEPHONE ENCOUNTER
Contacted Pt for endoscopy pre-call for upcoming Colonoscopy procedure on 3/23/24. Pre-call complete, Pt does not have any further questions.

## 2024-03-22 ENCOUNTER — ANESTHESIA EVENT (OUTPATIENT)
Dept: ENDOSCOPY | Facility: HOSPITAL | Age: 64
End: 2024-03-22
Payer: MEDICARE

## 2024-03-22 ENCOUNTER — TELEPHONE (OUTPATIENT)
Dept: ENDOSCOPY | Facility: HOSPITAL | Age: 64
End: 2024-03-22
Payer: MEDICARE

## 2024-03-22 NOTE — TELEPHONE ENCOUNTER
Spoke to patient to schedule procedure(s) Colonoscopy       Physician to perform procedure(s) Dr. HUBER Odom  Date of Procedure (s) 6/25/24  Arrival Time 8:15 AM  Time of Procedure(s) 9:15 AM   Location of Procedure(s) [facility] 4th Floor  Type of Rx Prep sent to patient: PEG  Instructions provided to patient via Email    Patient was informed on the following information and verbalized understanding. Screening questionnaire reviewed with patient and complete. If procedure requires anesthesia, a responsible adult needs to be present to accompany the patient home, patient cannot drive after receiving anesthesia. Appointment details are tentative, especially check-in time. Patient will receive a prep-op call 7 days prior to confirm check-in time for procedure. If applicable the patient should contact their pharmacy to verify Rx for procedure prep is ready for pick-up. Patient was advised to call the scheduling department at [phone] if pharmacy states no Rx is available. Patient was advised to call the endoscopy scheduling department if any questions or concerns arise.      [facility] Scheduling Department

## 2024-03-23 ENCOUNTER — ANESTHESIA (OUTPATIENT)
Dept: ENDOSCOPY | Facility: HOSPITAL | Age: 64
End: 2024-03-23
Payer: MEDICARE

## 2024-05-13 RX ORDER — HYDROXYZINE PAMOATE 25 MG/1
CAPSULE ORAL
Qty: 60 CAPSULE | Refills: 0 | Status: SHIPPED | OUTPATIENT
Start: 2024-05-13

## 2024-06-19 ENCOUNTER — TELEPHONE (OUTPATIENT)
Dept: ENDOSCOPY | Facility: HOSPITAL | Age: 64
End: 2024-06-19
Payer: MEDICARE

## 2024-06-19 NOTE — TELEPHONE ENCOUNTER
Please call the Endoscopy Scheduling Department to confirm your upcoming colonoscopy on 6/25/24 and to review your prep instructions [phone].  Thank you.

## 2024-06-24 ENCOUNTER — TELEPHONE (OUTPATIENT)
Dept: ENDOSCOPY | Facility: HOSPITAL | Age: 64
End: 2024-06-24
Payer: MEDICARE

## 2024-06-24 NOTE — TELEPHONE ENCOUNTER
Contacted Pt for endoscopy pre-call for upcoming procedure.  Pre-call complete, Pt does not have any further questions. Arrival time:11:30 am

## 2024-06-25 ENCOUNTER — HOSPITAL ENCOUNTER (OUTPATIENT)
Facility: HOSPITAL | Age: 64
Discharge: HOME | End: 2024-06-25
Attending: INTERNAL MEDICINE | Admitting: INTERNAL MEDICINE
Payer: MEDICARE

## 2024-06-25 VITALS
TEMPERATURE: 98 F | RESPIRATION RATE: 18 BRPM | SYSTOLIC BLOOD PRESSURE: 142 MMHG | OXYGEN SATURATION: 99 % | BODY MASS INDEX: 26.66 KG/M2 | DIASTOLIC BLOOD PRESSURE: 89 MMHG | WEIGHT: 180 LBS | HEART RATE: 75 BPM | HEIGHT: 69 IN

## 2024-06-25 DIAGNOSIS — Z12.11 SCREEN FOR COLON CANCER: ICD-10-CM

## 2024-06-25 PROCEDURE — 63600175 PHARM REV CODE 636 W HCPCS: Mod: HCNC | Performed by: NURSE ANESTHETIST, CERTIFIED REGISTERED

## 2024-06-25 PROCEDURE — 45385 COLONOSCOPY W/LESION REMOVAL: CPT | Mod: PT,HCNC | Performed by: INTERNAL MEDICINE

## 2024-06-25 PROCEDURE — 27201089 HC SNARE, DISP (ANY): Mod: HCNC | Performed by: INTERNAL MEDICINE

## 2024-06-25 PROCEDURE — 45385 COLONOSCOPY W/LESION REMOVAL: CPT | Mod: 33,HCNC,, | Performed by: INTERNAL MEDICINE

## 2024-06-25 PROCEDURE — 25000003 PHARM REV CODE 250: Mod: HCNC | Performed by: NURSE ANESTHETIST, CERTIFIED REGISTERED

## 2024-06-25 PROCEDURE — 88305 TISSUE EXAM BY PATHOLOGIST: CPT | Mod: 26,HCNC,, | Performed by: PATHOLOGY

## 2024-06-25 PROCEDURE — 27200997: Mod: HCNC | Performed by: INTERNAL MEDICINE

## 2024-06-25 PROCEDURE — 37000009 HC ANESTHESIA EA ADD 15 MINS: Mod: HCNC | Performed by: INTERNAL MEDICINE

## 2024-06-25 PROCEDURE — 88305 TISSUE EXAM BY PATHOLOGIST: CPT | Mod: HCNC | Performed by: PATHOLOGY

## 2024-06-25 PROCEDURE — 37000008 HC ANESTHESIA 1ST 15 MINUTES: Mod: HCNC | Performed by: INTERNAL MEDICINE

## 2024-06-25 RX ORDER — LIDOCAINE HYDROCHLORIDE 20 MG/ML
INJECTION INTRAVENOUS
Status: DISCONTINUED | OUTPATIENT
Start: 2024-06-25 | End: 2024-06-25

## 2024-06-25 RX ORDER — SODIUM CHLORIDE 9 MG/ML
INJECTION, SOLUTION INTRAVENOUS CONTINUOUS
Status: DISCONTINUED | OUTPATIENT
Start: 2024-06-25 | End: 2024-06-25 | Stop reason: HOSPADM

## 2024-06-25 RX ORDER — SODIUM CHLORIDE, SODIUM LACTATE, POTASSIUM CHLORIDE, CALCIUM CHLORIDE 600; 310; 30; 20 MG/100ML; MG/100ML; MG/100ML; MG/100ML
INJECTION, SOLUTION INTRAVENOUS CONTINUOUS PRN
Status: DISCONTINUED | OUTPATIENT
Start: 2024-06-25 | End: 2024-06-25

## 2024-06-25 RX ORDER — PROPOFOL 10 MG/ML
INJECTION, EMULSION INTRAVENOUS
Status: DISCONTINUED | OUTPATIENT
Start: 2024-06-25 | End: 2024-06-25

## 2024-06-25 RX ADMIN — LIDOCAINE HYDROCHLORIDE 50 MG: 20 INJECTION INTRAVENOUS at 12:06

## 2024-06-25 RX ADMIN — SODIUM CHLORIDE, SODIUM LACTATE, POTASSIUM CHLORIDE, AND CALCIUM CHLORIDE: 600; 310; 30; 20 INJECTION, SOLUTION INTRAVENOUS at 12:06

## 2024-06-25 RX ADMIN — PROPOFOL 150 MCG/KG/MIN: 10 INJECTION, EMULSION INTRAVENOUS at 12:06

## 2024-06-25 NOTE — TRANSFER OF CARE
"Anesthesia Transfer of Care Note    Patient: Roni Espinoza    Procedure(s) Performed: Procedure(s) (LRB):  COLONOSCOPY (N/A)    Patient location: GI    Anesthesia Type: general    Transport from OR: Transported from OR on room air with adequate spontaneous ventilation    Post pain: adequate analgesia    Post assessment: no apparent anesthetic complications and tolerated procedure well    Post vital signs: stable    Level of consciousness: awake, alert and oriented    Nausea/Vomiting: no nausea/vomiting    Complications: none    Transfer of care protocol was followed      Last vitals: Visit Vitals  /72 (BP Location: Left arm, Patient Position: Lying)   Pulse 92   Temp 36.5 °C (97.7 °F) (Temporal)   Resp 18   Ht 5' 9" (1.753 m)   Wt 81.6 kg (180 lb)   SpO2 99%   BMI 26.58 kg/m²     "

## 2024-06-25 NOTE — ANESTHESIA PREPROCEDURE EVALUATION
06/25/2024  Roni Espinoza is a 63 y.o., male.      Pre-op Assessment    I have reviewed the Patient Summary Reports.     I have reviewed the Nursing Notes. I have reviewed the NPO Status.   I have reviewed the Medications.     Review of Systems  Anesthesia Hx:  No problems with previous Anesthesia             Denies Family Hx of Anesthesia complications.    Denies Personal Hx of Anesthesia complications.                    Hematology/Oncology:  Hematology Normal   Oncology Normal                                   EENT/Dental:  EENT/Dental Normal           Cardiovascular:  Exercise tolerance: good   Hypertension, well controlled                Functional Capacity good / => 4 METS        PMH AVM                Hypertension, Essential Hypertension , Pt in normal range, systolic < 130 and diastolic < 85        Pulmonary:  Pulmonary Normal                       Renal/:  Renal/ Normal                 Hepatic/GI:  Hepatic/GI Normal          Bowel Conditions:  Irritable Bowel Syndrome        Musculoskeletal:  Musculoskeletal Normal              Lumbar Spine Disorders (Sciatic discomfort) Sciatic discomfort  Neurological:      Headaches Seizures, well controlled     Dx of Headaches, Cluster Headache      Seizure Disorder , Most recent seizure occurred >1 year ago   , anticonvulsant levels are none recent.                      Endocrine:  Endocrine Normal            Dermatological:  Skin Normal    Psych:  Psychiatric History  depression                Physical Exam  General: Well nourished, Cooperative, Alert and Oriented    Airway:  Mallampati: II / II  Mouth Opening: Normal  TM Distance: Normal  Tongue: Normal  Neck ROM: Normal ROM    Dental:  Intact    Chest/Lungs:  Clear to auscultation, Normal Respiratory Rate    Heart:  Rate: Normal  Rhythm: Regular Rhythm  Sounds: Normal    Abdomen:  Normal,  Soft        Anesthesia Plan  Type of Anesthesia, risks & benefits discussed:    Anesthesia Type: Gen ETT, Gen Natural Airway  Intra-op Monitoring Plan: Standard ASA Monitors  Post Op Pain Control Plan: multimodal analgesia  Induction:  IV  Airway Plan: Direct  Informed Consent: Informed consent signed with the Patient and all parties understand the risks and agree with anesthesia plan.  All questions answered. Patient consented to blood products? No  ASA Score: 2  Day of Surgery Review of History & Physical: I have interviewed and examined the patient. I have reviewed the patient's H&P dated: tb. There are no significant changes.     Ready For Surgery From Anesthesia Perspective.     .

## 2024-06-25 NOTE — ANESTHESIA POSTPROCEDURE EVALUATION
Anesthesia Post Evaluation    Patient: Roni Espinoza    Procedure(s) Performed: Procedure(s) (LRB):  COLONOSCOPY (N/A)    Final Anesthesia Type: general      Patient location during evaluation: PACU  Patient participation: Yes- Able to Participate  Level of consciousness: awake and alert  Post-procedure vital signs: reviewed and stable  Pain management: adequate  Airway patency: patent    PONV status at discharge: No PONV  Anesthetic complications: no      Cardiovascular status: blood pressure returned to baseline  Respiratory status: unassisted  Follow-up not needed.              Vitals Value Taken Time   /80 06/25/24 1348   Temp 36.5 °C (97.7 °F) 06/25/24 1336   Pulse 73 06/25/24 1348   Resp 18 06/25/24 1348   SpO2 96 % 06/25/24 1348         No case tracking events are documented in the log.      Pain/Everett Score: Everett Score: 10 (6/25/2024  1:37 PM)

## 2024-06-25 NOTE — H&P
[facility]  History & Physical    Subjective:      Chief Complaint/Reason for Admission:     Screening colonoscopy Pt's bother at 61 with some unknown abdominal cancer, never got a diagnosis. Nobody with colon cancer. Nobody with advanced colon adenomatous   polyps before the age of 60. No FAP, no attenuated FAP, no MAP and no Duran syndrome.     Roni Espinoza is a 63 y.o. male.    Past Medical History:   Diagnosis Date    Allergy     Anxiety     Depression     Headache(784.0)     History of psychiatric hospitalization     Hx of psychiatric care     Hypertension     Psychiatric problem     Seizures     last seizure 2006    Therapy      Past Surgical History:   Procedure Laterality Date    BRAIN SURGERY  1990    AV malformation repair    COLONOSCOPY N/A 10/3/2016    Procedure: COLONOSCOPY;  Surgeon: Ehsan Odom MD;  Location: [facility] ([address] [place] [address]);  Service: Endoscopy;  Laterality: N/A;  ok for me or wes to do colonoscopy per patient would like asap     Social History     Tobacco Use    Smoking status: Never    Smokeless tobacco: Never   Substance Use Topics    Alcohol use: No     Alcohol/week: 0.0 standard drinks of alcohol    Drug use: No       PTA Medications   Medication Sig    amLODIPine (NORVASC) 5 MG tablet Take 1 tablet (5 mg total) by mouth once daily.    cetirizine (ZYRTEC) 10 MG tablet Take 1 tablet (10 mg total) by mouth once daily.    folic acid-vit B6-vit B12 2.5-25-2 mg (FOLBIC) 2.5-25-2 mg Tab Take 1 tablet by mouth once daily.    lamoTRIgine (LAMICTAL) 200 MG tablet TAKE 1 TABLET ONE TIME DAILY    risperiDONE (RISPERDAL) 1 MG tablet Take 1 tablet (1 mg total) by mouth every evening.    rosuvastatin (CRESTOR) 20 MG tablet Take 1 tablet (20 mg total) by mouth once daily.    sertraline (ZOLOFT) 100 MG tablet Take 1 tablet (100 mg total) by mouth once daily.    hydrOXYzine pamoate (VISTARIL) 25 MG Cap TAKE 1 OR 2 CAPSULES AT BEDTIME AS NEEDED FOR SLEEP    meloxicam (MOBIC)  15 MG tablet Take 1 tablet (15 mg total) by mouth once daily.    pantoprazole (PROTONIX) 40 MG tablet take 1 tablet by mouth every morning BEFORE BREAKFAST    sars-cov-2, covid-19, (MODERNA COVID-19) 50 mcg/0.25 ml injection (BOOSTER) Inject into the muscle.    sars-cov-2, covid-19, (MODERNA COVID-19) 50 mcg/0.25 ml injection (BOOSTER) Inject into the muscle.     Review of patient's allergies indicates:  No Known Allergies     Review of Systems   Constitutional:  Negative for fever.       Objective:      Vital Signs (Most Recent)  Temp: 97.7 °F (36.5 °C) (06/25/24 1141)  Pulse: 82 (06/25/24 1141)  Resp: 17 (06/25/24 1141)  BP: (!) 149/93 (06/25/24 1141)  SpO2: 98 % (06/25/24 1141)    Vital Signs Range (Last 24H):  Temp:  [97.7 °F (36.5 °C)]   Pulse:  [82]   Resp:  [17]   BP: (149)/(93)   SpO2:  [98 %]     Physical Exam  Cardiovascular:      Rate and Rhythm: Normal rate.   Pulmonary:      Effort: Pulmonary effort is normal.   Neurological:      Mental Status: He is alert and oriented to person, place, and time.           Assessment:        Screening colonoscopy Pt's bother at 61 with some unknown abdominal cancer, never got a diagnosis. Nobody with colon cancer. Nobody with advanced colon adenomatous   polyps before the age of 60. No FAP, no attenuated FAP, no MAP and no Duran syndrome.       Plan:      Screening colonoscopy Pt's bother at 61 with some unknown abdominal cancer, never got a diagnosis. Nobody with colon cancer. Nobody with advanced colon adenomatous   polyps before the age of 60. No FAP, no attenuated FAP, no MAP and no Duran syndrome.

## 2024-06-25 NOTE — [employer] PATIENT INSTRUCTIONS
Discharge Summary/Instructions after an Endoscopic Procedure  Patient Name: Roni Espinoza  Patient MRN: [id number]  Patient YOB: 1960 Tuesday, June 25, 2024  Ehsan Odom MD  Dear patient,  As a result of recent federal legislation (The Federal Cures Act), you may   receive lab or pathology results from your procedure in your [facility]   account before your physician is able to contact you. Your physician or   their representative will relay the results to you with their   recommendations at their soonest availability.  Thank you,  RESTRICTIONS:  During your procedure today, you received medications for sedation.  These   medications may affect your judgment, balance and coordination.  Therefore,   for 24 hours, you have the following restrictions:   - DO NOT drive a car, operate machinery, make legal/financial decisions,   sign important papers or drink alcohol.    ACTIVITY:  Today: no heavy lifting, straining or running due to procedural   sedation/anesthesia.  The following day: return to full activity including work.  DIET:  Eat and drink normally unless instructed otherwise.     TREATMENT FOR COMMON SIDE EFFECTS:  - Mild abdominal pain, nausea, belching, bloating or excessive gas:  rest,   eat lightly and use a heating pad.  - Sore Throat: treat with throat lozenges and/or gargle with warm salt   water.  - Because air was used during the procedure, expelling large amounts of air   from your rectum or belching is normal.  - If a bowel prep was taken, you may not have a bowel movement for 1-3 days.    This is normal.  SYMPTOMS TO WATCH FOR AND REPORT TO YOUR PHYSICIAN:  1. Abdominal pain or bloating, other than gas cramps.  2. Chest pain.  3. Back pain.  4. Signs of infection such as: chills or fever occurring within 24 hours   after the procedure.  5. Rectal bleeding, which would show as bright red, maroon, or black stools.   (A tablespoon of blood from the rectum is not serious, especially if    hemorrhoids are present.)  6. Vomiting.  7. Weakness or dizziness.  GO DIRECTLY TO THE NEAREST EMERGENCY ROOM IF YOU HAVE ANY OF THE FOLLOWING:      Difficulty breathing              Chills and/or fever over 101 F   Persistent vomiting and/or vomiting blood   Severe abdominal pain   Severe chest pain   Black, tarry stools   Bleeding- more than one tablespoon   Any other symptom or condition that you feel may need urgent attention  Your doctor recommends these additional instructions:  If any biopsies were taken, your doctors clinic will contact you in 1 to 2   weeks with any results.  - Discharge patient to home.   - Await pathology results.   - Telephone endoscopist for pathology results in 3 weeks.   - Repeat colonoscopy in 5 years for surveillance based on pathology results.     - Return to primary care physician.   - The findings and recommendations were discussed with the patient.  For questions, problems or results please call your physician - Ehsan Odom MD at Work:  [phone].  [facility], EMERGENCY ROOM PHONE NUMBER: [phone]  IF A COMPLICATION OR EMERGENCY SITUATION ARISES AND YOU ARE UNABLE TO REACH   YOUR PHYSICIAN - GO DIRECTLY TO THE EMERGENCY ROOM.  Ehsan Odom MD  6/25/2024 1:34:46 PM  This report has been verified and signed electronically.  Dear patient,  As a result of recent federal legislation (The Federal Cures Act), you may   receive lab or pathology results from your procedure in your [facility]   account before your physician is able to contact you. Your physician or   their representative will relay the results to you with their   recommendations at their soonest availability.  Thank you,  [employer]ATION

## 2024-06-27 ENCOUNTER — PATIENT MESSAGE (OUTPATIENT)
Dept: GASTROENTEROLOGY | Facility: CLINIC | Age: 64
End: 2024-06-27
Payer: MEDICARE

## 2024-06-27 LAB
FINAL PATHOLOGIC DIAGNOSIS: NORMAL
GROSS: NORMAL
Lab: NORMAL

## 2024-06-27 NOTE — PROGRESS NOTES
Norma please tell Roni that his colonoscopy pathology was benign but it was an adenoma recommend his next surveillance colonoscopy in 5 years.  Thank you      Ascending colon polyp:  Tubular adenoma   Comment: Interp By Arlette Maldonado M.D., Signed on 06/27/2024

## 2024-08-20 ENCOUNTER — OFFICE VISIT (OUTPATIENT)
Dept: PSYCHIATRY | Facility: CLINIC | Age: 64
End: 2024-08-20
Payer: MEDICARE

## 2024-08-20 VITALS
WEIGHT: 193.81 LBS | SYSTOLIC BLOOD PRESSURE: 147 MMHG | BODY MASS INDEX: 28.62 KG/M2 | HEART RATE: 77 BPM | DIASTOLIC BLOOD PRESSURE: 94 MMHG

## 2024-08-20 DIAGNOSIS — F39 MOOD DISORDER: ICD-10-CM

## 2024-08-20 PROCEDURE — 3008F BODY MASS INDEX DOCD: CPT | Mod: HCNC,CPTII,S$GLB, | Performed by: PSYCHIATRY & NEUROLOGY

## 2024-08-20 PROCEDURE — 99999 PR PBB SHADOW E&M-EST. PATIENT-LVL I: CPT | Mod: PBBFAC,HCNC,, | Performed by: PSYCHIATRY & NEUROLOGY

## 2024-08-20 PROCEDURE — 3080F DIAST BP >= 90 MM HG: CPT | Mod: HCNC,CPTII,S$GLB, | Performed by: PSYCHIATRY & NEUROLOGY

## 2024-08-20 PROCEDURE — 3077F SYST BP >= 140 MM HG: CPT | Mod: HCNC,CPTII,S$GLB, | Performed by: PSYCHIATRY & NEUROLOGY

## 2024-08-20 PROCEDURE — 99214 OFFICE O/P EST MOD 30 MIN: CPT | Mod: HCNC,S$GLB,, | Performed by: PSYCHIATRY & NEUROLOGY

## 2024-08-20 RX ORDER — SERTRALINE HYDROCHLORIDE 100 MG/1
100 TABLET, FILM COATED ORAL DAILY
Qty: 90 TABLET | Refills: 1 | Status: SHIPPED | OUTPATIENT
Start: 2024-08-20

## 2024-08-20 RX ORDER — RISPERIDONE 1 MG/1
1 TABLET ORAL NIGHTLY
Qty: 90 TABLET | Refills: 1 | Status: SHIPPED | OUTPATIENT
Start: 2024-08-20

## 2024-08-20 RX ORDER — LAMOTRIGINE 200 MG/1
TABLET ORAL
Qty: 90 TABLET | Refills: 1 | Status: SHIPPED | OUTPATIENT
Start: 2024-08-20

## 2024-08-20 RX ORDER — HYDROXYZINE PAMOATE 25 MG/1
CAPSULE ORAL
Qty: 60 CAPSULE | Refills: 0 | Status: SHIPPED | OUTPATIENT
Start: 2024-08-20

## 2024-08-20 RX ORDER — FOLIC ACID-PYRIDOXINE-CYANOCOBALAMIN TAB 2.5-25-2 MG 2.5-25-2 MG
1 TAB ORAL DAILY
Qty: 90 TABLET | Refills: 1 | Status: SHIPPED | OUTPATIENT
Start: 2024-08-20

## 2024-08-20 NOTE — PROGRESS NOTES
ESTABLISHED OUTPATIENT VISIT   E/M LEVEL 4: [id number]    ENCOUNTER DATE: 8/20/2024  SITE: [facility], [address]    HISTORY    CHIEF COMPLAINT   Roni Espinoza is a 63 y.o. male who presents for follow up of Cognitive d/o N.O.S., Mood d/o N.O.S., Psychosis N.O.S.  .    HPI     Denies recent seizure.    Paranoid feelings at times.    He again appears psychiatrically stable during today's visit. Shows a sense of humor.    Continues to have some conflict with daughter.    On exam today no EPS noted.    Psychotropic meds were discussed.    Psychiatric Review Of Systems - Is patient experiencing or having changes in:  sleep: good, takes Hydroxyzine prn  appetite: no  Weight: no  energy/anergy: no  interest/pleasure/anhedonia: no  somatic symptoms: no  libido: no  anxiety/panic: no  guilty/hopelessness: no  concentration: no  S.I.B.s/risky behavior: no  Irritability: no  Racing thoughts: no  Impulsive behaviors: no  Paranoia:no  AVH:no    Recent alcohol: occasional small amount  Recent drug: no    Medical ROS   Denies any current physical complaint.     PAST MEDICAL, FAMILY AND SOCIAL HISTORY: The patient's past medical, family and social history have been reviewed and updated as appropriate within the electronic medical record - see encounter notes.    PSYCHOTROPIC MEDICATIONS   Lamictal 200 mg qam, Zoloft 100 mg qam, Risperdal 1 mg at bedtime, Hydroxyzine 25- 50 mg at bedtime prn[takes occasionally], Folbic    EXAMINATION    Vitals:    08/20/24 1201   BP: (!) 147/94   Pulse: 77     BP (!) 147/94   Pulse 77   Wt 87.9 kg (193 lb 12.6 oz)   BMI 28.62 kg/m²     CONSTITUTIONAL  General Appearance: well nourished    MUSCULOSKELETAL  Muscle Strength and Tone: normal strength and tone  Abnormal Involuntary Movements: no abnormal movement noted  Gait and Station: normal gait    PSYCHIATRIC   Level of Consciousness: alert  Orientation: knows correct day of the week and date  Grooming: well groomed  Psychomotor  Behavior: no restlessness/agitation  Speech: normal in rate, rhythm and volume  Language: normal vocabulary  Mood: steady  Affect: appropriate  Thought Process: logical  Associations: intact associations  Thought Content: no SI/HI  Memory: grossly intact  Attention: intact to content of interview  Fund of Knowledge: appears adequate  Insight: good  Judgement: good    MEDICAL DECISION MAKING    DIAGNOSES  Cognitive d/o N.O.S., Depressive d/o unspecified, Psychosis N.O.S.    PROBLEM LIST AND MANAGEMENT PLANS    - depressive d/o, psychosis: continue above meds  - rtc 3 months     Time with patient: 20 min    LABORATORY DATA  Admission on 06/25/2024, Discharged on 06/25/2024   Component Date Value Ref Range Status    Final Pathologic Diagnosis 06/25/2024    Final                    Value:Ascending colon polyp:   Tubular adenoma      Interp By Arlette Maldonado M.D., Signed on 06/27/2024 at 12:47    Gross 06/25/2024    Final                    Value:Patient ID/MRN:  [id number]   Pathology label MRN:  [id number]    The specimen is received in formalin labeled with the patient's name, medical record number and designated as &quot;ascending colon polyp&quot;. The specimen consists of multiple tan-white to tan-yellow fragments of soft tissue measuring 0.9 x 0.6 x 0.2   cm in aggregate. The specimen is submitted entirely in cassette [id number].    Ericka Angeles  Grossing Technologist      Disclaimer 06/25/2024 Unless the case is a 'gross only' or additional testing only, the final diagnosis for each specimen is based on a microscopic examination of appropriate tissue sections.   Final           Vinnie Cotter                           99

## 2024-09-05 ENCOUNTER — PATIENT MESSAGE (OUTPATIENT)
Dept: PSYCHIATRY | Facility: CLINIC | Age: 64
End: 2024-09-05
Payer: MEDICARE

## 2024-12-03 ENCOUNTER — OFFICE VISIT (OUTPATIENT)
Dept: PSYCHIATRY | Facility: CLINIC | Age: 64
End: 2024-12-03
Payer: MEDICARE

## 2024-12-03 VITALS
BODY MASS INDEX: 29.28 KG/M2 | DIASTOLIC BLOOD PRESSURE: 87 MMHG | HEART RATE: 80 BPM | SYSTOLIC BLOOD PRESSURE: 154 MMHG | WEIGHT: 198.31 LBS

## 2024-12-03 DIAGNOSIS — F39 MOOD DISORDER: ICD-10-CM

## 2024-12-03 PROCEDURE — 99214 OFFICE O/P EST MOD 30 MIN: CPT | Mod: HCNC,S$GLB,, | Performed by: PSYCHIATRY & NEUROLOGY

## 2024-12-03 RX ORDER — FOLIC ACID-PYRIDOXINE-CYANOCOBALAMIN TAB 2.5-25-2 MG 2.5-25-2 MG
1 TAB ORAL DAILY
Qty: 90 TABLET | Refills: 1 | Status: SHIPPED | OUTPATIENT
Start: 2024-12-03

## 2024-12-03 RX ORDER — RISPERIDONE 1 MG/1
1 TABLET ORAL NIGHTLY
Qty: 90 TABLET | Refills: 1 | Status: SHIPPED | OUTPATIENT
Start: 2024-12-03

## 2024-12-03 RX ORDER — SERTRALINE HYDROCHLORIDE 100 MG/1
100 TABLET, FILM COATED ORAL DAILY
Qty: 90 TABLET | Refills: 1 | Status: SHIPPED | OUTPATIENT
Start: 2024-12-03

## 2024-12-03 RX ORDER — HYDROXYZINE PAMOATE 25 MG/1
CAPSULE ORAL
Qty: 60 CAPSULE | Refills: 0 | Status: SHIPPED | OUTPATIENT
Start: 2024-12-03

## 2024-12-03 RX ORDER — LAMOTRIGINE 200 MG/1
TABLET ORAL
Qty: 90 TABLET | Refills: 1 | Status: SHIPPED | OUTPATIENT
Start: 2024-12-03

## 2024-12-03 NOTE — PROGRESS NOTES
ESTABLISHED OUTPATIENT VISIT   E/M LEVEL 4: [id number]    ENCOUNTER DATE: 12/3/2024  SITE: [facility], [address]    HISTORY    CHIEF COMPLAINT   Roni Espinoza is a 63 y.o. male who presents for follow up of Cognitive d/o N.O.S., Mood d/o N.O.S., Psychosis N.O.S.  .    HPI     Denies recent seizure.    Paranoid feelings at times.    He again appears psychiatrically stable during today's visit. Shows a sense of humor.    Continues to have some conflict with daughter.  Daughter 47. Son 43[his mother  about 2 years ago]    On exam today no EPS noted.    Psychotropic meds were discussed.    Psychiatric Review Of Systems - Is patient experiencing or having changes in:  sleep: good, takes Hydroxyzine prn  appetite: no  Weight: no  energy/anergy: no  interest/pleasure/anhedonia: no  somatic symptoms: no  libido: no  anxiety/panic: no  guilty/hopelessness: no  concentration: no  S.I.B.s/risky behavior: no  Irritability: no  Racing thoughts: no  Impulsive behaviors: no  Paranoia:no  AVH:no    Recent alcohol: occasional small amount  Recent drug: no    Medical ROS   Denies any current physical complaint.     PAST MEDICAL, FAMILY AND SOCIAL HISTORY: The patient's past medical, family and social history have been reviewed and updated as appropriate within the electronic medical record - see encounter notes.    PSYCHOTROPIC MEDICATIONS   Lamictal 200 mg qam, Zoloft 100 mg qam, Risperdal 1 mg at bedtime, Hydroxyzine 50 mg at bedtime prn[has been taking about 3 times per week], Folbic    EXAMINATION    There were no vitals filed for this visit.    There were no vitals taken for this visit.    CONSTITUTIONAL  General Appearance: well nourished    MUSCULOSKELETAL  Muscle Strength and Tone: normal strength and tone  Abnormal Involuntary Movements: no abnormal movement noted  Gait and Station: normal gait    PSYCHIATRIC   Level of Consciousness: alert  Orientation: knows correct day of the week and day of the  month  Grooming: well groomed  Psychomotor Behavior: no restlessness/agitation  Speech: normal in rate, rhythm and volume  Language: normal vocabulary  Mood: steady  Affect: appropriate  Thought Process: logical  Associations: intact associations  Thought Content: no SI/HI  Memory: grossly intact  Attention: intact to content of interview  Fund of Knowledge: appears adequate  Insight: good  Judgement: good    MEDICAL DECISION MAKING    DIAGNOSES  Cognitive d/o N.O.S., Depressive d/o unspecified, Psychosis N.O.S.    PROBLEM LIST AND MANAGEMENT PLANS    - depressive d/o, psychosis: continue above meds  - primary care appointment  - rtc 3 months     Time with patient: 20 min    LABORATORY DATA  No visits with results within 3 Month(s) from this visit.   Latest known visit with results is:   Admission on 06/25/2024, Discharged on 06/25/2024   Component Date Value Ref Range Status    Final Pathologic Diagnosis 06/25/2024    Final                    Value:Ascending colon polyp:   Tubular adenoma      Interp By Arlette Maldonado M.D., Signed on 06/27/2024 at 12:47    Gross 06/25/2024    Final                    Value:Patient ID/MRN:  [id number]   Pathology label MRN:  [id number]    The specimen is received in formalin labeled with the patient's name, medical record number and designated as &quot;ascending colon polyp&quot;. The specimen consists of multiple tan-white to tan-yellow fragments of soft tissue measuring 0.9 x 0.6 x 0.2   cm in aggregate. The specimen is submitted entirely in cassette [id number].    Ericka Angeles  Grossing Technologist      Disclaimer 06/25/2024 Unless the case is a 'gross only' or additional testing only, the final diagnosis for each specimen is based on a microscopic examination of appropriate tissue sections.   Final           Vinnie Cotter

## 2024-12-17 ENCOUNTER — PATIENT MESSAGE (OUTPATIENT)
Dept: INTERNAL MEDICINE | Facility: CLINIC | Age: 64
End: 2024-12-17
Payer: MEDICARE

## 2025-02-07 ENCOUNTER — PATIENT OUTREACH (OUTPATIENT)
Dept: ADMINISTRATIVE | Facility: HOSPITAL | Age: 65
End: 2025-02-07
Payer: MEDICARE

## 2025-02-14 ENCOUNTER — OFFICE VISIT (OUTPATIENT)
Dept: INTERNAL MEDICINE | Facility: CLINIC | Age: 65
End: 2025-02-14
Payer: MEDICARE

## 2025-02-14 VITALS
HEART RATE: 76 BPM | SYSTOLIC BLOOD PRESSURE: 156 MMHG | OXYGEN SATURATION: 97 % | WEIGHT: 204.38 LBS | DIASTOLIC BLOOD PRESSURE: 87 MMHG | HEIGHT: 69 IN | BODY MASS INDEX: 30.27 KG/M2

## 2025-02-14 DIAGNOSIS — R79.81 ABNORMAL BLOOD-GAS LEVEL: ICD-10-CM

## 2025-02-14 DIAGNOSIS — M48.061 BILATERAL STENOSIS OF LATERAL RECESS OF LUMBAR SPINE: ICD-10-CM

## 2025-02-14 DIAGNOSIS — Z00.01 ENCOUNTER FOR ROUTINE ADULT HEALTH EXAMINATION WITH ABNORMAL FINDINGS: ICD-10-CM

## 2025-02-14 DIAGNOSIS — N18.2 STAGE 2 CHRONIC KIDNEY DISEASE: ICD-10-CM

## 2025-02-14 DIAGNOSIS — E78.2 HYPERLIPIDEMIA, MIXED: ICD-10-CM

## 2025-02-14 DIAGNOSIS — M54.16 CHRONIC RADICULAR PAIN OF LOWER BACK: ICD-10-CM

## 2025-02-14 DIAGNOSIS — Z00.00 ENCOUNTER FOR ROUTINE ADULT HEALTH EXAMINATION WITHOUT ABNORMAL FINDINGS: ICD-10-CM

## 2025-02-14 DIAGNOSIS — E78.2 MIXED HYPERLIPIDEMIA: ICD-10-CM

## 2025-02-14 DIAGNOSIS — I10 ESSENTIAL HYPERTENSION: Primary | ICD-10-CM

## 2025-02-14 DIAGNOSIS — G89.29 CHRONIC RADICULAR PAIN OF LOWER BACK: ICD-10-CM

## 2025-02-14 DIAGNOSIS — F33.3 SEVERE EPISODE OF RECURRENT MAJOR DEPRESSIVE DISORDER, WITH PSYCHOTIC FEATURES: ICD-10-CM

## 2025-02-14 DIAGNOSIS — Q27.30 AVM (ARTERIOVENOUS MALFORMATION): ICD-10-CM

## 2025-02-14 DIAGNOSIS — I10 UNCONTROLLED STAGE 2 HYPERTENSION: ICD-10-CM

## 2025-02-14 DIAGNOSIS — G40.309 GENERALIZED CONVULSIVE EPILEPSY WITHOUT INTRACTABLE EPILEPSY: ICD-10-CM

## 2025-02-14 DIAGNOSIS — E66.811 OBESITY (BMI 30.0-34.9): ICD-10-CM

## 2025-02-14 DIAGNOSIS — H25.9 AGE-RELATED CATARACT OF BOTH EYES, UNSPECIFIED AGE-RELATED CATARACT TYPE: ICD-10-CM

## 2025-02-14 DIAGNOSIS — M25.561 CHRONIC PAIN OF RIGHT KNEE: ICD-10-CM

## 2025-02-14 DIAGNOSIS — R79.9 ABNORMAL FINDING OF BLOOD CHEMISTRY, UNSPECIFIED: ICD-10-CM

## 2025-02-14 DIAGNOSIS — D51.3 OTHER DIETARY VITAMIN B12 DEFICIENCY ANEMIA: ICD-10-CM

## 2025-02-14 DIAGNOSIS — R78.71 ABNORMAL LEAD LEVEL IN BLOOD: ICD-10-CM

## 2025-02-14 DIAGNOSIS — G89.29 CHRONIC PAIN OF RIGHT KNEE: ICD-10-CM

## 2025-02-14 DIAGNOSIS — D64.9 NORMOCYTIC ANEMIA: ICD-10-CM

## 2025-02-14 DIAGNOSIS — D51.8 OTHER VITAMIN B12 DEFICIENCY ANEMIAS: ICD-10-CM

## 2025-02-14 PROCEDURE — G2211 COMPLEX E/M VISIT ADD ON: HCPCS | Mod: HCNC,S$GLB,, | Performed by: INTERNAL MEDICINE

## 2025-02-14 PROCEDURE — 99215 OFFICE O/P EST HI 40 MIN: CPT | Mod: HCNC,S$GLB,, | Performed by: INTERNAL MEDICINE

## 2025-02-14 PROCEDURE — 99999 PR PBB SHADOW E&M-EST. PATIENT-LVL IV: CPT | Mod: PBBFAC,HCNC,, | Performed by: INTERNAL MEDICINE

## 2025-02-14 NOTE — PROGRESS NOTES
Subjective:      Patient ID: Roni Espinoza is a 64 y.o. male.    Chief Complaint: Establish Care, Annual Exam, Edema, and Flank Pain      History of Present Illness    CHIEF COMPLAINT:    The patient is a 64-year-old Black male, accompanied by his wife, presenting today to establish care with a new primary care provider. He is seeking an annual wellness checkup and management of multiple chronic medical conditions, including uncontrolled hypertension, hyperlipidemia, lumbar spine stenosis, chronic back pain, severe bilateral knee osteoarthritis (status post left knee replacement), major depression, anxiety, insomnia, allergies, arteriovenous malformation (AVM) with a history of brain surgery, constipation, generalized seizures, bilateral cataracts, and obesity.  He reports ongoing back pain and right knee pain with associated swelling, which has gradually worsened over the past several weeks. The pain severity is rated at 7-8/10. He denies any recent falls or injuries.  Medical History & Current Complaints:  Back Pain: Chronic back pain, with recent worsening of severity.  Right Knee Pain & Swelling: Symptoms have persisted for years, with recent exacerbation over the past several weeks.  Hypertension: Chronic hypertension that is currently poorly controlled, with a blood pressure of 156/87 today. He is asymptomatic, denying headache, visual changes, ankle swelling, shortness of breath, chest pain, or dizziness. He is taking amlodipine 5 mg daily but has not monitored his blood pressure at home.  Hyperlipidemia: Managed with Crestor 20 mg daily, without complaints of muscle pain.  Gastroesophageal Reflux Disease (GERD): In remission, managed with Protonix 40 mg as needed, without symptoms such as heartburn, epigastric pain, sore throat, or dysphagia.  Mental Health: Chronic anxiety and depression, currently treated with Zoloft 100 mg daily and risperidone. He denies any current symptoms of anxiety or depression and  has no suicidal ideation or plans. He has been seeing a psychiatrist and has had no insomnia since taking hydroxyzine.  Seizure History: History of generalized seizures post-AVM brain surgery. No recurrence of seizures for over 20 years.  Medications: The patient is on polypharmacy, taking over 10 medications. These were reviewed, and a detailed counseling session was conducted regarding the side effects, benefits, and indications of his medications.  Vaccinations: The patient is up-to-date on his shingles, tetanus, flu, and COVID-19 vaccines but has not received the pneumonia vaccine. He has agreed to receive the pneumonia vaccine today.  Past Medical History:  AVM: Status post-brain surgery for arteriovenous malformation.  Osteoarthritis: Severe bilateral knee osteoarthritis, status post left knee replacement.  Lumbar Spine Stenosis: X-ray in 2022 shows spinal stenosis from L3 through S1.  Colon Polyps: Colonoscopy performed 2 years ago, revealing polyps but no malignancy.  Review of Systems:  Constitutional: Denies fever, chills, weight loss, or night sweats.  Cardiovascular: No chest pain, shortness of breath, or palpitations.  Gastrointestinal: No heartburn, epigastric pain, dysphagia, or nausea.  Neurological: No recent seizures or neurological deficits.  Musculoskeletal: Chronic back pain and right knee pain with swelling, as mentioned above.  Psychiatric: No depression, anxiety, or suicidal ideation currently. Well-managed with current medications.  Social History:  The patient does not smoke or use illicit drugs. He does not consume alcohol.  He lives at home with his wife and is independent in activities of daily living (ADLs), except for driving.  He is retired and has children.  Family History:  Not discussed in detail during this visit, but the patient's current presentation does not suggest any significant family history that warrants immediate concern.  Suggestive General Assessment & Plan:  Back  Pain: Likely related to chronic lumbar spine stenosis and osteoarthritis. Conservative management with physical therapy, pain control, and possibly referral to orthopedic or pain management specialists.  Right Knee Pain & Swelling: Chronic osteoarthritis with exacerbation. Conservative treatment, including NSAIDs, knee brace, and possibly a referral for further evaluation of his knee symptoms.  Hypertension: Blood pressure remains poorly controlled. Increase monitoring at home, consider adjusting antihypertensive therapy if necessary.  Hyperlipidemia: Continue Crestor as prescribed.  GERD: Continue Protonix as needed.  Mental Health: Continue Zoloft and risperidone for depression and anxiety management. Consider follow-up with a psychiatrist as needed.  Seizures: Continue monitoring; no recent seizures for 20 years.  Vaccination: Administer pneumonia vaccine today.  Medication Review: Continue reviewing and counseling on the patient's polypharmacy. Monitor for side effects and adjust medications as necessary.  Follow-up: Schedule a follow-up appointment in 2 weeks for reassessment and further management.      BACK PAIN:  He reports intermittent back pain that sometimes affects the side more than the back, with pain severity reaching 7.5-8/10. He takes Tylenol for pain management. X-ray from June 2021 showed lumbar spine narrowing and osteoarthritis.    LOWER EXTREMITY:  He reports unilateral leg swelling affecting the knee, legs, ankle, and foot. He underwent knee replacement 1.5 years ago.    MEDICAL HISTORY:  He has a history of seizures from over 20 years ago. He was diagnosed with bilateral cataracts one month ago, pending surgical intervention. Colonoscopy in June last year revealed polyps without evidence of cancer. He has a history of brain surgery for arteriovenous malformation (AVM).    MEDICATIONS:  Current medications include Zoloft and Risperdal for depression, pantoprazole as needed for reflux,  amlodipine for blood pressure, hydroxyzine for allergies and sleep, and folic acid-B12 combination for anemia.    FAMILY HISTORY:  His oldest brother has hypertension.    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 His granddaughter visits 2-3 times weekly. He does not drive. He reports drinking three glasses of wine daily, which began 3 years ago. He denies smoking or marijuana use.         Active Problem List with Overview Notes    Diagnosis Date Noted    Chronic left-sided low back pain without sciatica 07/09/2021    Decreased range of motion of lumbar spine 07/09/2021    Hyperlipidemia 07/17/2018    Paresthesias 06/29/2017    Constipation, chronic 08/30/2016    Essential hypertension 05/13/2016    Major depressive disorder, recurrent episode, severe, specified as with psychotic behavior 09/30/2014     Dx updated per 2019 IMO Load      Generalized convulsive epilepsy without intractable epilepsy 11/13/2013    AVM (arteriovenous malformation) 11/13/2013     left temporal craniotomy and partial left temporal lobe resection          MEDICATIONS:    Current Outpatient Medications:     amLODIPine (NORVASC) 5 MG tablet, Take 1 tablet (5 mg total) by mouth once daily., Disp: 90 tablet, Rfl: 3    folic acid-vit B6-vit B12 2.5-25-2 mg (FOLBIC) 2.5-25-2 mg Tab, Take 1 tablet by mouth once daily., Disp: 90 tablet, Rfl: 1    hydrOXYzine pamoate (VISTARIL) 25 MG Cap, TAKE 1 OR 2 CAPSULES AT BEDTIME AS NEEDED FOR SLEEP, Disp: 60 capsule, Rfl: 0    lamoTRIgine (LAMICTAL) 200 MG tablet, TAKE 1 TABLET ONE TIME DAILY, Disp: 90 tablet, Rfl: 1    pantoprazole (PROTONIX) 40 MG tablet, take 1 tablet by mouth every morning BEFORE BREAKFAST, Disp: 90 tablet, Rfl: 3    risperiDONE (RISPERDAL) 1 MG tablet, Take 1 tablet (1 mg total) by mouth every evening., Disp: 90 tablet, Rfl: 1    rosuvastatin (CRESTOR) 20 MG tablet, Take 1 tablet (20 mg total) by mouth once daily., Disp: 90 tablet, Rfl: 3    sertraline  (ZOLOFT) 100 MG tablet, Take 1 tablet (100 mg total) by mouth once daily., Disp: 90 tablet, Rfl: 1    sars-cov-2, covid-19, (MODERNA COVID-19) 50 mcg/0.25 ml injection (BOOSTER), Inject into the muscle., Disp: 0.25 mL, Rfl: 0    sars-cov-2, covid-19, (MODERNA COVID-19) 50 mcg/0.25 ml injection (BOOSTER), Inject into the muscle., Disp: 0.25 mL, Rfl: 0    Current Facility-Administered Medications:     pneumoc 20-gema conj-dip cr(PF) (PREVNAR-20 (PF)) injection Syrg 0.5 mL, 0.5 mL, Intramuscular, 1 time in Clinic/HOD,     ALLERGIES:  Review of patient's allergies indicates:  No Known Allergies     Past Medical History:   Diagnosis Date    Allergy     Anxiety     Depression     Headache(784.0)     History of psychiatric hospitalization     Hx of psychiatric care     Hypertension     Psychiatric problem     Seizures     last seizure 2006    Therapy      Past Surgical History:   Procedure Laterality Date    BRAIN SURGERY  1990    AV malformation repair    COLONOSCOPY N/A 10/3/2016    Procedure: COLONOSCOPY;  Surgeon: Ehsan Odom MD;  [facility]  Service: Endoscopy;  Laterality: N/A;  ok for me or wes to do colonoscopy per patient would like asap    COLONOSCOPY N/A 6/25/2024    Procedure: COLONOSCOPY;  Surgeon: Ehsan Odom MD;  [facility]  Service: Endoscopy;  Laterality: N/A;  prep instructions mailed -[facility]  referral PCp Dani  3/18-precall complete-KPvt  pt previosly scheduled with Dr. Odom on 3/23/24; requesting Dr. Odom only; previous colonoscopy done by Dr. Odom-  3/22 r.,s PEG, updated instructions emailed to jacqueline     Social History     Social History Narrative    Not on file     Family History   Problem Relation Name Age of Onset    Heart attack Brother  62    Cancer Brother      Cancer Mother      Thyroid disease Daughter      Heart disease Daughter      No Known Problems Son      Cancer Brother      Alcohol abuse Maternal Uncle       "Depression Maternal Uncle      Celiac disease Neg Hx      Colon cancer Neg Hx      Esophageal cancer Neg Hx         Vitals:    02/14/25 1321   BP: (!) 156/87   Pulse: 76   SpO2: 97%   Weight: 92.7 kg (204 lb 5.9 oz)   Height: 5' 9" (1.753 m)   PainSc:   7   PainLoc: Abdomen       Review of Systems   Constitutional:  Negative for chills, fatigue, fever and unexpected weight change.   HENT:  Negative for nasal congestion, ear discharge, ear pain, hearing loss, sore throat and voice change.    Eyes:  Negative for photophobia, pain and visual disturbance.   Respiratory:  Negative for apnea, cough, chest tightness, shortness of breath and wheezing.    Cardiovascular:  Negative for chest pain and leg swelling.   Gastrointestinal:  Negative for abdominal pain, nausea, vomiting and reflux.   Genitourinary:  Negative for difficulty urinating, erectile dysfunction and urgency.   Musculoskeletal:  Positive for back pain, joint swelling and myalgias.   Integumentary:  Negative for rash.   Neurological:  Negative for dizziness, vertigo, tremors, speech difficulty and headaches.   Hematological:  Does not bruise/bleed easily.   Psychiatric/Behavioral:  Negative for depressed mood, sleep disturbance and suicidal ideas. The patient is not nervous/anxious.         Lab Results   Component Value Date    HGBA1C 5.4 07/25/2023    HGBA1C 5.9 04/21/2016     No results found for: "MICALBCREAT"  Lab Results   Component Value Date    LDLCALC 73.8 07/25/2023    LDLCALC 103.2 09/12/2022    CHOL 163 07/25/2023    HDL 77 (H) 07/25/2023    TRIG 61 07/25/2023       Lab Results   Component Value Date     08/30/2023    K 3.7 08/30/2023     07/25/2023    CO2 29 08/30/2023    GLU 81 07/25/2023    BUN 8.8 (L) 08/30/2023    CREATININE 1.27 08/30/2023    CALCIUM 9.1 08/30/2023    PROT 7.2 07/25/2023    ALBUMIN 4.7 08/30/2023    BILITOT 0.5 08/30/2023    ALKPHOS 94 07/25/2023    AST 36 (H) 08/30/2023    ALT 28 08/30/2023    ANIONGAP 7 (L) " 08/30/2023    ESTGFRAFRICA >60.0 06/04/2021    EGFRNONAA >60.0 06/04/2021    WBC 5.15 07/25/2023    HGB 12.7 (L) 07/25/2023    HGB 14.5 09/12/2022    HCT 40.7 07/25/2023    MCV 94 07/25/2023     07/25/2023    TSH 0.975 06/04/2021    PSA 1.2 07/25/2023    PSA 1.0 07/18/2020    HEPCAB Negative 08/30/2016       Lab Results   Component Value Date    ZGTJBRES42RU 39 03/02/2017    WUIDRTPM53 1458 (H) 07/18/2020    FERRITIN 87 07/18/2020    IRON 61 07/18/2020    TRANSFERRIN 186 (L) 07/18/2020    TIBC 275 07/18/2020    FESATURATED 22 07/18/2020       Objective:   Physical Exam   Physical Exam    General: No acute distress. Well-developed. Well-nourished.+ obesity  Eyes: EOMI. Sclerae anicteric.  HENT: Normocephalic. Atraumatic. Nares patent. Moist oral mucosa. Tenderness on swallowing.  Ears: Bilateral TMs clear. Bilateral EACs clear.  Cardiovascular: Regular rate. Regular rhythm. No murmurs. No rubs. No gallops. Normal S1, S2.  Respiratory: Normal respiratory effort. Clear to auscultation bilaterally. No rales. No rhonchi. No wheezing.  Abdomen: Soft. Non-tender. Non-distended. Normoactive bowel sounds.  Musculoskeletal: No  obvious deformity. Muscles strong.  Extremities: No lower extremity edema.+ right knee swelling with tenderness but no  erythema and warm  Neurological: Alert & oriented x3. No slurred speech. Normal gait.  Psychiatric: Normal mood. Normal affect. Good insight. Good judgment.  Skin: Warm. Dry. No rash.         Assessment:     1. Essential hypertension    2. Mixed hyperlipidemia    3. Uncontrolled stage 2 hypertension    4. Hyperlipidemia, mixed    5. Obesity (BMI 30.0-34.9)    6. AVM (arteriovenous malformation)    7. Major depressive disorder, recurrent episode, severe, specified as with psychotic behavior    8. Generalized convulsive epilepsy without intractable epilepsy    9. Encounter for routine adult health examination with abnormal findings    10. Chronic pain of right knee    11.  Bilateral stenosis of lateral recess of lumbar spine    12. Chronic radicular pain of lower back    13. Normocytic anemia    14. Stage 2 chronic kidney disease    15. Encounter for routine adult health examination without abnormal findings    16. Abnormal finding of blood chemistry, unspecified    17. Other dietary vitamin B12 deficiency anemia    18. Abnormal lead level in blood    19. Other vitamin B12 deficiency anemias    20. Abnormal blood-gas level    21. Age-related cataract of both eyes, unspecified age-related cataract type      Plan:   Assessment & Plan   Essential hypertension  -     CBC Auto Differential; Future; Expected date: 02/14/2025  -     Comprehensive Metabolic Panel; Future; Expected date: 02/14/2025  -     Hemoglobin A1C; Future; Expected date: 02/14/2025    Mixed hyperlipidemia    Uncontrolled stage 2 hypertension    Hyperlipidemia, mixed  -     Lipid Panel; Future; Expected date: 02/14/2025    Obesity (BMI 30.0-34.9)    AVM (arteriovenous malformation)    Major depressive disorder, recurrent episode, severe, specified as with psychotic behavior    Generalized convulsive epilepsy without intractable epilepsy    Encounter for routine adult health examination with abnormal findings    Chronic pain of right knee  -     C-Reactive Protein; Future; Expected date: 02/14/2025    Bilateral stenosis of lateral recess of lumbar spine  -     X-Ray Knee 3 View Bilateral; Future; Expected date: 02/14/2025    Chronic radicular pain of lower back  -     C-Reactive Protein; Future; Expected date: 02/14/2025  -     X-Ray Lumbar Spine Ap Lateral w/Flex Ext; Future; Expected date: 02/14/2025    Normocytic anemia  -     Ferritin; Future; Expected date: 02/14/2025  -     Folate; Future; Expected date: 02/14/2025  -     Iron and TIBC; Future; Expected date: 02/14/2025  -     Vitamin B12; Future; Expected date: 02/14/2025    Stage 2 chronic kidney disease  -     Comprehensive Metabolic Panel; Future; Expected date:  02/14/2025    Encounter for routine adult health examination without abnormal findings  -     CBC Auto Differential; Future; Expected date: 02/14/2025  -     Comprehensive Metabolic Panel; Future; Expected date: 02/14/2025  -     Hemoglobin A1C; Future; Expected date: 02/14/2025  -     pneumoc 20-gema conj-dip cr(PF) (PREVNAR-20 (PF)) injection Syrg 0.5 mL  -     Hemoglobin A1C; Future; Expected date: 02/14/2025  -     X-Ray Knee 3 View Bilateral; Future; Expected date: 02/14/2025  -     X-Ray Lumbar Spine Ap Lateral w/Flex Ext; Future; Expected date: 02/14/2025    Abnormal finding of blood chemistry, unspecified  -     Hemoglobin A1C; Future; Expected date: 02/14/2025    Other dietary vitamin B12 deficiency anemia  -     Folate; Future; Expected date: 02/14/2025    Abnormal lead level in blood  -     Hemoglobin A1C; Future; Expected date: 02/14/2025    Other vitamin B12 deficiency anemias  -     Vitamin B12; Future; Expected date: 02/14/2025    Abnormal blood-gas level    Age-related cataract of both eyes, unspecified age-related cataract type       Assessment & Plan    IMPRESSION:  - Evaluated back pain and leg swelling, noting history of osteoarthritis and knee replacement  - Reviewed extensive medication list, including psychiatric medications  - Considered history of arteriovenous malformation (AVM) and brain surgery  - Noted elevated blood pressure during visit, potentially due to white coat syndrome  - Assessed kidney function decline and mild anemia from previous lab results  - Reviewed recent colonoscopy results showing benign polyps  - Considered updating vaccinations, particularly pneumonia vaccine    BACK PAIN:  - Patient reports intermittent back pain, occasionally more severe on the side than the back, with pain severity ranging up to 7.5 or 8 out of 10.  - Reviewed previous x-ray results from June 2021, which showed lumbar spine narrowing, indicating mild osteoarthritis.  - Ordered updated x-rays and  labs to assess inflammation levels and determine appropriate pain relief medication.  - Ordered updated x-rays to assess the current condition of the lumbar spine and legs.  - Will determine appropriate treatment plan after reviewing test results.    LEG PAIN AND EDEMA:  - Patient reports leg pain distinct from back pain, with edema in legs, knees, ankles, and feet.  - Observed swelling in the patient's legs during exam.  - Ordered updated x-rays to assess the current condition of the lumbar spine and legs.    KNEE REPLACEMENT:  - Patient underwent knee replacement surgery 18 months ago.  - Examined the surgical site and noted swelling.  - Ordered updated x-rays to assess the current condition of the knee replacement.  - Ordered x-ray of swollen knee.    OSTEOARTHRITIS:  - Patient has osteoarthritis in both knees, with one knee replaced and the other exhibiting swelling.  - Observed swelling in the non-replaced knee during exam.  - Will determine appropriate treatment plan after reviewing x-ray results.    DEPRESSION:  - Continued Zoloft and Risperdal for depression management.  - Patient reports seeing a psychiatrist 2 months ago.    GASTROESOPHAGEAL REFLUX DISEASE (GERD):  - Continued pantoprazole (Protonix) for reflux disease, to be taken as needed.    HYPERTENSION:  - Instructed the patient to start monitoring blood pressure at home regularly.  - Continued amlodipine for hypertension management.  - Noted elevated blood pressure during the visit, potentially due to anxiety.    HYPERLIPIDEMIA:  - Continued cholesterol medication.  - Noted that cholesterol levels were previously elevated but normalized based on tests from 3 years ago.  - Ordered new labs to reassess cholesterol levels.    SEIZURE DISORDER:  - Continued Lamictal for seizure prevention.  - Patient reports having a single seizure over 20 years ago, with no recurrence since then.    ARTERIOVENOUS MALFORMATION (AVM):  - Explained arteriovenous  malformation (AVM) as a developmental blood vessel abnormality with potential for hemorrhage.  - Noted patient's history of AVM and brain surgery.    CATARACTS:  - Patient was diagnosed with bilateral cataracts approximately 1 month ago.    ALCOHOL USE:  - Patient reports consuming approximately 3 glasses of wine occasionally over the past 3 years.    COLON POLYPS:  - Patient underwent colonoscopy in June of last year, which revealed polyps but no malignancy.  - Recommend follow-up colonoscopy in 5 years.    ANEMIA:  - Continued folic acid B12 combination vitamins for anemia.    MEDICATIONS/SUPPLEMENTS:  - Discontinued Meloxicam for pain management.  - Discontinued Zyrtec for allergies.  - Continued hydroxyzine (Vistaril) for sleep.    LABS:    - Blood tests ordered to check sugars, cholesterol, kidney function, liver function, and inflammation markers.  - EKG ordered to update heart condition.    FOLLOW UP:  - Follow up in 2 weeks to review test results and discuss treatment plan.         Orders Placed This Encounter    X-Ray Knee 3 View Bilateral    X-Ray Lumbar Spine Ap Lateral w/Flex Ext    CBC Auto Differential    Comprehensive Metabolic Panel    Hemoglobin A1C    C-Reactive Protein    Ferritin    Folate    Hemoglobin A1C    Iron and TIBC    Lipid Panel    Vitamin B12    pneumoc 20-gema conj-dip cr(PF) (PREVNAR-20 (PF)) injection Syrg 0.5 mL       Follow up in about 2 weeks (around 2/28/2025).     There are no Patient Instructions on file for this visit.  [unfilled]   Tests to Keep You Healthy    Colon Cancer Screening: Met on 6/25/2024  Last Blood Pressure <= 139/89 (2/14/2025): NO      Visit Checklist (as applicable):  1. Status of new and prior symptoms discussed? yes  2. Imaging reviewed/ ordered as appropriate? yes  3. Lab study reviewed/ ordered as appropriate? yes  4. Plan for work-up and treatment discussed with patient? yes  5. Potential medication side-effects and monitoring plan discussed? yes  6.  Review of outside medical records was performed and pertinent details are summarized in the HPI above? yes     Time spent on this encounter: 65minutes. This includes face to face time and non-face to face time preparing to see the patient (eg, review of tests), obtaining and/or reviewing separately obtained history, documenting clinical information in the electronic or other health record, independently interpreting results (not separately reported) and communicating results to the patient/family/caregiver, or care coordination (not separately reported). Also patient education regarding chronic and acute medical conditions reviewed. Patient handouts given if appropriate.     Each patient to whom he or she provides medical services by telemedicine is:  (1) informed of the relationship between the physician and patient and the respective role of any other health care provider with respect to management of the patient; and (2) notified that he or she may decline to receive medical services by telemedicine and may withdraw from such care at any time.     This note was generated with the assistance of ambient listening technology. Verbal consent was obtained by the patient and accompanying visitor(s) for the recording of patient appointment to facilitate this note. I attest to having reviewed and edited the generated note for accuracy, though some syntax or spelling errors may persist. Please contact the author of this note for any clarification.       Farhat Weber MD  [facility]

## 2025-02-21 DIAGNOSIS — Z00.00 ENCOUNTER FOR MEDICARE ANNUAL WELLNESS EXAM: ICD-10-CM

## 2025-03-05 ENCOUNTER — OFFICE VISIT (OUTPATIENT)
Dept: PSYCHIATRY | Facility: CLINIC | Age: 65
End: 2025-03-05
Payer: MEDICARE

## 2025-03-05 VITALS
DIASTOLIC BLOOD PRESSURE: 96 MMHG | SYSTOLIC BLOOD PRESSURE: 150 MMHG | BODY MASS INDEX: 29.22 KG/M2 | WEIGHT: 197.88 LBS | HEART RATE: 73 BPM

## 2025-03-05 DIAGNOSIS — F39 MOOD DISORDER: ICD-10-CM

## 2025-03-05 PROCEDURE — 99999 PR PBB SHADOW E&M-EST. PATIENT-LVL I: CPT | Mod: PBBFAC,HCNC,, | Performed by: PSYCHIATRY & NEUROLOGY

## 2025-03-05 PROCEDURE — 3008F BODY MASS INDEX DOCD: CPT | Mod: HCNC,CPTII,S$GLB, | Performed by: PSYCHIATRY & NEUROLOGY

## 2025-03-05 PROCEDURE — 99214 OFFICE O/P EST MOD 30 MIN: CPT | Mod: HCNC,S$GLB,, | Performed by: PSYCHIATRY & NEUROLOGY

## 2025-03-05 PROCEDURE — 3077F SYST BP >= 140 MM HG: CPT | Mod: HCNC,CPTII,S$GLB, | Performed by: PSYCHIATRY & NEUROLOGY

## 2025-03-05 PROCEDURE — 3080F DIAST BP >= 90 MM HG: CPT | Mod: HCNC,CPTII,S$GLB, | Performed by: PSYCHIATRY & NEUROLOGY

## 2025-03-05 RX ORDER — HYDROXYZINE PAMOATE 25 MG/1
CAPSULE ORAL
Qty: 60 CAPSULE | Refills: 0 | Status: SHIPPED | OUTPATIENT
Start: 2025-03-05

## 2025-03-05 RX ORDER — FOLIC ACID-PYRIDOXINE-CYANOCOBALAMIN TAB 2.5-25-2 MG 2.5-25-2 MG
1 TAB ORAL DAILY
Qty: 90 TABLET | Refills: 1 | Status: SHIPPED | OUTPATIENT
Start: 2025-03-05

## 2025-03-05 RX ORDER — RISPERIDONE 1 MG/1
1 TABLET ORAL NIGHTLY
Qty: 90 TABLET | Refills: 1 | Status: SHIPPED | OUTPATIENT
Start: 2025-03-05

## 2025-03-05 RX ORDER — SERTRALINE HYDROCHLORIDE 100 MG/1
100 TABLET, FILM COATED ORAL DAILY
Qty: 90 TABLET | Refills: 1 | Status: SHIPPED | OUTPATIENT
Start: 2025-03-05

## 2025-03-05 RX ORDER — LAMOTRIGINE 200 MG/1
TABLET ORAL
Qty: 90 TABLET | Refills: 1 | Status: SHIPPED | OUTPATIENT
Start: 2025-03-05

## 2025-03-05 NOTE — PROGRESS NOTES
ESTABLISHED OUTPATIENT VISIT   E/M LEVEL 4: [id number]    ENCOUNTER DATE: 3/5/2025  SITE: [facility], [address]    HISTORY    CHIEF COMPLAINT   Roni Espinoza is a 64 y.o. male who presents for follow up of Cognitive d/o N.O.S., Mood d/o N.O.S., Psychosis N.O.S.  .    HPI     Denies recent seizure.    Paranoid feelings at times.    He again appears psychiatrically stable during today's visit. Shows a sense of humor.    On exam today no EPS noted.    Psychotropic meds were discussed.    Psychiatric Review Of Systems - Is patient experiencing or having changes in:  sleep: good, takes Hydroxyzine prn  appetite: no  Weight: no  energy/anergy: no  interest/pleasure/anhedonia: no  somatic symptoms: no  libido: no  anxiety/panic: no  guilty/hopelessness: no  concentration: no  S.I.B.s/risky behavior: no  Irritability: no  Racing thoughts: no  Impulsive behaviors: no  Paranoia:no  AVH:no    Recent alcohol: occasional  Recent drug: no    Medical ROS   Denies any current physical complaint.     PAST MEDICAL, FAMILY AND SOCIAL HISTORY: The patient's past medical, family and social history have been reviewed and updated as appropriate within the electronic medical record - see encounter notes.    PSYCHOTROPIC MEDICATIONS   Lamictal 200 mg qam, Zoloft 100 mg qam, Risperdal 1 mg at bedtime, Hydroxyzine 25-50 mg at bedtime prn[has been taking about 3 times per week], Folbic    EXAMINATION    Vitals:    03/05/25 1021   BP: (!) 150/96   Pulse: 73       BP (!) 150/96   Pulse 73   Wt 89.8 kg (197 lb 13.8 oz)   BMI 29.22 kg/m²     CONSTITUTIONAL  General Appearance: well nourished    MUSCULOSKELETAL  Muscle Strength and Tone: normal strength and tone  Abnormal Involuntary Movements: no abnormal movement noted  Gait and Station: normal gait    PSYCHIATRIC   Level of Consciousness: alert  Orientation: knows correct day of the week and day of the month  Grooming: well groomed  Psychomotor Behavior: no  restlessness/agitation  Speech: normal in rate, rhythm and volume  Language: normal vocabulary  Mood: steady  Affect: appropriate  Thought Process: logical  Associations: intact associations  Thought Content: no SI/HI  Memory: grossly intact  Attention: intact to content of interview  Fund of Knowledge: appears adequate  Insight: good  Judgement: good    MEDICAL DECISION MAKING    DIAGNOSES  Cognitive d/o N.O.S., Depressive d/o unspecified, Psychosis N.O.S.    PROBLEM LIST AND MANAGEMENT PLANS    - depressive d/o, psychosis: continue above meds  - rtc 3 months     Time with patient: 20 min    LABORATORY DATA  No visits with results within 3 Month(s) from this visit.   Latest known visit with results is:   Admission on 06/25/2024, Discharged on 06/25/2024   Component Date Value Ref Range Status    Final Pathologic Diagnosis 06/25/2024    Final                    Value:Ascending colon polyp:   Tubular adenoma      Interp By Arlette Maldonado M.D., Signed on 06/27/2024 at 12:47    Gross 06/25/2024    Final                    Value:Patient ID/MRN:  [id number]   Pathology label MRN:  [id number]    The specimen is received in formalin labeled with the patient's name, medical record number and designated as &quot;ascending colon polyp&quot;. The specimen consists of multiple tan-white to tan-yellow fragments of soft tissue measuring 0.9 x 0.6 x 0.2   cm in aggregate. The specimen is submitted entirely in cassette [id number].    Ericka Angeles  Grossing Technologist      Disclaimer 06/25/2024 Unless the case is a 'gross only' or additional testing only, the final diagnosis for each specimen is based on a microscopic examination of appropriate tissue sections.   Final           Vinnie Cotter

## 2025-03-31 ENCOUNTER — PATIENT MESSAGE (OUTPATIENT)
Dept: ADMINISTRATIVE | Facility: HOSPITAL | Age: 65
End: 2025-03-31
Payer: MEDICARE

## 2025-05-16 RX ORDER — HYDROXYZINE PAMOATE 25 MG/1
CAPSULE ORAL
Qty: 60 CAPSULE | Refills: 0 | Status: SHIPPED | OUTPATIENT
Start: 2025-05-16

## 2025-05-27 ENCOUNTER — PATIENT MESSAGE (OUTPATIENT)
Dept: INTERNAL MEDICINE | Facility: CLINIC | Age: 65
End: 2025-05-27
Payer: MEDICARE

## 2025-05-27 DIAGNOSIS — I10 ESSENTIAL HYPERTENSION: ICD-10-CM

## 2025-05-28 DIAGNOSIS — I10 ESSENTIAL HYPERTENSION: Primary | ICD-10-CM

## 2025-05-28 RX ORDER — AMLODIPINE BESYLATE 10 MG/1
10 TABLET ORAL DAILY
Qty: 90 TABLET | Refills: 3 | Status: SHIPPED | OUTPATIENT
Start: 2025-05-28 | End: 2026-05-28

## 2025-05-28 NOTE — PROGRESS NOTES
Refill amlodipine 10 mg daily for hypertension control, 90 pills, 3 refill and sent to [facility] pharmacy today

## 2025-05-29 RX ORDER — AMLODIPINE BESYLATE 5 MG/1
5 TABLET ORAL DAILY
Qty: 90 TABLET | Refills: 3 | Status: SHIPPED | OUTPATIENT
Start: 2025-05-29

## 2025-06-06 ENCOUNTER — HOSPITAL ENCOUNTER (OUTPATIENT)
Dept: RADIOLOGY | Facility: OTHER | Age: 65
Discharge: HOME | End: 2025-06-06
Attending: INTERNAL MEDICINE
Payer: MEDICARE

## 2025-06-06 ENCOUNTER — RESULTS FOLLOW-UP (OUTPATIENT)
Dept: INTERNAL MEDICINE | Facility: CLINIC | Age: 65
End: 2025-06-06
Payer: MEDICARE

## 2025-06-06 DIAGNOSIS — Z00.00 ENCOUNTER FOR ROUTINE ADULT HEALTH EXAMINATION WITHOUT ABNORMAL FINDINGS: ICD-10-CM

## 2025-06-06 DIAGNOSIS — M48.061 BILATERAL STENOSIS OF LATERAL RECESS OF LUMBAR SPINE: ICD-10-CM

## 2025-06-06 DIAGNOSIS — M54.16 CHRONIC RADICULAR PAIN OF LOWER BACK: ICD-10-CM

## 2025-06-06 DIAGNOSIS — G89.29 CHRONIC RADICULAR PAIN OF LOWER BACK: ICD-10-CM

## 2025-06-06 PROCEDURE — 73562 X-RAY EXAM OF KNEE 3: CPT | Mod: 26,50,HCNC, | Performed by: RADIOLOGY

## 2025-06-06 PROCEDURE — 73562 X-RAY EXAM OF KNEE 3: CPT | Mod: TC,50,HCNC,FY

## 2025-06-06 PROCEDURE — 72110 X-RAY EXAM L-2 SPINE 4/>VWS: CPT | Mod: TC,HCNC,FY

## 2025-06-06 PROCEDURE — 72110 X-RAY EXAM L-2 SPINE 4/>VWS: CPT | Mod: 26,HCNC,, | Performed by: RADIOLOGY

## 2025-06-09 ENCOUNTER — PATIENT MESSAGE (OUTPATIENT)
Dept: ADMINISTRATIVE | Facility: HOSPITAL | Age: 65
End: 2025-06-09
Payer: MEDICARE

## 2025-06-09 DIAGNOSIS — M17.11 OSTEOARTHRITIS OF RIGHT KNEE, UNSPECIFIED OSTEOARTHRITIS TYPE: Primary | ICD-10-CM

## 2025-06-09 NOTE — PROGRESS NOTES
X-ray study Right knee: Severe tricompartmental osteoarthritis. Large joint effusion with 10 mm loose body within the suprapatellar recess and possible additional loose bodies in the popliteal fossa.  I will refer vision to orthopedic consultation for further treatment evaluation today.

## 2025-06-10 ENCOUNTER — OFFICE VISIT (OUTPATIENT)
Dept: PSYCHIATRY | Facility: CLINIC | Age: 65
End: 2025-06-10
Payer: MEDICARE

## 2025-06-10 VITALS
WEIGHT: 198.94 LBS | SYSTOLIC BLOOD PRESSURE: 107 MMHG | DIASTOLIC BLOOD PRESSURE: 71 MMHG | HEART RATE: 87 BPM | BODY MASS INDEX: 29.38 KG/M2

## 2025-06-10 DIAGNOSIS — F39 MOOD DISORDER: ICD-10-CM

## 2025-06-10 PROCEDURE — 99214 OFFICE O/P EST MOD 30 MIN: CPT | Mod: HCNC,S$GLB,, | Performed by: PSYCHIATRY & NEUROLOGY

## 2025-06-10 PROCEDURE — 3074F SYST BP LT 130 MM HG: CPT | Mod: CPTII,HCNC,S$GLB, | Performed by: PSYCHIATRY & NEUROLOGY

## 2025-06-10 PROCEDURE — 99999 PR PBB SHADOW E&M-EST. PATIENT-LVL I: CPT | Mod: PBBFAC,HCNC,, | Performed by: PSYCHIATRY & NEUROLOGY

## 2025-06-10 PROCEDURE — 3078F DIAST BP <80 MM HG: CPT | Mod: CPTII,HCNC,S$GLB, | Performed by: PSYCHIATRY & NEUROLOGY

## 2025-06-10 PROCEDURE — 3008F BODY MASS INDEX DOCD: CPT | Mod: CPTII,HCNC,S$GLB, | Performed by: PSYCHIATRY & NEUROLOGY

## 2025-06-10 RX ORDER — RISPERIDONE 1 MG/1
1 TABLET ORAL NIGHTLY
Qty: 90 TABLET | Refills: 1 | Status: SHIPPED | OUTPATIENT
Start: 2025-06-10

## 2025-06-10 RX ORDER — LAMOTRIGINE 200 MG/1
TABLET ORAL
Qty: 90 TABLET | Refills: 1 | Status: SHIPPED | OUTPATIENT
Start: 2025-06-10

## 2025-06-10 RX ORDER — FOLIC ACID-PYRIDOXINE-CYANOCOBALAMIN TAB 2.5-25-2 MG 2.5-25-2 MG
1 TAB ORAL DAILY
Qty: 90 TABLET | Refills: 1 | Status: SHIPPED | OUTPATIENT
Start: 2025-06-10

## 2025-06-10 RX ORDER — SERTRALINE HYDROCHLORIDE 100 MG/1
100 TABLET, FILM COATED ORAL DAILY
Qty: 90 TABLET | Refills: 1 | Status: SHIPPED | OUTPATIENT
Start: 2025-06-10

## 2025-06-10 NOTE — PROGRESS NOTES
ESTABLISHED OUTPATIENT VISIT   E/M LEVEL 4: [id number]    ENCOUNTER DATE: 6/10/2025  SITE: [facility], [address]    HISTORY    CHIEF COMPLAINT   Roni Espinoza is a 64 y.o. male who presents for follow up of Cognitive d/o N.O.S., Mood d/o N.O.S., Psychosis N.O.S.  .    HPI     Denies recent seizure.    Paranoid feelings at times.    He again appears psychiatrically stable during today's visit. Shows a sense of humor.    On exam today no EPS noted.    Psychotropic meds were discussed.    Psychiatric Review Of Systems - Is patient experiencing or having changes in:  sleep: good, takes Hydroxyzine prn  appetite: no  Weight: no  energy/anergy: no  interest/pleasure/anhedonia: no  somatic symptoms: no  libido: no  anxiety/panic: no  guilty/hopelessness: no  concentration: no  S.I.B.s/risky behavior: no  Irritability: no  Racing thoughts: no  Impulsive behaviors: no  Paranoia:no  AVH:no    Recent alcohol: about 2 glasses of wine in the evening twice a week  Recent drug: no    Medical ROS   Denies any current physical complaint.   :  PAST MEDICAL, FAMILY AND SOCIAL HISTORY: The patient's past medical, family and social history have been reviewed and updated as appropriate within the electronic medical record - see encounter notes.    PSYCHOTROPIC MEDICATIONS   Lamictal 200 mg qam, Zoloft 100 mg qam, Risperdal 1 mg at bedtime, Hydroxyzine 25-50 mg at bedtime prn[has been taking about 3 times per week], Folbic    EXAMINATION    Vitals:    06/10/25 1250   BP: 107/71   Pulse: 87       /71   Pulse 87   Wt 90.2 kg (198 lb 15.4 oz)   BMI 29.38 kg/m²     CONSTITUTIONAL  General Appearance: well nourished    MUSCULOSKELETAL  Muscle Strength and Tone: normal strength and tone  Abnormal Involuntary Movements: no abnormal movement noted  Gait and Station: normal gait    PSYCHIATRIC   Level of Consciousness: alert  Orientation: knows correct day of the week and day of the month  Grooming: well groomed  Psychomotor  Behavior: no restlessness/agitation  Speech: normal in rate, rhythm and volume  Language: normal vocabulary  Mood: steady  Affect: appropriate  Thought Process: logical  Associations: intact associations  Thought Content: no SI/HI  Memory: grossly intact  Attention: intact to content of interview  Fund of Knowledge: appears adequate  Insight: good  Judgement: good    MEDICAL DECISION MAKING    DIAGNOSES  Cognitive d/o N.O.S., Depressive d/o unspecified, Psychosis N.O.S.    PROBLEM LIST AND MANAGEMENT PLANS    - depressive d/o, psychosis: continue above meds  - rtc 3 months     Time with patient: 20 min    LABORATORY DATA  No visits with results within 3 Month(s) from this visit.   Latest known visit with results is:   Admission on 06/25/2024, Discharged on 06/25/2024   Component Date Value Ref Range Status    Final Pathologic Diagnosis 06/25/2024    Final                    Value:Ascending colon polyp:   Tubular adenoma      Interp By Arlette Maldonado M.D., Signed on 06/27/2024 at 12:47    Gross 06/25/2024    Final                    Value:Patient ID/MRN:  [id number]   Pathology label MRN:  [id number]    The specimen is received in formalin labeled with the patient's name, medical record number and designated as &quot;ascending colon polyp&quot;. The specimen consists of multiple tan-white to tan-yellow fragments of soft tissue measuring 0.9 x 0.6 x 0.2   cm in aggregate. The specimen is submitted entirely in cassette [id number].    Ericka Angeles  Grossing Technologist      Disclaimer 06/25/2024 Unless the case is a 'gross only' or additional testing only, the final diagnosis for each specimen is based on a microscopic examination of appropriate tissue sections.   Final           Vinnie Cotter

## 2025-06-12 DIAGNOSIS — M25.561 RIGHT KNEE PAIN, UNSPECIFIED CHRONICITY: Primary | ICD-10-CM

## 2025-08-16 ENCOUNTER — PATIENT MESSAGE (OUTPATIENT)
Dept: INTERNAL MEDICINE | Facility: CLINIC | Age: 65
End: 2025-08-16
Payer: MEDICARE